# Patient Record
Sex: FEMALE | Race: WHITE | NOT HISPANIC OR LATINO | Employment: FULL TIME | URBAN - METROPOLITAN AREA
[De-identification: names, ages, dates, MRNs, and addresses within clinical notes are randomized per-mention and may not be internally consistent; named-entity substitution may affect disease eponyms.]

---

## 2017-01-10 DIAGNOSIS — M75.22 BICIPITAL TENDINITIS OF LEFT SHOULDER: ICD-10-CM

## 2017-01-23 ENCOUNTER — GENERIC CONVERSION - ENCOUNTER (OUTPATIENT)
Dept: OTHER | Facility: OTHER | Age: 22
End: 2017-01-23

## 2017-01-23 ENCOUNTER — APPOINTMENT (OUTPATIENT)
Dept: PHYSICAL THERAPY | Facility: CLINIC | Age: 22
End: 2017-01-23
Payer: COMMERCIAL

## 2017-01-23 DIAGNOSIS — M75.22 BICIPITAL TENDINITIS OF LEFT SHOULDER: ICD-10-CM

## 2017-01-23 PROCEDURE — 97110 THERAPEUTIC EXERCISES: CPT

## 2017-01-23 PROCEDURE — 97112 NEUROMUSCULAR REEDUCATION: CPT

## 2017-01-27 ENCOUNTER — APPOINTMENT (OUTPATIENT)
Dept: PHYSICAL THERAPY | Facility: CLINIC | Age: 22
End: 2017-01-27
Payer: COMMERCIAL

## 2017-01-27 PROCEDURE — 97110 THERAPEUTIC EXERCISES: CPT

## 2017-01-30 ENCOUNTER — APPOINTMENT (OUTPATIENT)
Dept: PHYSICAL THERAPY | Facility: CLINIC | Age: 22
End: 2017-01-30
Payer: COMMERCIAL

## 2017-01-30 PROCEDURE — 97112 NEUROMUSCULAR REEDUCATION: CPT

## 2017-01-30 PROCEDURE — 97110 THERAPEUTIC EXERCISES: CPT

## 2017-02-02 ENCOUNTER — APPOINTMENT (OUTPATIENT)
Dept: PHYSICAL THERAPY | Facility: CLINIC | Age: 22
End: 2017-02-02
Payer: COMMERCIAL

## 2017-02-02 PROCEDURE — 97110 THERAPEUTIC EXERCISES: CPT

## 2017-02-02 PROCEDURE — 97112 NEUROMUSCULAR REEDUCATION: CPT

## 2017-02-06 ENCOUNTER — APPOINTMENT (OUTPATIENT)
Dept: PHYSICAL THERAPY | Facility: CLINIC | Age: 22
End: 2017-02-06
Payer: COMMERCIAL

## 2017-02-06 PROCEDURE — 97110 THERAPEUTIC EXERCISES: CPT

## 2017-02-09 ENCOUNTER — APPOINTMENT (OUTPATIENT)
Dept: PHYSICAL THERAPY | Facility: CLINIC | Age: 22
End: 2017-02-09
Payer: COMMERCIAL

## 2017-02-13 ENCOUNTER — APPOINTMENT (OUTPATIENT)
Dept: PHYSICAL THERAPY | Facility: CLINIC | Age: 22
End: 2017-02-13
Payer: COMMERCIAL

## 2017-02-13 PROCEDURE — 97112 NEUROMUSCULAR REEDUCATION: CPT

## 2017-02-13 PROCEDURE — 97110 THERAPEUTIC EXERCISES: CPT

## 2017-02-20 ENCOUNTER — APPOINTMENT (OUTPATIENT)
Dept: PHYSICAL THERAPY | Facility: CLINIC | Age: 22
End: 2017-02-20
Payer: COMMERCIAL

## 2017-02-20 PROCEDURE — 97112 NEUROMUSCULAR REEDUCATION: CPT

## 2017-02-20 PROCEDURE — 97110 THERAPEUTIC EXERCISES: CPT

## 2017-02-27 ENCOUNTER — APPOINTMENT (OUTPATIENT)
Dept: PHYSICAL THERAPY | Facility: CLINIC | Age: 22
End: 2017-02-27
Payer: COMMERCIAL

## 2018-01-11 NOTE — PROGRESS NOTES
Chief Complaint  pt here for ppd read  tc/cma      Active Problems    1  Acute maxillary sinusitis (461 0) (J01 00)   2  Annual physical exam (V70 0) (Z00 00)   3  Encounter for Papanicolaou cervical smear to confirm findings of recent normal smear   following initial abnormal smear (V72 32) (Z01 42)   4  Generalized anxiety disorder (300 02) (F41 1)   5  Injury of left shoulder, initial encounter (959 2) (S49 92XA)   6  Need for prophylactic vaccination and inoculation against influenza (V04 81) (Z23)   7  Pap smear, low-risk (V76 2) (Z01 419)   8  PPD screening test (V74 1) (Z11 1)   9  Right shoulder pain (719 41) (M25 511)   10  Screening for deficiency anemia (V78 1) (Z13 0)   11  Screening for diabetes mellitus (V77 1) (Z13 1)   12  Screening for lipoid disorders (V77 91) (Z13 220)   13  Screening for thyroid disorder (V77 0) (Z13 29)   14  Sprain of left shoulder, unspecified shoulder sprain type, initial encounter (840 9)    (S43 402A)    Current Meds   1  ALPRAZolam 0 25 MG Oral Tablet; TAKE ONE TABLET BY MOUTH EVERY 8 HOURS AS   NEEDED FOR ANXIETY; Therapy: 94ABH8105 to (Last Rx:30Mar2015) Ordered   2  Latuda 40 MG Oral Tablet; Therapy: 13ULA6513 to Recorded   3  Ortho Tri-Cyclen (28) 0 18/0 215/0 25 MG-35 MCG Oral Tablet; Therapy: 42QQD3436 to Recorded    Allergies    1   No Known Drug Allergies    Plan  PPD screening test    · PPD    Signatures   Electronically signed by : Mendel Devonshire, DO; Jul 25 2016  3:34AM EST                       (Author)

## 2018-01-12 ENCOUNTER — ALLSCRIPTS OFFICE VISIT (OUTPATIENT)
Dept: OTHER | Facility: OTHER | Age: 23
End: 2018-01-12

## 2018-01-12 LAB — S PYO AG THROAT QL: NEGATIVE

## 2018-01-16 NOTE — PROGRESS NOTES
Assessment    1  Screening for deficiency anemia (V78 1) (Z13 0)   2  Screening for thyroid disorder (V77 0) (Z13 29)   3  Screening for lipoid disorders (V77 91) (Z13 220)   4  Screening for diabetes mellitus (V77 1) (Z13 1)   5  Annual physical exam (V70 0) (Z00 00)   6  Pap smear, low-risk (V76 2) (Z01 419)   7  Never a smoker    Plan  Annual physical exam    · (1) COMPREHENSIVE METABOLIC PANEL; Status:Active; Requested for:12Yrj0201;   Pap smear, low-risk    · 3 - Vera Subramanian MD, [facility] Springer  (Obstetrics/Gynecology) Physician Referral  Consult  Status:  Active  Requested for: [id number]  are Referring to a non- Preferred Provider : Established Patient  Care Summary provided  : Yes  Screening for deficiency anemia    · (1) CBC/PLT/DIFF; Status:Active; Requested for:17Rnb0008;   Screening for diabetes mellitus    · (1) HEMOGLOBIN A1C; Status:Active; Requested for:22Nai5575;   Screening for lipoid disorders    · (1) LIPID PANEL, FASTING; Status:Active; Requested for:33Eiq4383;   Screening for thyroid disorder    · (1) TSH; Status:Active; Requested for:42Bvy9323;     Discussion/Summary  health maintenance visit healthy adult female Patient discussion: discussed with the patient, 30 minute visit, greater than half of the time was spent on counseling  1  Annual Physical: The patient is medically cleared to enroll in school pending the results of a PPD test   2  Return to office in 48 hours for a PPD read  The patient was counseled regarding instructions for management, prognosis, patient and family education, impressions  total time of encounter was 30 minutes and 15 minutes was spent counseling  Chief Complaint  Patient presents to the office for a Physical /cg      History of Present Illness  HM, Adult Female: The patient is being seen for a health maintenance evaluation  General Health: The patient's health since the last visit is described as good     Screening:   HPI: The patient is a 19year old female who presents to the office for a physical prior to starting school  The patient denies any medical complaints  Review of Systems    Constitutional: no fever, not feeling poorly, no chills and not feeling tired  Eyes: no eyesight problems, no dryness of the eyes and eyes not red  ENT: no nosebleeds, no sore throat and no nasal discharge  Cardiovascular: no chest pain and no palpitations  Respiratory: no shortness of breath, no cough and no shortness of breath during exertion  Gastrointestinal: no abdominal pain, no nausea, no vomiting, no constipation, no diarrhea and no blood in stools  Genitourinary: no incontinence  Musculoskeletal: no arthralgias and no myalgias  Integumentary: no rashes  Neurological: no headache, no numbness, no tingling, no dizziness, no limb weakness and no convulsions  Psychiatric: no anxiety  Endocrine: no muscle weakness  Hematologic/Lymphatic: no swollen glands, no tendency for easy bleeding, no tendency for easy bruising and no swollen glands in the neck  ROS reviewed  Active Problems    1  Acute maxillary sinusitis (461 0) (J01 00)   2  Annual physical exam (V70 0) (Z00 00)   3  Generalized anxiety disorder (300 02) (F41 1)   4  Injury of left shoulder, initial encounter (959 2) (S49 92XA)   5  Need for prophylactic vaccination and inoculation against influenza (V04 81) (Z23)   6  Pap smear, low-risk (V76 2) (Z01 419)   7  Right shoulder pain (719 41) (M25 511)   8  Screening for deficiency anemia (V78 1) (Z13 0)   9  Screening for diabetes mellitus (V77 1) (Z13 1)   10  Screening for lipoid disorders (V77 91) (Z13 220)   11   Screening for thyroid disorder (V77 0) (Z13 29)   12  Sprain of left shoulder, unspecified shoulder sprain type, initial encounter (840 9)    (Z26 106E)    Past Medical History    · History of Ankle Sprain (845 00)   · History of acute sinusitis (V12 69) (Z87 09)   · History of acute sinusitis (V12 69) (Z87 09)   · History of viral gastroenteritis (V12 09) (Z86 19)   · History of vomiting (V13 89) (F91 090)   · History of Influenza vaccination administered during current admission (V04 81) (Z23)   · History of Nonallopathic lesion of thoracic region (739 2) (M99 9)   · History of Shakiness (781 0) (R25 1)   · History of Somatic Dysfunction Of Pelvic Region (739 5)   · History of Sore throat (462) (J02 9)    Family History  Family History    · No pertinent family history    Social History    · Never a smoker   · No drug use   · Occasional alcohol use   · Single    Current Meds   1  ALPRAZolam 0 25 MG Oral Tablet; TAKE ONE TABLET BY MOUTH EVERY 8 HOURS AS   NEEDED FOR ANXIETY; Therapy: 47QUR6156 to (Last Rx:30Mar2015) Ordered   2  Latuda 40 MG Oral Tablet; Therapy: 46FJD6361 to Recorded   3  Ortho Tri-Cyclen (28) 0 18/0 215/0 25 MG-35 MCG Oral Tablet; Therapy: 40QYI0911 to Recorded    Allergies    1  No Known Drug Allergies    Vitals   Recorded: 93ZOM4756 70:29ZE   Systolic 98, LUE, Sitting   Diastolic 60, LUE, Sitting   Heart Rate 72, L Radial   Pulse Quality Normal, L Radial   Respiration Quality Normal   Respiration 16   Temperature 97 6 F, Temporal   Height 5 ft 1 in   Weight 107 lb    BMI Calculated 20 22   BSA Calculated 1 45     Physical Exam    Constitutional   General appearance: No acute distress, well appearing and well nourished  Head and Face   Head and face: Normal     Eyes   Conjunctiva and lids: No swelling, erythema or discharge  Pupils and irises: Equal, round, reactive to light  Ophthalmoscopic examination: Normal fundi and optic discs  Ears, Nose, Mouth, and Throat   External inspection of ears and nose: Normal     Otoscopic examination: Tympanic membranes translucent with normal light reflex  Canals patent without erythema  Hearing: Normal     Nasal mucosa, septum, and turbinates: Normal without edema or erythema  Lips, teeth, and gums: Normal, good dentition      Oropharynx: Normal with no erythema, edema, exudate or lesions  Neck   Neck: Supple, symmetric, trachea midline, no masses  Thyroid: Normal, no thyromegaly  Pulmonary   Respiratory effort: No increased work of breathing or signs of respiratory distress  Auscultation of lungs: Clear to auscultation  Cardiovascular   Auscultation of heart: Normal rate and rhythm, normal S1 and S2, no murmurs  Carotid pulses: 2+ bilaterally  Peripheral vascular exam: Normal     Examination of extremities for edema and/or varicosities: Normal     Abdomen   Abdomen: Non-tender, no masses  Liver and spleen: No hepatomegaly or splenomegaly  Lymphatic   Palpation of lymph nodes in neck: No lymphadenopathy  Musculoskeletal   Gait and station: Normal     Digits and nails: Normal without clubbing or cyanosis  Joints, bones, and muscles: Normal     Range of motion: Normal     Stability: Normal     Muscle strength/tone: Normal     Skin   Skin and subcutaneous tissue: Normal without rashes or lesions  Neurologic   Cranial nerves: Cranial nerves II-XII intact  Cortical function: Normal mental status  Reflexes: 2+ and symmetric  Sensation: No sensory loss  Coordination: Normal finger to nose and heel to shin  Psychiatric   Judgment and insight: Normal     Orientation to person, place, and time: Normal     Recent and remote memory: Intact  Mood and affect: Normal        Results/Data  Health Maintenance Flow Sheet [id number] 01:30PM      Test Name Result Flag Reference   Pap      7/2015 Dr Mj Ramirez, [place]       Procedure    Procedure: Visual Acuity Test    Indication: routine screening  Inforrmation supplied by  a Snellen chart  Results: 20/30 in both eyes with corrective device, 20/30 in the right eye with corrective device, 20/50 in the left eye with corrective device normal in both eyes   Wearing Contact Lenses   Color vision was reported by  and the results were normal    The patient was cooperative, but tolerated the procedure well        Signatures   Electronically signed by : Yuliya Walker DO; Jul 24 2016 11:33PM EST                       (Author)

## 2018-01-16 NOTE — RESULT NOTES
Verified Results  * XR SHOULDER 2+ VIEW LEFT [id number] 11:44AM Olga Lidia Marisa Order Number: [id number]     Test Name Result Flag Reference   XR SHOULDER 2+ VW LEFT (Report)     LEFT SHOULDER     INDICATION: Twisting injury doing back handspring  COMPARISON: Contralateral side     VIEWS: 3; 3 images     FINDINGS:     There is no acute fracture or dislocation  No degenerative changes  No lytic or blastic lesions are seen  Soft tissues are unremarkable  IMPRESSION:     No acute osseous abnormality  Workstation performed: [id number]     Signed by:   Leonardo Nieves MD   5/20/16     * XR SHOULDER 2+ VIEW RIGHT [id number] 11:44AM Olga Lidia Marisa Order Number: [id number]     Test Name Result Flag Reference   XR SHOULDER 2+ VW RIGHT (Report)     RIGHT SHOULDER     INDICATION: Shoulder pain  Injury  COMPARISON: None     VIEWS: 3; 3 images     FINDINGS:     There is no acute fracture or dislocation  No degenerative changes  No lytic or blastic lesions are seen  Soft tissues are unremarkable  IMPRESSION:     No acute osseous abnormality         Workstation performed: [id number]     Signed by:   Leonardo Nieves MD   5/20/16       Signatures   Electronically signed by : NAZIA De Guzman; May 23 2016  9:32AM EST                       (Author)

## 2018-01-22 VITALS
TEMPERATURE: 98.2 F | WEIGHT: 109 LBS | SYSTOLIC BLOOD PRESSURE: 110 MMHG | HEIGHT: 61 IN | RESPIRATION RATE: 12 BRPM | DIASTOLIC BLOOD PRESSURE: 66 MMHG | BODY MASS INDEX: 20.58 KG/M2 | HEART RATE: 68 BPM

## 2018-02-26 NOTE — MISCELLANEOUS
Message  Return to work or school:   Mika Hall is under my professional care  She was seen in my office on 1/12/18   She is able to return to work on  1/18/18      Excused 1/12/18 for illness  Denisse MANDUJANO        Signatures   Electronically signed by : NAZIA Campbell; Jan 12 2018  9:19AM EST                       (Author)    Electronically signed by : MONTEZ Kelly ; Jan 12 2018 11:11AM EST                       (Author)

## 2018-03-26 ENCOUNTER — OFFICE VISIT (OUTPATIENT)
Dept: FAMILY MEDICINE CLINIC | Facility: CLINIC | Age: 23
End: 2018-03-26
Payer: COMMERCIAL

## 2018-03-26 VITALS
BODY MASS INDEX: 20.22 KG/M2 | HEIGHT: 60 IN | DIASTOLIC BLOOD PRESSURE: 64 MMHG | TEMPERATURE: 97.9 F | SYSTOLIC BLOOD PRESSURE: 102 MMHG | WEIGHT: 103 LBS | HEART RATE: 68 BPM

## 2018-03-26 DIAGNOSIS — H10.31 ACUTE BACTERIAL CONJUNCTIVITIS OF RIGHT EYE: Primary | ICD-10-CM

## 2018-03-26 PROCEDURE — 99213 OFFICE O/P EST LOW 20 MIN: CPT | Performed by: NURSE PRACTITIONER

## 2018-03-26 PROCEDURE — 3008F BODY MASS INDEX DOCD: CPT | Performed by: NURSE PRACTITIONER

## 2018-03-26 RX ORDER — NORGESTIMATE AND ETHINYL ESTRADIOL 7DAYSX3 28
KIT ORAL
COMMUNITY
Start: 2015-09-23 | End: 2021-03-15 | Stop reason: ALTCHOICE

## 2018-03-26 RX ORDER — GENTAMICIN SULFATE 3 MG/ML
1 SOLUTION/ DROPS OPHTHALMIC 3 TIMES DAILY
Qty: 5 ML | Refills: 0 | Status: SHIPPED | OUTPATIENT
Start: 2018-03-26 | End: 2018-04-02

## 2018-03-26 RX ORDER — FLUCONAZOLE 150 MG/1
1 TABLET ORAL
COMMUNITY
Start: 2018-01-12 | End: 2021-03-15 | Stop reason: ALTCHOICE

## 2018-03-26 RX ORDER — ALPRAZOLAM 0.25 MG/1
TABLET ORAL
COMMUNITY
Start: 2014-05-29 | End: 2020-08-18 | Stop reason: SDUPTHER

## 2018-03-26 RX ORDER — EPINEPHRINE 0.3 MG/.3ML
INJECTION SUBCUTANEOUS
COMMUNITY
Start: 2018-01-12

## 2018-03-26 NOTE — LETTER
March 26, 2018     Patient: Shneg Black   YOB: 1995   Date of Visit: 3/26/2018       To Whom it May Concern:    Joan Lee is under my professional care  She was seen in my office on 3/26/2018  She may return to work on 3/26/18  Excused for illness 3/24/18  If you have any questions or concerns, please don't hesitate to call           Sincerely,          ELIZABETH Boss        CC: No Recipients

## 2018-03-26 NOTE — PATIENT INSTRUCTIONS
Conjunctivitis   AMBULATORY CARE:   Conjunctivitis,  or pink eye, is inflammation of your conjunctiva  The conjunctiva is a thin tissue that covers the front of your eye and the back of your eyelids  The conjunctiva helps protect your eye and keep it moist  Conjunctivitis may be caused by bacteria, allergies, or a virus  If your conjunctivitis is caused by bacteria, it may get better on its own in about 7 days  Viral conjunctivitis can last up to 3 weeks  Common symptoms may include any of the following: You will usually have symptoms in both eyes if your conjunctivitis is caused by allergies  You may also have other allergic symptoms, such as a rash or runny nose  Symptoms will usually start in 1 eye if your conjunctivitis is caused by a virus or bacteria  · Redness in the whites of your eye    · Itching in your eye or around your eye    · Feeling like there is something in your eye    · Watery or thick, sticky discharge    · Crusty eyelids when you wake up in the morning    · Burning, stinging, or swelling in your eye    · Pain when you see bright light  Seek care immediately if:   · You have worsening eye pain  · The swelling in your eye gets worse, even after treatment  · Your vision suddenly becomes worse or you cannot see at all  Contact your healthcare provider if:   · You develop a fever and ear pain  · You have tiny bumps or spots of blood on your eye  · You have questions or concerns about your condition or care  Treatment  will depend on the cause of your conjunctivitis  You may need antibiotics or allergy medicine as a pill, eye drop, or eye ointment  Manage your symptoms:   · Apply a cool compress  Wet a washcloth with cold water and place it on your eye  This will help decrease itching and irritation  · Do not wear contact lenses  They can irritate your eye  Throw away the pair you are using and ask when you can wear them again   Use a new pair of lenses when your healthcare provider says it is okay  · Avoid irritants  Stay away from smoke filled areas  Shield your eyes from wind and sun  · Flush your eye  You may need to flush your eye with saline to help decrease your symptoms  Ask for more information on how to flush your eye  Medicines:  Treatment depends on what is causing your conjunctivitis  You may be given any of the following:  · Allergy medicine  helps decrease itchy, red, swollen eyes caused by allergies  It may be given as a pill, eye drops, or nasal spray  · Antibiotics  may be needed if your conjunctivitis is caused by bacteria  This medicine may be given as a pill, eye drops, or eye ointment  · Take your medicine as directed  Contact your healthcare provider if you think your medicine is not helping or if you have side effects  Tell him or her if you are allergic to any medicine  Keep a list of the medicines, vitamins, and herbs you take  Include the amounts, and when and why you take them  Bring the list or the pill bottles to follow-up visits  Carry your medicine list with you in case of an emergency  Prevent the spread of conjunctivitis:   · Wash your hands with soap and water often  Wash your hands before and after you touch your eyes  Also wash your hands before you prepare or eat food and after you use the bathroom or change a diaper  · Avoid allergens  Try to avoid the things that cause your allergies, such as pets, dust, or grass  · Avoid contact with others  Do not share towels or washcloths  Try to stay away from others as much as possible  Ask when you can return to work or school  · Throw away eye makeup  The bacteria that caused your conjunctivitis can stay in eye makeup  Throw away mascara and other eye makeup  © 2017 [facility] Information is for End User's use only and may not be sold, redistributed or otherwise used for commercial purposes   All illustrations and images included in [employer] are the copyrighted property of A D A M , Inc  or [employer] Seaman  The above information is an  only  It is not intended as medical advice for individual conditions or treatments  Talk to your doctor, nurse or pharmacist before following any medical regimen to see if it is safe and effective for you

## 2018-03-26 NOTE — PROGRESS NOTES
Assessment:      Acute conjunctivitis      Plan:      Discussed the diagnosis and proper care of conjunctivitis  Stressed household hygiene  School/ note written  Ophthalmic drops per orders  Warm compress to eye(s)  Local eye care discussed  FU here in 7 days or PRN  Subjective:      Enedina Almanzar is a 23 y o  female who presents for evaluation of redness and itchiness  She has noticed the above symptoms in the right eye for 3 days  Onset was acute  Symptoms have included discharge and itching  Patient denies blurred vision, foreign body sensation, pain, photophobia, tearing and visual field deficit  Denies URI sxs  There is a history of exposure to pink eye at work Works as a hospital Rn  The following portions of the patient's history were reviewed and updated as appropriate: allergies, current medications, past family history, past medical history, past social history, past surgical history and problem list     Review of Systems  Pertinent items are noted in HPI         Objective:      /64 (BP Location: Left arm, Patient Position: Sitting, Cuff Size: Adult)   Pulse 68   Temp 97 9 °F (36 6 °C)   Ht 5' (1 524 m)   Wt 46 7 kg (103 lb)   BMI 20 12 kg/m²             General: alert and oriented, in no acute distress   Eyes:  right conjuntiv injected, scant amt of purulent discharge noted

## 2020-07-14 DIAGNOSIS — F90.0 ATTENTION DEFICIT HYPERACTIVITY DISORDER, INATTENTIVE TYPE: Primary | ICD-10-CM

## 2020-07-14 RX ORDER — ESCITALOPRAM OXALATE 10 MG/1
15 TABLET ORAL DAILY
COMMUNITY
Start: 2020-06-22 | End: 2020-10-11 | Stop reason: SINTOL

## 2020-07-14 RX ORDER — DEXTROAMPHETAMINE SACCHARATE, AMPHETAMINE ASPARTATE MONOHYDRATE, DEXTROAMPHETAMINE SULFATE AND AMPHETAMINE SULFATE 2.5; 2.5; 2.5; 2.5 MG/1; MG/1; MG/1; MG/1
10 CAPSULE, EXTENDED RELEASE ORAL EVERY MORNING
Qty: 30 CAPSULE | Refills: 0 | Status: SHIPPED | OUTPATIENT
Start: 2020-07-14 | End: 2020-08-25 | Stop reason: SDUPTHER

## 2020-07-14 RX ORDER — DEXTROAMPHETAMINE SACCHARATE, AMPHETAMINE ASPARTATE MONOHYDRATE, DEXTROAMPHETAMINE SULFATE AND AMPHETAMINE SULFATE 2.5; 2.5; 2.5; 2.5 MG/1; MG/1; MG/1; MG/1
10 CAPSULE, EXTENDED RELEASE ORAL EVERY MORNING
COMMUNITY
Start: 2020-06-02 | End: 2020-07-14 | Stop reason: SDUPTHER

## 2020-07-24 ENCOUNTER — TELEMEDICINE (OUTPATIENT)
Dept: BEHAVIORAL/MENTAL HEALTH CLINIC | Facility: CLINIC | Age: 25
End: 2020-07-24
Payer: COMMERCIAL

## 2020-07-24 DIAGNOSIS — F41.1 GENERALIZED ANXIETY DISORDER: Chronic | ICD-10-CM

## 2020-07-24 DIAGNOSIS — F43.10 POSTTRAUMATIC STRESS DISORDER: Primary | Chronic | ICD-10-CM

## 2020-07-24 PROCEDURE — 90832 PSYTX W PT 30 MINUTES: CPT | Performed by: SOCIAL WORKER

## 2020-07-24 NOTE — PSYCH
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

## 2020-07-27 ENCOUNTER — TELEMEDICINE (OUTPATIENT)
Dept: PSYCHIATRY | Facility: CLINIC | Age: 25
End: 2020-07-27
Payer: COMMERCIAL

## 2020-07-27 DIAGNOSIS — F90.2 ATTENTION DEFICIT HYPERACTIVITY DISORDER, COMBINED TYPE: Primary | ICD-10-CM

## 2020-07-27 PROCEDURE — 99215 OFFICE O/P EST HI 40 MIN: CPT | Performed by: NURSE PRACTITIONER
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
## 2020-07-27 NOTE — BH TREATMENT PLAN
TREATMENT PLAN         [address] [facility]    Name and Date of Birth:  Bernardino Merida 30 y o  1995    Date of Treatment Plan: July 27, 2020    Diagnosis/Diagnoses:    1  Attention deficit hyperactivity disorder, combined type        Strengths/Personal Resources for Self-Care: taking medications as prescribed, ability to communicate well, general fund of knowledge, good physical health, good understanding of illness, motivation for treatment  Area/Areas of need (in own words): anxiety symptoms, depressive symptoms  1  Long Term Goal: improve control of acceptable anxiety level  Target Date: 2 months - 9/27/2020  Person/Persons responsible for completion of goal: Guevara    2  Short Term Objective (s) - How will we reach this goal?:   A  Provider new recommended medication/dosage changes and/or continue medication(s): continue current medications as prescribed Latuda, Xanax, Adderall  B  Continue to see Elie Olson LCSW for psychotherapy  C  take medications as prescribed, attend scheduled appts  Target Date: 3 months - 10/27/2020  Person/Persons Responsible for Completion of Goal: Guevara    Progress Towards Goals: continuing treatment    Treatment Modality: medication management every 4 weeks    Review due 90 to 120 days from date of this plan: 4 months - 11/27/2020  Expected length of service: ongoing treatment  My Physician/PA/NP and I have developed this plan together and I agree to work on the goals and objectives  I understand the treatment goals that were developed for my treatment

## 2020-08-04 ENCOUNTER — TELEMEDICINE (OUTPATIENT)
Dept: BEHAVIORAL/MENTAL HEALTH CLINIC | Facility: CLINIC | Age: 25
End: 2020-08-04
Payer: COMMERCIAL

## 2020-08-04 DIAGNOSIS — F41.1 GENERALIZED ANXIETY DISORDER: Primary | Chronic | ICD-10-CM

## 2020-08-04 PROCEDURE — 90834 PSYTX W PT 45 MINUTES: CPT | Performed by: SOCIAL WORKER
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
## 2020-08-13 ENCOUNTER — TELEMEDICINE (OUTPATIENT)
Dept: BEHAVIORAL/MENTAL HEALTH CLINIC | Facility: CLINIC | Age: 25
End: 2020-08-13
Payer: COMMERCIAL

## 2020-08-13 DIAGNOSIS — F43.10 POSTTRAUMATIC STRESS DISORDER: Chronic | ICD-10-CM

## 2020-08-13 DIAGNOSIS — F41.1 GENERALIZED ANXIETY DISORDER: Primary | Chronic | ICD-10-CM

## 2020-08-13 PROCEDURE — 90834 PSYTX W PT 45 MINUTES: CPT | Performed by: SOCIAL WORKER

## 2020-08-18 ENCOUNTER — OFFICE VISIT (OUTPATIENT)
Dept: PSYCHIATRY | Facility: CLINIC | Age: 25
End: 2020-08-18
Payer: COMMERCIAL

## 2020-08-18 DIAGNOSIS — F90.0 ATTENTION DEFICIT HYPERACTIVITY DISORDER, INATTENTIVE TYPE: ICD-10-CM

## 2020-08-18 DIAGNOSIS — F31.81 BIPOLAR II DISORDER (HCC): Primary | ICD-10-CM

## 2020-08-18 DIAGNOSIS — F41.1 GENERALIZED ANXIETY DISORDER: ICD-10-CM

## 2020-08-18 DIAGNOSIS — F41.1 GENERALIZED ANXIETY DISORDER: Primary | ICD-10-CM

## 2020-08-18 PROCEDURE — 99214 OFFICE O/P EST MOD 30 MIN: CPT | Performed by: NURSE PRACTITIONER

## 2020-08-18 RX ORDER — DEXTROAMPHETAMINE SACCHARATE, AMPHETAMINE ASPARTATE MONOHYDRATE, DEXTROAMPHETAMINE SULFATE AND AMPHETAMINE SULFATE 2.5; 2.5; 2.5; 2.5 MG/1; MG/1; MG/1; MG/1
10 CAPSULE, EXTENDED RELEASE ORAL EVERY MORNING
Qty: 30 CAPSULE | Refills: 0 | Status: CANCELLED | OUTPATIENT
Start: 2020-08-18

## 2020-08-18 NOTE — PSYCH
Psychiatric Progress Note   08/18/20     Doretha Sees 30 y o  female MRN: [id number]   @ Encounter: [id number]    Visit Diagnosis:   Encounter Diagnosis     ICD-10-CM    1  Bipolar II disorder ([facility] [address] )  F31 81    2  Attention deficit hyperactivity disorder, inattentive type  F90 0    3  Generalized anxiety disorder  F41 1         Virtual Visit joe Waterman was seen face to face today for 25 minutes for medication management  All [place] State COVID-19 precautions were observed; social distancing of at leas t 6 feet, mask-wearing were observed at all times; furniture pt sat in was cleaned before pt entered my office and after pt left it, and hand washing was done as required  Psychiatric status since the last visit: minimal improvement  Current Outpatient Medications:     ALPRAZolam (XANAX) 0 25 mg tablet, Take by mouth, Disp: , Rfl:     amphetamine-dextroamphetamine (ADDERALL XR) 10 MG 24 hr capsule, Take 1 capsule (10 mg total) by mouth every morningMax Daily Amount: 10 mg, Disp: 30 capsule, Rfl: 0    EPINEPHrine (EPIPEN) 0 3 mg/0 3 mL SOAJ, Inject as directed, Disp: , Rfl:     escitalopram (LEXAPRO) 10 mg tablet, Take 15 mg by mouth daily, Disp: , Rfl:     fluconazole (DIFLUCAN) 150 mg tablet, Take 1 tablet by mouth, Disp: , Rfl:     lurasidone (LATUDA) 40 mg tablet, Take by mouth, Disp: , Rfl:     norgestimate-ethinyl estradiol (ORTHO TRI-CYCLEN, 28,) 0 18/0 215/0 25 MG-35 MCG per tablet, Take by mouth, Disp: , Rfl:    Sleep: irregular  Appetite: normal  Medication side effects:   Yes - Latuda - tremors, occasionally her finger froze; too expensive  ROS: N/A  Vitals Taken yes  There were no vitals filed for this visit  Progress Toward Goals: minimal progress    Assessment: not taking Latuda because it cost too much, she had tremors, and occasionally one of her fingers would freeze   Feels almost ready to go back to work today although if that nasty doc there said something to her she would lose it  Her roommate who has known her for a long time says this is the best she has been in three months  Pt is trying to get into therapy at [facility]  Suicide/Homicide Risk Assessment: See mental Status Exam Below    Mental Status Evaluation:  Appearance:  age appropriate, dressed appropriately   Behavior:  pleasant, cooperative   Speech:  hypertalkative, rapid, normal volume   Mood:  more anxious   Affect:  normal range and intensity   Thought Process:  circumstantial, increased rate of thoughts   Associations: circumstantial associations   Thought Content:  ruminating thoughts   Perceptual Disturbances: none   Risk Potential: Suicidal ideation - None  Homicidal ideation - None  Potential for aggression - No   Sensorium:  oriented to person, place and time/date   Memory:  recent and remote memory grossly intact   Consciousness:  alert and awake   Attention/Concentration: decreased attention span   Insight:  fair   Judgment: fair       Recommended Treatment:     Planned medication and treatment changes: All current active medications have been reviewed  Discontinue Latuda  Start Vraylar 1 5 mg  Continue current medications: Adderall ER 10 mg daily, alprazolam 0 25 mg daily as needed for anxiety      Risks / Benefits of Treatment:    Risks, benefits, and possible side effects of medications explained to patient and patient verbalizes understanding and agreement for treatment  Risks of medications in pregnancy explained if female patient  Patient verbalizes understanding and agrees to notify her doctor if she becomes pregnant  [facility] website data reviewed  Counseling / Coordination of Care:    Medications, treatment progress and treatment plan reviewed with patient      RONALDO Hinds 08/18/20

## 2020-08-20 ENCOUNTER — TELEMEDICINE (OUTPATIENT)
Dept: BEHAVIORAL/MENTAL HEALTH CLINIC | Facility: CLINIC | Age: 25
End: 2020-08-20
Payer: COMMERCIAL

## 2020-08-20 DIAGNOSIS — F31.12 BIPOLAR 1 DISORDER WITH MODERATE MANIA (HCC): Primary | ICD-10-CM

## 2020-08-20 PROCEDURE — 90834 PSYTX W PT 45 MINUTES: CPT | Performed by: SOCIAL WORKER

## 2020-08-20 NOTE — PSYCH
Time  11am-11:50am, 50 minutes   Session Type Teams Platform      Subjective     Client reports being very upset with recent dx from her prescriber that she has bipolar  D/O   It bothers her most because her Mom was dx with it and has been very difficult to deal with throughout her years  " I dont want to be like my Mom " She feels maybe shes know but has been hiding it for a while ? She states she will take medications for it but it upsets her as side effects for a young person is very hard to accept      Objective     No Suicide or Homicide Ideation, has cultural and spiritual support , affect is congruent and tearful  , content is on difficulty accepting a long term dx  , Mood is hypomanic today      Assessment     Client presents with clinical issue of sadness over a new dx  , Benefited from CBT informed modal     Plan     Client will report be open with b/f and getting his support on this as well as some trusted friends

## 2020-08-21 DIAGNOSIS — F31.11 BIPOLAR 1 DISORDER, MANIC, MILD (HCC): Primary | ICD-10-CM

## 2020-08-21 RX ORDER — ALPRAZOLAM 0.25 MG/1
0.25 TABLET ORAL DAILY
Qty: 30 TABLET | Refills: 0 | Status: SHIPPED | OUTPATIENT
Start: 2020-08-21 | End: 2020-10-08 | Stop reason: SDUPTHER

## 2020-08-21 RX ORDER — CARIPRAZINE 1.5 MG/1
1.5 CAPSULE, GELATIN COATED ORAL DAILY
Qty: 30 CAPSULE | Refills: 0 | Status: SHIPPED | OUTPATIENT
Start: 2020-08-21 | End: 2020-09-15 | Stop reason: DRUGHIGH

## 2020-08-24 ENCOUNTER — TELEPHONE (OUTPATIENT)
Dept: BEHAVIORAL/MENTAL HEALTH CLINIC | Facility: CLINIC | Age: 25
End: 2020-08-24

## 2020-08-25 DIAGNOSIS — F90.0 ATTENTION DEFICIT HYPERACTIVITY DISORDER, INATTENTIVE TYPE: ICD-10-CM

## 2020-08-25 RX ORDER — DEXTROAMPHETAMINE SACCHARATE, AMPHETAMINE ASPARTATE MONOHYDRATE, DEXTROAMPHETAMINE SULFATE AND AMPHETAMINE SULFATE 2.5; 2.5; 2.5; 2.5 MG/1; MG/1; MG/1; MG/1
10 CAPSULE, EXTENDED RELEASE ORAL EVERY MORNING
Qty: 30 CAPSULE | Refills: 0 | Status: SHIPPED | OUTPATIENT
Start: 2020-08-25 | End: 2020-08-26 | Stop reason: SDUPTHER

## 2020-08-26 DIAGNOSIS — F90.0 ATTENTION DEFICIT HYPERACTIVITY DISORDER, INATTENTIVE TYPE: ICD-10-CM

## 2020-08-26 RX ORDER — DEXTROAMPHETAMINE SACCHARATE, AMPHETAMINE ASPARTATE MONOHYDRATE, DEXTROAMPHETAMINE SULFATE AND AMPHETAMINE SULFATE 2.5; 2.5; 2.5; 2.5 MG/1; MG/1; MG/1; MG/1
10 CAPSULE, EXTENDED RELEASE ORAL EVERY MORNING
Qty: 30 CAPSULE | Refills: 0 | Status: SHIPPED | OUTPATIENT
Start: 2020-08-26 | End: 2020-09-28 | Stop reason: SDUPTHER

## 2020-09-03 ENCOUNTER — OFFICE VISIT (OUTPATIENT)
Dept: PSYCHIATRY | Facility: CLINIC | Age: 25
End: 2020-09-03
Payer: COMMERCIAL

## 2020-09-03 DIAGNOSIS — F90.0 ATTENTION DEFICIT HYPERACTIVITY DISORDER, INATTENTIVE TYPE: ICD-10-CM

## 2020-09-03 DIAGNOSIS — F31.11 BIPOLAR 1 DISORDER, MANIC, MILD (HCC): Primary | ICD-10-CM

## 2020-09-03 PROCEDURE — 99213 OFFICE O/P EST LOW 20 MIN: CPT | Performed by: NURSE PRACTITIONER

## 2020-09-03 NOTE — PSYCH
Psychiatric Progress Note   09/03/20   This note was not shared with the patient due to this is a psychotherapy note     Rola Flores 30 y o  female MRN: [id number]   @ Encounter: [id number]    Visit Diagnosis:   Encounter Diagnosis     ICD-10-CM    1  Bipolar 1 disorder, manic, mild (HCC)  F31 11    2  Attention deficit hyperactivity disorder, inattentive type  F90 0         Virtual Visit no    Patient was seen in the office for medication management and psychotherapy  COVID-19 precautions were observed at all times - masks worn, social distancing, hand washing before and appt, seat cleaned before pt entered the room and after pt left  Pt reports taking medication as prescribed  Psychiatric status since the last visit: slowly improving  Current Outpatient Medications:     ALPRAZolam (XANAX) 0 25 mg tablet, Take 1 tablet (0 25 mg total) by mouth daily, Disp: 30 tablet, Rfl: 0    amphetamine-dextroamphetamine (ADDERALL XR) 10 MG 24 hr capsule, Take 1 capsule (10 mg total) by mouth every morningMax Daily Amount: 10 mg, Disp: 30 capsule, Rfl: 0    EPINEPHrine (EPIPEN) 0 3 mg/0 3 mL SOAJ, Inject as directed, Disp: , Rfl:     escitalopram (LEXAPRO) 10 mg tablet, Take 15 mg by mouth daily, Disp: , Rfl:     fluconazole (DIFLUCAN) 150 mg tablet, Take 1 tablet by mouth, Disp: , Rfl:     lurasidone (LATUDA) 40 mg tablet, Take by mouth, Disp: , Rfl:     norgestimate-ethinyl estradiol (ORTHO TRI-CYCLEN, 28,) 0 18/0 215/0 25 MG-35 MCG per tablet, Take by mouth, Disp: , Rfl:     Vraylar 1 5 MG capsule, Take 1 capsule (1 5 mg total) by mouth daily, Disp: 30 capsule, Rfl: 0   Sleep: slept off and on  Appetite: fair  Medication side effects: No   ROS: N/A  Vitals Taken no  There were no vitals filed for this visit  Progress Toward Goals: some improvement    Assessment: feels somewhat jumpy  Has a lot of energy  Is making a couch in her apartment   Would like to be able to use FLMA if needed  Suicide/Homicide Risk Assessment: See mental Status Exam Below    Mental Status Evaluation:  Appearance:  age appropriate, dressed appropriately   Behavior:  normal   Speech:  normal rate, normal volume   Mood:  elevated   Affect:  mood-congruent   Thought Process:  increased rate of thoughts   Associations: intact associations   Thought Content:  normal   Perceptual Disturbances: none   Risk Potential: Suicidal ideation - None  Homicidal ideation - None  Potential for aggression - No   Sensorium:  oriented to person, place and time/date   Memory:  recent and remote memory grossly intact   Consciousness:  alert and awake   Attention/Concentration: attention span and concentration are age appropriate   Insight:  improved   Judgment: improved       Recommended Treatment:     Planned medication and treatment changes:  Continue Vraylar 3 mg one cap by mouth daily; Adderall XR 10 mg one cap daily in the morning; al;prazolam 0,25 mg one tab daily as needed for anxiety    Risks / Benefits of Treatment:    Risks, benefits, and possible side effects of medications explained to patient and patient verbalizes understanding and agreement for treatment  Counseling / Coordination of Care:    Patient's progress discussed with staff in treatment team meeting  Medications, treatment progress and treatment plan reviewed with patient      RONALDO Garnett 09/03/20

## 2020-09-06 VITALS — WEIGHT: 114.4 LBS | BODY MASS INDEX: 22.46 KG/M2 | HEIGHT: 60 IN

## 2020-09-06 NOTE — PATIENT INSTRUCTIONS
Discontinue Latuda  Start Vraylar 1 5 mg - pt was given a copay card  Continue current medications: Adderall ER 10 mg daily, alprazolam 0 25 mg daily as needed for anxiety  Next appt 2 weeks  Contact out-pt counseling at [facility] for appointment with psychotherapist there

## 2020-09-10 ENCOUNTER — TELEMEDICINE (OUTPATIENT)
Dept: BEHAVIORAL/MENTAL HEALTH CLINIC | Facility: CLINIC | Age: 25
End: 2020-09-10
Payer: COMMERCIAL

## 2020-09-10 DIAGNOSIS — F31.12 BIPOLAR 1 DISORDER WITH MODERATE MANIA (HCC): Primary | ICD-10-CM

## 2020-09-10 DIAGNOSIS — F43.10 POSTTRAUMATIC STRESS DISORDER: Chronic | ICD-10-CM

## 2020-09-10 PROCEDURE — 90834 PSYTX W PT 45 MINUTES: CPT | Performed by: SOCIAL WORKER

## 2020-09-10 NOTE — PSYCH
Time : 9am-9:45am, 45 minutes   Session Type : Micro Teams     Subjective     Client reports she feels improved management of bipolar symptoms at this point  She still feels she has a way to go to have better affect regulation but is hearing from family and friends shes improving  States a few provocations happened over past week and she in not exploding quickly like before and able to wait some before talking   She wants to try going back to work in two weeks   We filled out a return to work statement form     Objective     No Suicide or Homicide Ideation, has cultural and spiritual support , affect is congruent , content is on affect management  , Mood is less labile     Assessment     Client presents with clinical issue of improved mood management  , Benefited from CBT informed modal     Plan     Client will report using her new room mate situation to have grounding support from affect outbursts , use a workout regimen at gym and home on video to give bilateral stimulation for affect as well

## 2020-09-14 DIAGNOSIS — F31.11 BIPOLAR 1 DISORDER, MANIC, MILD (HCC): ICD-10-CM

## 2020-09-15 ENCOUNTER — TELEMEDICINE (OUTPATIENT)
Dept: BEHAVIORAL/MENTAL HEALTH CLINIC | Facility: CLINIC | Age: 25
End: 2020-09-15
Payer: COMMERCIAL

## 2020-09-15 DIAGNOSIS — F31.12 BIPOLAR 1 DISORDER WITH MODERATE MANIA (HCC): ICD-10-CM

## 2020-09-15 DIAGNOSIS — F43.10 POSTTRAUMATIC STRESS DISORDER: Primary | Chronic | ICD-10-CM

## 2020-09-15 DIAGNOSIS — F31.11 BIPOLAR 1 DISORDER, MANIC, MILD (HCC): Primary | ICD-10-CM

## 2020-09-15 PROCEDURE — 90834 PSYTX W PT 45 MINUTES: CPT | Performed by: SOCIAL WORKER

## 2020-09-15 RX ORDER — CARIPRAZINE 1.5 MG/1
1.5 CAPSULE, GELATIN COATED ORAL DAILY
Qty: 30 CAPSULE | Refills: 2 | OUTPATIENT
Start: 2020-09-15
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
## 2020-09-17 ENCOUNTER — TELEMEDICINE (OUTPATIENT)
Dept: PSYCHIATRY | Facility: CLINIC | Age: 25
End: 2020-09-17
Payer: COMMERCIAL

## 2020-09-17 DIAGNOSIS — F31.12: Primary | ICD-10-CM

## 2020-09-17 PROCEDURE — 99213 OFFICE O/P EST LOW 20 MIN: CPT | Performed by: NURSE PRACTITIONER

## 2020-09-28 DIAGNOSIS — F90.0 ATTENTION DEFICIT HYPERACTIVITY DISORDER, INATTENTIVE TYPE: ICD-10-CM

## 2020-09-29 ENCOUNTER — TELEPHONE (OUTPATIENT)
Dept: BEHAVIORAL/MENTAL HEALTH CLINIC | Facility: CLINIC | Age: 25
End: 2020-09-29
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
## 2020-09-29 NOTE — TELEPHONE ENCOUNTER
Writer reached out for Teams and no response  Writer then called her cell number, no response and voicemail box was full  Will await her to reschedule as she might have started back to work as she was expecting to ?

## 2020-09-30 ENCOUNTER — TELEMEDICINE (OUTPATIENT)
Dept: PSYCHIATRY | Facility: CLINIC | Age: 25
End: 2020-09-30
Payer: COMMERCIAL

## 2020-09-30 DIAGNOSIS — F31.12: Primary | ICD-10-CM

## 2020-09-30 DIAGNOSIS — F90.0 ATTENTION DEFICIT HYPERACTIVITY DISORDER, INATTENTIVE TYPE: ICD-10-CM

## 2020-09-30 PROCEDURE — 99214 OFFICE O/P EST MOD 30 MIN: CPT | Performed by: NURSE PRACTITIONER

## 2020-09-30 RX ORDER — DEXTROAMPHETAMINE SACCHARATE, AMPHETAMINE ASPARTATE MONOHYDRATE, DEXTROAMPHETAMINE SULFATE AND AMPHETAMINE SULFATE 2.5; 2.5; 2.5; 2.5 MG/1; MG/1; MG/1; MG/1
10 CAPSULE, EXTENDED RELEASE ORAL EVERY MORNING
Qty: 30 CAPSULE | Refills: 0 | Status: SHIPPED | OUTPATIENT
Start: 2020-09-30 | End: 2020-10-30 | Stop reason: SDUPTHER

## 2020-10-08 DIAGNOSIS — F41.1 GENERALIZED ANXIETY DISORDER: ICD-10-CM

## 2020-10-08 DIAGNOSIS — F31.11 BIPOLAR 1 DISORDER, MANIC, MILD (HCC): ICD-10-CM

## 2020-10-09 RX ORDER — ALPRAZOLAM 0.25 MG/1
0.25 TABLET ORAL DAILY
Qty: 30 TABLET | Refills: 0 | Status: SHIPPED | OUTPATIENT
Start: 2020-10-09 | End: 2020-11-30 | Stop reason: SDUPTHER

## 2020-10-30 DIAGNOSIS — F90.0 ATTENTION DEFICIT HYPERACTIVITY DISORDER, INATTENTIVE TYPE: ICD-10-CM

## 2020-10-30 RX ORDER — DEXTROAMPHETAMINE SACCHARATE, AMPHETAMINE ASPARTATE MONOHYDRATE, DEXTROAMPHETAMINE SULFATE AND AMPHETAMINE SULFATE 2.5; 2.5; 2.5; 2.5 MG/1; MG/1; MG/1; MG/1
10 CAPSULE, EXTENDED RELEASE ORAL EVERY MORNING
Qty: 30 CAPSULE | Refills: 0 | Status: SHIPPED | OUTPATIENT
Start: 2020-10-30 | End: 2020-12-04 | Stop reason: SDUPTHER

## 2020-11-10 ENCOUNTER — TELEPHONE (OUTPATIENT)
Dept: BEHAVIORAL/MENTAL HEALTH CLINIC | Facility: CLINIC | Age: 25
End: 2020-11-10

## 2020-11-18 ENCOUNTER — TELEMEDICINE (OUTPATIENT)
Dept: BEHAVIORAL/MENTAL HEALTH CLINIC | Facility: CLINIC | Age: 25
End: 2020-11-18
Payer: COMMERCIAL

## 2020-11-18 DIAGNOSIS — F41.1 GENERALIZED ANXIETY DISORDER: Chronic | ICD-10-CM

## 2020-11-18 DIAGNOSIS — F43.10 POSTTRAUMATIC STRESS DISORDER: Primary | Chronic | ICD-10-CM

## 2020-11-18 PROCEDURE — 90834 PSYTX W PT 45 MINUTES: CPT | Performed by: SOCIAL WORKER

## 2020-11-30 DIAGNOSIS — F41.1 GENERALIZED ANXIETY DISORDER: ICD-10-CM

## 2020-12-01 RX ORDER — ALPRAZOLAM 0.25 MG/1
0.25 TABLET ORAL DAILY
Qty: 30 TABLET | Refills: 0 | Status: SHIPPED | OUTPATIENT
Start: 2020-12-01 | End: 2020-12-28 | Stop reason: SDUPTHER

## 2020-12-04 ENCOUNTER — TELEMEDICINE (OUTPATIENT)
Dept: BEHAVIORAL/MENTAL HEALTH CLINIC | Facility: CLINIC | Age: 25
End: 2020-12-04
Payer: COMMERCIAL

## 2020-12-04 DIAGNOSIS — F90.0 ATTENTION DEFICIT HYPERACTIVITY DISORDER, INATTENTIVE TYPE: ICD-10-CM

## 2020-12-04 DIAGNOSIS — F41.1 GENERALIZED ANXIETY DISORDER: Primary | Chronic | ICD-10-CM

## 2020-12-04 PROCEDURE — 90834 PSYTX W PT 45 MINUTES: CPT | Performed by: SOCIAL WORKER

## 2020-12-04 RX ORDER — DEXTROAMPHETAMINE SACCHARATE, AMPHETAMINE ASPARTATE MONOHYDRATE, DEXTROAMPHETAMINE SULFATE AND AMPHETAMINE SULFATE 2.5; 2.5; 2.5; 2.5 MG/1; MG/1; MG/1; MG/1
10 CAPSULE, EXTENDED RELEASE ORAL EVERY MORNING
Qty: 30 CAPSULE | Refills: 0 | Status: SHIPPED | OUTPATIENT
Start: 2020-12-04 | End: 2020-12-28 | Stop reason: SDUPTHER

## 2020-12-10 NOTE — BH TREATMENT PLAN
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  12/10/2020   Date of Current Treatment Plan: 12/10/20    Treatment Plan Number 1      Strengths/Personal Resources for Self Care: Very intelligent, full time nurse and student in graduate program     Diagnosis:   1  Posttraumatic stress disorder     2  Generalized anxiety disorder         Area of Needs: PTSD work , support building   Long Term Goal 1: Manage her work and school demands effectively , reduce her anxiety attacks  Target Date:  6 months   Completion Date:  Pending          Short Term Objectives for Goal 1: Will participate in emdr informed treatment modal when able  Will report use of bilateral techniques to reduce anxiety levels  Will report increase in adaptive thinking vs  Problem focus thinking         GOAL 1: Modality: Individual 2x per month   Completion Date 6 months       [address]: Diagnosis and Treatment Plan explained to Alla d'Eliezeroirradha relates understanding diagnosis and is agreeable to Treatment Plan         Client Comments : Please share your thoughts, feelings, need and/or experiences regarding your treatment plan: Treatment Plan done but not signed at time of office visit due to:  Plan reviewed by phone or in person  and verbal consent given due to [place] social distancing

## 2020-12-21 ENCOUNTER — TELEMEDICINE (OUTPATIENT)
Dept: PSYCHIATRY | Facility: CLINIC | Age: 25
End: 2020-12-21
Payer: COMMERCIAL

## 2020-12-21 DIAGNOSIS — F31.12: ICD-10-CM

## 2020-12-21 DIAGNOSIS — F90.0 ATTENTION DEFICIT HYPERACTIVITY DISORDER, INATTENTIVE TYPE: Primary | ICD-10-CM

## 2020-12-21 PROCEDURE — 99214 OFFICE O/P EST MOD 30 MIN: CPT | Performed by: NURSE PRACTITIONER

## 2020-12-22 ENCOUNTER — TELEMEDICINE (OUTPATIENT)
Dept: BEHAVIORAL/MENTAL HEALTH CLINIC | Facility: CLINIC | Age: 25
End: 2020-12-22
Payer: COMMERCIAL

## 2020-12-22 DIAGNOSIS — F41.1 GENERALIZED ANXIETY DISORDER: Primary | Chronic | ICD-10-CM

## 2020-12-22 PROCEDURE — 90834 PSYTX W PT 45 MINUTES: CPT | Performed by: SOCIAL WORKER

## 2020-12-28 DIAGNOSIS — F90.0 ATTENTION DEFICIT HYPERACTIVITY DISORDER, INATTENTIVE TYPE: ICD-10-CM

## 2020-12-28 DIAGNOSIS — F41.1 GENERALIZED ANXIETY DISORDER: ICD-10-CM

## 2020-12-30 RX ORDER — ALPRAZOLAM 0.25 MG/1
0.25 TABLET ORAL DAILY
Qty: 30 TABLET | Refills: 0 | Status: SHIPPED | OUTPATIENT
Start: 2020-12-30 | End: 2021-03-02 | Stop reason: SDUPTHER

## 2020-12-30 RX ORDER — DEXTROAMPHETAMINE SACCHARATE, AMPHETAMINE ASPARTATE MONOHYDRATE, DEXTROAMPHETAMINE SULFATE AND AMPHETAMINE SULFATE 2.5; 2.5; 2.5; 2.5 MG/1; MG/1; MG/1; MG/1
10 CAPSULE, EXTENDED RELEASE ORAL EVERY MORNING
Qty: 30 CAPSULE | Refills: 0 | Status: SHIPPED | OUTPATIENT
Start: 2021-01-02 | End: 2021-01-28 | Stop reason: SDUPTHER

## 2021-01-05 ENCOUNTER — TELEMEDICINE (OUTPATIENT)
Dept: BEHAVIORAL/MENTAL HEALTH CLINIC | Facility: CLINIC | Age: 26
End: 2021-01-05
Payer: COMMERCIAL

## 2021-01-05 DIAGNOSIS — F43.10 POSTTRAUMATIC STRESS DISORDER: Primary | Chronic | ICD-10-CM

## 2021-01-05 DIAGNOSIS — F31.12 BIPOLAR 1 DISORDER WITH MODERATE MANIA (HCC): ICD-10-CM

## 2021-01-05 PROCEDURE — 90834 PSYTX W PT 45 MINUTES: CPT | Performed by: SOCIAL WORKER
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
## 2021-01-11 ENCOUNTER — TELEMEDICINE (OUTPATIENT)
Dept: PSYCHIATRY | Facility: CLINIC | Age: 26
End: 2021-01-11
Payer: COMMERCIAL

## 2021-01-11 DIAGNOSIS — F31.11 BIPOLAR 1 DISORDER, MANIC, MILD (HCC): ICD-10-CM

## 2021-01-11 DIAGNOSIS — F31.12: Primary | ICD-10-CM

## 2021-01-11 PROCEDURE — 99214 OFFICE O/P EST MOD 30 MIN: CPT | Performed by: NURSE PRACTITIONER

## 2021-01-11 NOTE — PSYCH
PROGRESS NOTE        [facility]      Name and Date of Birth:  Govind Zapata 30 y o  1995    Date of Visit: 01/11/21    Pt was seen for medication management for 30 minutes via Teams with pts consent  Door to office was closed, provider was alone in office  SUBJECTIVE: started talking alprazolam before her shift and it helps a little with her hand tremors and self-consciousness; usually doesn't take the second dose  Feels more calm and in control, moods generally stable  Starting Spring semester in [address] program next Tuesday  She denies suicidal ideation, intent or plan at present, has no suicidal ideation, intent or plan at present  She denies any auditory hallucinations and visual hallucinations, denies any other delusional thinking, denies any delusional thinking  She denies any side effects from medications    HPI ROS Appetite Changes and Sleep: normal appetite, normal sleep    Review Of Systems:      Constitutional Negative   ENT Negative   Cardiovascular Negative   Respiratory Negative   Gastrointestinal Negative   Genitourinary Negative   Musculoskeletal Negative   Integumentary Negative   Neurological Negative   Endocrine Negative   Other Symptoms Negative and None       Laboratory Results: No results found for this or any previous visit  Substance Abuse History:    Social History     Substance and Sexual Activity   Drug Use Not on file       Family Psychiatric History:     No family history on file      The following portions of the patient's history were reviewed and updated as appropriate: past family history, past medical history, past social history, past surgical history and problem list     Social History     Socioeconomic History    Marital status: Single     Spouse name: Not on file    Number of children: Not on file    Years of education: Not on file    Highest education level: Not on file   Occupational History  Not on file   Social Needs    Financial resource strain: Not on file    Food insecurity     Worry: Not on file     Inability: Not on file    Transportation needs     Medical: Not on file     Non-medical: Not on file   Tobacco Use    Smoking status: Not on file   Substance and Sexual Activity    Alcohol use: Not on file    Drug use: Not on file    Sexual activity: Not on file   Lifestyle    Physical activity     Days per week: Not on file     Minutes per session: Not on file    Stress: Not on file   Relationships    Social connections     Talks on phone: Not on file     Gets together: Not on file     Attends Restorationism service: Not on file     Active member of club or organization: Not on file     Attends meetings of clubs or organizations: Not on file     Relationship status: Not on file    Intimate partner violence     Fear of current or ex partner: Not on file     Emotionally abused: Not on file     Physically abused: Not on file     Forced sexual activity: Not on file   Other Topics Concern    Not on file   Social History Narrative    Not on file     Social History     Social History Narrative    Not on file        Social History    None             OBJECTIVE:     Mental Status Evaluation:    Appearance age appropriate, casually dressed   Behavior pleasant, cooperative   Speech normal volume, normal pitch   Mood Slightly worried   Affect Mood-congruent   Thought Processes logical   Associations intact associations   Thought Content normal   Perceptual Disturbances: none   Abnormal Thoughts  Risk Potential Suicidal ideation - None  Homicidal ideation - None  Potential for aggression - No   Orientation oriented to person, place, time/date and situation   Memory recent and remote memory grossly intact   Cosciousness alert and awake   Attention Span attention span and concentration are age appropriate   Intellect Not formally assessed   Insight age appropriate    Judgement good    Muscle Strength and  Gait Not observed   Language no difficulty naming common objects   Fund of Knowledge displays adequate knowledge of current events   Pain none   Pain Scale 0       Assessment/Plan:       Diagnoses and all orders for this visit:    Moderate bipolar I disorder, current or most recent episode manic, with mixed features ([facility] [address] )          Treatment Recommendations/Precautions:    Decrease alprazolam 0 25 mg from BID PRN to qd PRN anxiety/panic next time refill is requested  Continue other current medications: Vraylar 3 mg one cap po qd; Adderall XR 10 mg one cap po qAM;    Risks/Benefits      Risks, Benefits And Possible Side Effects Of Medications:    Risks, benefits, and possible side effects of medications explained to patient and patient verbalizes understanding and agreement for treatment  Controlled Medication Discussion:      DARELL SINGH [employer] website data was reviewed with pt  Discussed with patient the risks of sedation, respiratory depression, impairment of ability to drive and potential for abuse and addiction related to treatment with ALPRAZOLAM  The patient understands risk of treatment with ALPRAZOLAM, agrees to not drive if feeling impaired, not to request early refills, to take medication as prescribed, and to not share it with others  [facility]ARELL SINGH [employer] website data was reviewed with pt  Discussed with patient the risks of potential for abuse and addiction related to treatment with ADDERALL  The patient understands risk of treatment with ADDERALL, agrees to not request early refills, to take medication as prescribed, and to not share it with others        Psychotherapy Provided:     Individual psychotherapy provided: No

## 2021-01-19 ENCOUNTER — TELEPHONE (OUTPATIENT)
Dept: BEHAVIORAL/MENTAL HEALTH CLINIC | Facility: CLINIC | Age: 26
End: 2021-01-19

## 2021-01-28 DIAGNOSIS — F90.0 ATTENTION DEFICIT HYPERACTIVITY DISORDER, INATTENTIVE TYPE: ICD-10-CM

## 2021-01-29 RX ORDER — DEXTROAMPHETAMINE SACCHARATE, AMPHETAMINE ASPARTATE MONOHYDRATE, DEXTROAMPHETAMINE SULFATE AND AMPHETAMINE SULFATE 2.5; 2.5; 2.5; 2.5 MG/1; MG/1; MG/1; MG/1
10 CAPSULE, EXTENDED RELEASE ORAL EVERY MORNING
Qty: 30 CAPSULE | Refills: 0 | Status: SHIPPED | OUTPATIENT
Start: 2021-01-29 | End: 2021-03-02 | Stop reason: SDUPTHER

## 2021-03-02 DIAGNOSIS — F90.0 ATTENTION DEFICIT HYPERACTIVITY DISORDER, INATTENTIVE TYPE: ICD-10-CM

## 2021-03-02 DIAGNOSIS — F41.1 GENERALIZED ANXIETY DISORDER: ICD-10-CM

## 2021-03-02 RX ORDER — DEXTROAMPHETAMINE SACCHARATE, AMPHETAMINE ASPARTATE MONOHYDRATE, DEXTROAMPHETAMINE SULFATE AND AMPHETAMINE SULFATE 2.5; 2.5; 2.5; 2.5 MG/1; MG/1; MG/1; MG/1
10 CAPSULE, EXTENDED RELEASE ORAL EVERY MORNING
Qty: 30 CAPSULE | Refills: 0 | Status: SHIPPED | OUTPATIENT
Start: 2021-03-02 | End: 2021-04-05 | Stop reason: SDUPTHER

## 2021-03-02 RX ORDER — ALPRAZOLAM 0.25 MG/1
0.25 TABLET ORAL DAILY
Qty: 30 TABLET | Refills: 0 | Status: SHIPPED | OUTPATIENT
Start: 2021-03-02 | End: 2021-09-09 | Stop reason: SDUPTHER

## 2021-03-11 ENCOUNTER — TELEPHONE (OUTPATIENT)
Dept: PSYCHIATRY | Facility: CLINIC | Age: 26
End: 2021-03-11

## 2021-03-15 ENCOUNTER — TELEMEDICINE (OUTPATIENT)
Dept: PSYCHIATRY | Facility: CLINIC | Age: 26
End: 2021-03-15
Payer: COMMERCIAL

## 2021-03-15 DIAGNOSIS — F31.12: Primary | ICD-10-CM

## 2021-03-15 PROCEDURE — 99214 OFFICE O/P EST MOD 30 MIN: CPT | Performed by: NURSE PRACTITIONER

## 2021-03-15 RX ORDER — LEVONORGESTREL AND ETHINYL ESTRADIOL 0.1-0.02MG
KIT ORAL
COMMUNITY
Start: 2020-12-29

## 2021-03-15 NOTE — PSYCH
PROGRESS NOTE        Vikas Ramires      Name and Date of Birth:  Demetria Leal 30 y o  1995    Date of Visit: 03/15/21    Pt was seen today for medication management for 30 minutes via Teams with pts consent  Door to office was closed, provider was alone in office  Time spent on Epic chart review of relevant information, note composition, and after-visit plan:  10 minutes  Total time for this visit: 40 minutes    SUBJECTIVE: had significant weight gain, 10 lbs, weighs 120 now was about 108 when she started gaining weight, could be from school too  Her breasts are enlarged, not sore, started leaking when she and her partner were intimate, she first noticed a week ago and is happening more now, she is not wearing breast pads is just washing her bras  Her moods are not labile, she has excess energy but not productive energy, anxiety  Not feeling 100% back to herself since the manic episode  More anxious  School, work, pandemic, all variables  Working 2 days per week, going to [employer], two courses at a time this semester, will take advanced health assessment and psychopharmacology  1/11/2021:  started talking alprazolam before her shift and it helps a little with her hand tremors and self-consciousness; usually doesn't take the second dose  Feels more calm and in control, moods generally stable  Starting Spring semester in [address] program next Tuesday  Decrease alprazolam 0 25 mg from BID PRN to qd PRN anxiety/panic next time refill is requested  Continue: Vraylar 3 mg one cap po qd; Adderall XR 10 mg one cap po qAM      She denies suicidal ideation, intent or plan at present, has no suicidal ideation, intent or plan at present  She denies any auditory hallucinations and visual hallucinations, denies any other delusional thinking, denies any delusional thinking  She denies any side effects from medications      HPI ROS Appetite Changes and Sleep: normal appetite, normal sleep    Review Of Systems:      Constitutional Negative   ENT Negative   Cardiovascular Negative   Respiratory Negative   Gastrointestinal Negative   Genitourinary Negative   Musculoskeletal Negative   Integumentary Negative   Neurological Negative   Endocrine Negative   Other Symptoms Breasts leaking small amount of fluid       Laboratory Results: No results found for this or any previous visit  Substance Abuse History:    Social History     Substance and Sexual Activity   Drug Use Not on file       Family Psychiatric History:     No family history on file      The following portions of the patient's history were reviewed and updated as appropriate: past family history, past medical history, past social history, past surgical history and problem list     Social History     Socioeconomic History    Marital status: Single     Spouse name: Not on file    Number of children: Not on file    Years of education: Not on file    Highest education level: Not on file   Occupational History    Not on file   Social Needs    Financial resource strain: Not on file    Food insecurity     Worry: Not on file     Inability: Not on file    Transportation needs     Medical: Not on file     Non-medical: Not on file   Tobacco Use    Smoking status: Not on file   Substance and Sexual Activity    Alcohol use: Not on file    Drug use: Not on file    Sexual activity: Not on file   Lifestyle    Physical activity     Days per week: Not on file     Minutes per session: Not on file    Stress: Not on file   Relationships    Social connections     Talks on phone: Not on file     Gets together: Not on file     Attends Bahai service: Not on file     Active member of club or organization: Not on file     Attends meetings of clubs or organizations: Not on file     Relationship status: Not on file    Intimate partner violence     Fear of current or ex partner: Not on file     Emotionally abused: Not on file     Physically abused: Not on file     Forced sexual activity: Not on file   Other Topics Concern    Not on file   Social History Narrative    Not on file     Social History     Social History Narrative    Not on file        Social History    None             OBJECTIVE:     Mental Status Evaluation:    Appearance age appropriate, casually dressed   Behavior pleasant, cooperative   Speech normal volume, normal pitch   Mood Worried, unhappy   Affect Mood-congruent   Thought Processes logical   Associations intact associations   Thought Content normal   Perceptual Disturbances: none   Abnormal Thoughts  Risk Potential Suicidal ideation - None  Homicidal ideation - None  Potential for aggression - No   Orientation oriented to person, place, time/date and situation   Memory recent and remote memory grossly intact   Cosciousness alert and awake   Attention Span attention span and concentration are age appropriate   Intellect Not formally assessed   Insight age appropriate    Judgement good    Muscle Strength and  Gait muscle strength and tone were normal   Language no difficulty naming common objects   Fund of Knowledge displays adequate knowledge of current events   Pain none   Pain Scale 0       Assessment/Plan:       Diagnoses and all orders for this visit:    Moderate bipolar I disorder, current or most recent episode manic, with mixed features ([facility] [address] )    Other orders  -     Multiple Vitamin (MULTI-VITAMIN DAILY PO); Take by mouth  -     Ruben Cowan EQ 0 1-20 MG-MCG per tablet          Treatment Recommendations/Precautions:    Stop: Vraylar 3 mg cap 1 cap po qd  Continue current medications: alprazolam 0 25 mg tab 1 tab qd PRN anxiety/panic; Adderall XR 10 mg one cap po qAM  Pt will contact GYN to discuss; is prolactin level, other labs suggested, is GYN going to order them?  Consider replacing Vraylar with aripiprazole 5 mg tab 1 tab po qd after labs, if any, are completed    Risks/Benefits      Risks, Benefits And Possible Side Effects Of Medications:    Risks, benefits, and possible side effects of medications explained to patient and patient verbalizes understanding and agreement for treatment  Controlled Medication Discussion:     DARELL SINGH [employer] website data was reviewed with pt  Discussed with patient the risks of sedation, respiratory depression, impairment of ability to drive and potential for abuse and addiction related to treatment with ALPRAZOLAM  The patient understands risk of treatment with ALPRAZOLAM, agrees to not drive if feeling impaired, to not take it when drinking alcohol, to not request early refills, to take medication as prescribed, and to not share it with others      DARELL SINGH [employer] website data was reviewed with pt  Discussed with patient the risks of potential for abuse and addiction related to treatment with ADDERALL  The patient understands risk of treatment with ADDERALL, agrees to not request early refills, to take medication as prescribed, and to not share it with others      Psychotherapy Provided:     Individual psychotherapy provided: No

## 2021-03-19 DIAGNOSIS — F31.73: ICD-10-CM

## 2021-03-19 DIAGNOSIS — F31.12: Primary | ICD-10-CM

## 2021-03-19 RX ORDER — ARIPIPRAZOLE 5 MG/1
5 TABLET ORAL DAILY
Qty: 30 TABLET | Refills: 2 | Status: SHIPPED | OUTPATIENT
Start: 2021-03-19 | End: 2021-06-30 | Stop reason: SDUPTHER

## 2021-03-25 ENCOUNTER — TELEPHONE (OUTPATIENT)
Dept: PSYCHIATRY | Facility: CLINIC | Age: 26
End: 2021-03-25

## 2021-04-05 ENCOUNTER — TELEMEDICINE (OUTPATIENT)
Dept: PSYCHIATRY | Facility: CLINIC | Age: 26
End: 2021-04-05
Payer: COMMERCIAL

## 2021-04-05 DIAGNOSIS — F90.0 ATTENTION DEFICIT HYPERACTIVITY DISORDER, INATTENTIVE TYPE: ICD-10-CM

## 2021-04-05 DIAGNOSIS — F41.1 GENERALIZED ANXIETY DISORDER: ICD-10-CM

## 2021-04-05 DIAGNOSIS — T50.905D ADVERSE DRUG EFFECT, SUBSEQUENT ENCOUNTER: ICD-10-CM

## 2021-04-05 DIAGNOSIS — F31.73: Primary | ICD-10-CM

## 2021-04-05 PROBLEM — I73.00 RAYNAUD PHENOMENON: Status: ACTIVE | Noted: 2019-04-19

## 2021-04-05 PROBLEM — M41.20 IDIOPATHIC SCOLIOSIS: Status: ACTIVE | Noted: 2020-04-28

## 2021-04-05 PROBLEM — M53.3 PAIN OF BOTH SACROILIAC JOINTS: Status: ACTIVE | Noted: 2020-04-28

## 2021-04-05 PROBLEM — M54.16 LUMBAR RADICULITIS: Status: ACTIVE | Noted: 2021-03-22

## 2021-04-05 PROCEDURE — 90833 PSYTX W PT W E/M 30 MIN: CPT | Performed by: NURSE PRACTITIONER

## 2021-04-05 PROCEDURE — 99214 OFFICE O/P EST MOD 30 MIN: CPT | Performed by: NURSE PRACTITIONER

## 2021-04-05 RX ORDER — DEXTROAMPHETAMINE SACCHARATE, AMPHETAMINE ASPARTATE MONOHYDRATE, DEXTROAMPHETAMINE SULFATE AND AMPHETAMINE SULFATE 2.5; 2.5; 2.5; 2.5 MG/1; MG/1; MG/1; MG/1
10 CAPSULE, EXTENDED RELEASE ORAL EVERY MORNING
Qty: 30 CAPSULE | Refills: 0 | Status: SHIPPED | OUTPATIENT
Start: 2021-04-05 | End: 2021-04-28 | Stop reason: SDUPTHER

## 2021-04-05 RX ORDER — MELOXICAM 15 MG/1
TABLET ORAL
COMMUNITY
Start: 2021-03-22

## 2021-04-05 RX ORDER — CYCLOBENZAPRINE HCL 5 MG
TABLET ORAL
COMMUNITY
Start: 2021-03-22

## 2021-04-05 NOTE — PSYCH
PROGRESS NOTE        Vikas Ramires      Name and Date of Birth:  Marlo Burks 30 y o  1995    Date of Visit: 04/05/21    Pt was spoken to today by phone for medication management and psychotherapy; pt was unable to connect via Teams  Door to office was closed; provider was alone in office, pt aware and consented to phone session  Time spent on psychotherapy: 16 Minutes    Treatment modality: medication management; medication education; supportive re persisting toward goals      SUBJECTIVE: Pt says that she still has a little bit of breast leaking, specifically if she squeezes her breasts a drop comes out, but breasts are not enlarged anymore  Stopped taking Gwendlyn Hernández as discussed, and has been taking aripiprazole, thinks she is doing ok, has been a little depressed for the last two to three weeks and thinks it may be related to her back injury - the injury happened a month ago, she has no idea where it happened, could be at gymnastics could be at work, she had been excited about exercising and losing weight then this happened,m she is having PT and is hopeful that it will work, took [address] and has Rx for [employer]  Adderall continues to be effective for inattentiveness/concentration  She does not take alprazolam often and does not need a refill of it today  Discussed with pt via phone discontinuing Vraylar and starting aripiprazole; pt discussed with her GYN who agreed; aripiprazole 5 mg tab 1 tab po qd prescribed, pt stopped Vraylar and agreed with change of medication  Prolactin level reported 3/18/2021: reference range for Non-Pregnant: 2 8 - 29 2 ng/mL; pt level 22 ng/mL - within normal range    3/15/2021: had significant weight gain, 10 lbs, weighs 120 now was about 108 when she started gaining weight, could be from school too   Her breasts are enlarged, not sore, started leaking when she and her partner were intimate, she first noticed a week ago and is happening more now, she is not wearing breast pads is just washing her bras  Her moods are not labile, she has excess energy but not productive energy, anxiety  Not feeling 100% back to herself since the manic episode  More anxious  School, work, pandemic, all variables  Working 2 days per week, going to [employer], two courses at a time this semester, will take advanced health assessment and psychopharmacology  Stop: Vraylar 3 mg cap 1 cap po qd  Continue current medications: alprazolam 0 25 mg tab 1 tab qd PRN anxiety/panic; Adderall XR 10 mg one cap po qAM  Pt will contact GYN to discuss; is prolactin level, other labs suggested, is GYN going to order them? Consider replacing Vraylar with aripiprazole 5 mg tab 1 tab po qd after labs, if any, are completed     1/11/2021:  started talking alprazolam before her shift and it helps a little with her hand tremors and self-consciousness; usually doesn't take the second dose  Feels more calm and in control, moods generally stable  Starting Spring semester in [address] program next Tuesday  Decrease alprazolam 0 25 mg from [facility] to qd PRN anxiety/panic next time refill is requested  Continue: Vraylar 3 mg one cap po qd; Adderall XR 10 mg one cap po qAM      She denies suicidal ideation, intent or plan at present, has no suicidal ideation, intent or plan at present  She denies any auditory hallucinations and visual hallucinations, denies any other delusional thinking, denies any delusional thinking  She denies any side effects from medications      HPI ROS Appetite Changes and Sleep: normal appetite, normal sleep - she wakes up for an hour every night because of her back hurting takes something for pain and goes back to sleep     Review Of Systems:      Constitutional Negative   ENT Negative   Cardiovascular Negative   Respiratory Negative   Gastrointestinal Negative   Genitourinary Negative   Musculoskeletal Negative   Integumentary Negative Neurological Negative   Endocrine Negative   Other Symptoms Negative and None       Laboratory Results: No results found for this or any previous visit  Substance Abuse History:    Social History     Substance and Sexual Activity   Drug Use Not on file       Family Psychiatric History:     No family history on file      The following portions of the patient's history were reviewed and updated as appropriate: past family history, past medical history, past social history, past surgical history and problem list     Social History     Socioeconomic History    Marital status: Single     Spouse name: Not on file    Number of children: Not on file    Years of education: Not on file    Highest education level: Not on file   Occupational History    Not on file   Social Needs    Financial resource strain: Not on file    Food insecurity     Worry: Not on file     Inability: Not on file    Transportation needs     Medical: Not on file     Non-medical: Not on file   Tobacco Use    Smoking status: Not on file   Substance and Sexual Activity    Alcohol use: Not on file    Drug use: Not on file    Sexual activity: Not on file   Lifestyle    Physical activity     Days per week: Not on file     Minutes per session: Not on file    Stress: Not on file   Relationships    Social connections     Talks on phone: Not on file     Gets together: Not on file     Attends [employer] service: Not on file     Active member of club or organization: Not on file     Attends meetings of clubs or organizations: Not on file     Relationship status: Not on file    Intimate partner violence     Fear of current or ex partner: Not on file     Emotionally abused: Not on file     Physically abused: Not on file     Forced sexual activity: Not on file   Other Topics Concern    Not on file   Social History Narrative    Not on file     Social History     Social History Narrative    Not on file        Social History    None OBJECTIVE:     Mental Status Evaluation:    Appearance  Phone appointment, not assessed   Behavior pleasant, cooperative   Speech normal volume, normal pitch   Mood blah   Affect  Phone appointment, not assessed   Thought Processes Coherent, organized, goal-directed   Associations intact associations   Thought Content normal   Perceptual Disturbances: none   Abnormal Thoughts  Risk Potential Suicidal ideation - None  Homicidal ideation - None  Potential for aggression - No   Orientation oriented to person, place, time/date and situation   Memory recent and remote memory grossly intact   Cosciousness alert and awake   Attention Span attention span and concentration are age appropriate   Intellect Not formally assessed   Insight age appropriate    Judgement good    Muscle Strength and  Gait  Phone appointment, not assessed   Language no difficulty naming common objects   Fund of Knowledge displays adequate knowledge of current events   Pain none   Pain Scale 0       Assessment/Plan:       Diagnoses and all orders for this visit:    Moderate bipolar I disorder, current or most recent episode manic, in partial remission (HCC)    Generalized anxiety disorder    Attention deficit hyperactivity disorder, inattentive type          Treatment Recommendations/Precautions:    Continue current medications: alprazolam 0 25 mg tab 1 tab qd PRN anxiety/panic; Adderall XR 10 mg one cap po qAM; aripiprazole 5 mg tab 1 tab po qAM    Risks/Benefits      Risks, Benefits And Possible Side Effects Of Medications:    Risks, benefits, and possible side effects of medications explained to patient and patient verbalizes understanding and agreement for treatment  Controlled Medication Discussion:     DARELL SINGH  website data was reviewed with pt   Discussed with patient the risks of sedation, respiratory depression, impairment of ability to drive and potential for abuse and addiction related to treatment with ALPRAZOLAM  The patient understands risk of treatment with ALPRAZOLAM, agrees to not drive if feeling impaired, to not take it when drinking alcohol, to not request early refills, to take medication as prescribed, and to not share it with others      DARELL SINGH  website data was reviewed with pt  Discussed with patient the risks of potential for abuse and addiction related to treatment with ADDERALL  The patient understands risk of treatment with ADDERALL, agrees to not request early refills, to take medication as prescribed, and to not share it with others

## 2021-04-09 ENCOUNTER — TELEMEDICINE (OUTPATIENT)
Dept: BEHAVIORAL/MENTAL HEALTH CLINIC | Facility: CLINIC | Age: 26
End: 2021-04-09
Payer: COMMERCIAL

## 2021-04-09 DIAGNOSIS — F43.10 POSTTRAUMATIC STRESS DISORDER: Chronic | ICD-10-CM

## 2021-04-09 DIAGNOSIS — F31.12 BIPOLAR 1 DISORDER WITH MODERATE MANIA (HCC): ICD-10-CM

## 2021-04-09 DIAGNOSIS — F41.1 GENERALIZED ANXIETY DISORDER: Primary | Chronic | ICD-10-CM

## 2021-04-09 PROCEDURE — 90834 PSYTX W PT 45 MINUTES: CPT | Performed by: SOCIAL WORKER

## 2021-04-09 NOTE — PSYCH
Virtual Regular Visit      Assessment/Plan:    Problem List Items Addressed This Visit        Other    Posttraumatic stress disorder (Chronic)    Generalized anxiety disorder - Primary (Chronic)    Bipolar 1 disorder with moderate willard ([facility] [address] )               Reason for visit is No chief complaint on file  Encounter provider Dave Goldberg    Provider located at [address]  [address]  [place] [address]  [phone]      Recent Visits  No visits were found meeting these conditions  Showing recent visits within past 7 days and meeting all other requirements     Today's Visits  Date Type Provider Dept   04/09/21 Telemedicine Dave Goldberg [facility] Therapist [facility]   Showing today's visits and meeting all other requirements     Future Appointments  No visits were found meeting these conditions  Showing future appointments within next 150 days and meeting all other requirements        The patient was identified by name and date of birth  Jazmin Gar was informed that this is a telemedicine visit and that the visit is being conducted through {AMB CORONAVIRUS VISIT UOCDND:16363}  {Telemedicine confidentiality :80283} {Telemedicine participants:90452}  She acknowledged consent and understanding of privacy and security of the video platform  The patient has agreed to participate and understands they can discontinue the visit at any time  Patient is aware this is a billable service  Subjective  Jazmin Gar is a 30 y o  female ***   HPI     No past medical history on file  No past surgical history on file      Current Outpatient Medications   Medication Sig Dispense Refill    ALPRAZolam (XANAX) 0 25 mg tablet Take 1 tablet (0 25 mg total) by mouth daily 30 tablet 0    amphetamine-dextroamphetamine (ADDERALL XR) 10 MG 24 hr capsule Take 1 capsule (10 mg total) by mouth every morningMax Daily Amount: 10 mg 30 capsule 0    ARIPiprazole (ABILIFY) 5 mg tablet Take 1 tablet (5 mg total) by mouth daily 30 tablet 2    Aubra EQ 0 1-20 MG-MCG per tablet       cyclobenzaprine (FLEXERIL) 5 mg tablet TAKE 1 TABLET BY MOUTH DAILY FOR MUSCLE SPASM      EPINEPHrine (EPIPEN) 0 3 mg/0 3 mL SOAJ Inject as directed      meloxicam (MOBIC) 15 mg tablet TAKE 1 TABLET BY MOUTH DAILY FOR 10 DAYS      Multiple Vitamin (MULTI-VITAMIN DAILY PO) Take by mouth       No current facility-administered medications for this visit  Allergies   Allergen Reactions    Cashew Nut Oil - Food Allergy      Allergic to cashews       Review of Systems    Video Exam    There were no vitals filed for this visit  Physical Exam     {covid time spent:04675}      VIRTUAL VISIT DISCLAIMER    Edel Riley acknowledges that she has consented to an online visit or consultation  She understands that the online visit is based solely on information provided by her, and that, in the absence of a face-to-face physical evaluation by the physician, the diagnosis she receives is both limited and provisional in terms of accuracy and completeness  This is not intended to replace a full medical face-to-face evaluation by the physician  Edel Riley understands and accepts these terms

## 2021-04-09 NOTE — PSYCH
This note was not shared with the patient due to this is a psychotherapy note     Virtual Regular Visit    Encounter provider Travis Kauffman    Provider located at [address]  [address]  [place] [address]  [phone]      Recent Visits  No visits were found meeting these conditions  Showing recent visits within past 7 days and meeting all other requirements     Today's Visits  Date Type Provider Dept   04/09/21 Telemedicine Travis Kauffman [facility] Therapist [facility]   Showing today's visits and meeting all other requirements     Future Appointments  No visits were found meeting these conditions  Showing future appointments within next 150 days and meeting all other requirements        The patient was identified by name and date of birth  Sheng Black was informed that this is a telemedicine visit and that the visit is being conducted through [employer] and patient was informed that this is a secure, HIPAA-compliant platform  She agrees to proceed     My office door was closed  No one else was in the room  She acknowledged consent and understanding of privacy and security of the video platform  The patient has agreed to participate and understands they can discontinue the visit at any time  Patient is aware this is a billable service  Psychotherapy Provided: Individual Psychotherapy 45 minutes     Length of time in session: 45 minutes, follow up in 1 week    Goals addressed in session: Goal 1     DATA: Vernestine Antis presented for a virtual individual session  Writer spent session rapport building with Lakia Vila as this was first session with Writer since Vernestine Antis was transferred from previous therapist  Lakia Vila shared that she was diagnosed with Bipolar Disorder over the summer which she is "adjusting" to  Vernestine Antis discussed stressors, triggers to anxiety and goals  Writer created treatment plan and received verbal consent from [place]  ASSESSMENT: Daniel Ruiz presented in a neutral mood with congruent affect  [place] was open, cooperative and oriented X3 during session  Daniel Ruiz became tearful at times as she discussed more challenging topics  Daniel Ruiz is struggling with anxiety and fear of becoming "manic " [place] shares healthy insight and is motivated toward treatment  PLAN: Continue with individual psychotherapy, weekly, and medication monitoring as recommended by [facility]  Current suicide risk : Low     Behavioral Health Treatment Plan [facility]: Diagnosis and Treatment Plan explained to [place] relates understanding diagnosis and is agreeable to Treatment Plan  Yes     I spent 45 minutes directly with the patient during this visit      Suhas5 Noe Samaniego acknowledges that she has consented to an online visit or consultation  She understands that the online visit is based solely on information provided by her, and that, in the absence of a face-to-face physical evaluation by the physician, the diagnosis she receives is both limited and provisional in terms of accuracy and completeness  This is not intended to replace a full medical face-to-face evaluation by the physician  Rola Flores understands and accepts these terms

## 2021-04-09 NOTE — BH TREATMENT PLAN
Kyra Garcia  1995       Date of Initial Treatment Plan: First TX plan with new Therapist 4/9/2021   Date of Current Treatment Plan: 04/09/21    Treatment Plan Number 1     Strengths/Personal Resources for Self Care: Persistent, "I have a big heart," motivated toward treatment  Diagnosis:   1  Generalized anxiety disorder     2  Posttraumatic stress disorder     3  Bipolar 1 disorder with moderate willard (HCC)         Area of Needs: adjusting to diagnosis of Bipolar, self-awareness, mood regulation      Long Term Goal 1: Radhadana Waldron will increase self-awareness regarding symptoms of Bipolar Disorder  Target Date: 7/9/2021  Completion Date: N/A         Short Term Objectives for Goal 1:    DANA  Radhadana Waldron will explore and process her experience and symptoms of Bipolar Disorder during session  B  Radhadana Waldron will receive psychoeducation regarding Bipolar Disorder to assist with understanding of symptoms  Bashir Price will process thoughts adn feelings regarding her diagnosis of Bipolar Disorder during session  Long Term Goal 2: Radhadana Waldron will utilize healthy coping skills to assist with regulating mood  Target Date: 7/9/2021  Completion Date: N/A    Short Term Objectives for Goal 2:    A  Radha Waldron will identify current use of healthy coping skills  B  Radha Waldron will receive psycho-education regarding skills to help regulate mood  Bashir Price will implement learned skills and process barriers and/or successes to regulating mood      GOAL 1: Modality: Individual 4x per month   Completion Date 7/9/2021, Medication Management and The person(s) responsible for carrying out the plan is  NAZIA Blount, Therapist     GOAL 2: Modality: Individual 4x per month   Completion Date 7/9/2021, Medication Management and The person(s) responsible for carrying out the plan is  ANZIA Blount, Therapist           [address]: Diagnosis and Treatment Plan explained to Radha Waldron, Azra Ibarra relates understanding diagnosis and is agreeable to Treatment Plan         Client Comments : Please share your thoughts, feelings, need and/or experiences regarding your treatment plan: "Yes, I agree "

## 2021-04-16 ENCOUNTER — TELEMEDICINE (OUTPATIENT)
Dept: BEHAVIORAL/MENTAL HEALTH CLINIC | Facility: CLINIC | Age: 26
End: 2021-04-16
Payer: COMMERCIAL

## 2021-04-16 DIAGNOSIS — F41.1 GENERALIZED ANXIETY DISORDER: Chronic | ICD-10-CM

## 2021-04-16 DIAGNOSIS — F31.12 BIPOLAR 1 DISORDER WITH MODERATE MANIA (HCC): Primary | ICD-10-CM

## 2021-04-16 DIAGNOSIS — F43.10 POSTTRAUMATIC STRESS DISORDER: Chronic | ICD-10-CM

## 2021-04-16 PROCEDURE — 90834 PSYTX W PT 45 MINUTES: CPT | Performed by: SOCIAL WORKER

## 2021-04-16 NOTE — PSYCH
This note was not shared with the patient due to this is a psychotherapy note     Virtual Regular Visit      Assessment/Plan:    Problem List Items Addressed This Visit        Other    Posttraumatic stress disorder (Chronic)    Generalized anxiety disorder (Chronic)    Bipolar 1 disorder with moderate willard ([facility] [address] ) - Primary             Encounter provider Ros Reynolds    Provider located at [address] [place] [address]  [address]  [place] [address]  [phone]      Recent Visits  Date Type Provider Dept   04/09/21 [address] [place] [address] [facility] [place]   Showing recent visits within past 7 days and meeting all other requirements     Today's Visits  Date Type Provider Dept   04/16/21 [address] [place] [address] [facility] [place]   Showing today's visits and meeting all other requirements     Future Appointments  No visits were found meeting these conditions  Showing future appointments within next 150 days and meeting all other requirements        The patient was identified by name and date of birth  Warren Herrera was informed that this is a telemedicine visit and that the visit is being conducted through [employer] and patient was informed that this is a secure, HIPAA-compliant platform  She agrees to proceed     My office door was closed  No one else was in the room  She acknowledged consent and understanding of privacy and security of the video platform  The patient has agreed to participate and understands they can discontinue the visit at any time  Patient is aware this is a billable service  Psychotherapy Provided: Individual Psychotherapy 45 minutes     Length of time in session: 45 minutes, follow up in 1 week    Goals addressed in session: Goal 1     DATA: Clemente Monreal presented for virtual session   Clemente Monreal reported that today is her birthday  Marino Baca discussed celebrating with friends earlier in the week  Marino Baca reported that she has been struggling with body image recently  Marino Baca discussed recent weigh gain, likely due to the medication she is taking  Marino Baca discussed feeling unhappy with herself and her lack of will power with eating habits  Marino Baca identified low motivation and agreed that her mood has been somewhat depressed  Marino Baca discussed anxiety related to worry about going into a manic episode  Writer identified the value of support and self-awareness of Ambrianna to assist with preventing or de-escalating willard should it occur again  Writer provide psycheducation to Marino Baca regarding emotional grounding and encouraged Marino Baca to try some of the skills between today and next session; Marino Baca agreed  ASSESSMENT: Marino Baca presented in a neutral mood with congruent affect  Marino Baca was oriented X3, cooperative and open throughout session  Marino Baca continues to experience feelings of anxiety and fear about the potential of having another manic episode  Marino Baca is exhibiting symptoms of depression such as low motivation, low self-esteem and depressed mood  SI/HI not reported or observed during session  PLAN: Marino Baca will try out emotional grounding skills  Continue with weekly individual psychotherapy  Current suicide risk : Low       [facility] Treatment Plan [facility]: Diagnosis and Treatment Plan explained to Alla d'[place]voire relates understanding diagnosis and is agreeable to Treatment Plan  Yes     I spent 45 minutes directly with the patient during this visit      [address] acknowledges that she has consented to an online visit or consultation   She understands that the online visit is based solely on information provided by her, and that, in the absence of a face-to-face physical evaluation by the physician, the diagnosis she receives is both limited and provisional in terms of accuracy and completeness  This is not intended to replace a full medical face-to-face evaluation by the physician  Sheng Black understands and accepts these terms

## 2021-04-23 ENCOUNTER — TELEMEDICINE (OUTPATIENT)
Dept: BEHAVIORAL/MENTAL HEALTH CLINIC | Facility: CLINIC | Age: 26
End: 2021-04-23
Payer: COMMERCIAL

## 2021-04-23 DIAGNOSIS — F43.10 POSTTRAUMATIC STRESS DISORDER: Chronic | ICD-10-CM

## 2021-04-23 DIAGNOSIS — F31.12 BIPOLAR 1 DISORDER WITH MODERATE MANIA (HCC): Primary | ICD-10-CM

## 2021-04-23 DIAGNOSIS — F41.1 GENERALIZED ANXIETY DISORDER: Chronic | ICD-10-CM

## 2021-04-23 PROCEDURE — 90834 PSYTX W PT 45 MINUTES: CPT | Performed by: SOCIAL WORKER

## 2021-04-23 NOTE — PSYCH
This note was not shared with the patient due to this is a psychotherapy note     Psychotherapy Provided: Individual Psychotherapy 45 minutes     Length of time in session: 45 minutes, follow up in 1 week    Encounter Diagnosis     ICD-10-CM    1  Bipolar 1 disorder with moderate willard ([facility] [address] )  F31 12    2  Generalized anxiety disorder  F41 1    3  Posttraumatic stress disorder  F43 10        Goals addressed in session: Goal 1 and Goal 2     Data: ***    Assessment: ***    Plan: ***    Current suicide risk : Low     Behavioral Health Treatment Plan  [facility]: Diagnosis and Treatment Plan explained to Alla d'Eliezeroirradha relates understanding diagnosis and is agreeable to Treatment Plan   Yes

## 2021-04-23 NOTE — PSYCH
This note was not shared with the patient due to this is a psychotherapy note    Virtual Regular Visit      Assessment/Plan:    Problem List Items Addressed This Visit        Other    Posttraumatic stress disorder (Chronic)    Generalized anxiety disorder (Chronic)    Bipolar 1 disorder with moderate willard ([facility] [address] ) - Primary          Goals addressed in session: Goal 1 and Goal 2        Encounter provider Sonia Arredondo    Provider located at [address] [place] [address]  [address]  [place] [address]  [phone]      Recent Visits  Date Type Provider Dept   04/16/21 [address] [place] [address] [facility] [place]   Showing recent visits within past 7 days and meeting all other requirements     Today's Visits  Date Type Provider Dept   04/23/21 [address] [place] [address] [facility] [place]   Showing today's visits and meeting all other requirements     Future Appointments  No visits were found meeting these conditions  Showing future appointments within next 150 days and meeting all other requirements     The patient was identified by name and date of birth  Fawnmarissa Reese was informed that this is a telemedicine visit and that the visit is being conducted through [employer] and patient was informed that this is a secure, HIPAA-compliant platform  She agrees to proceed     My office door was closed  No one else was in the room  She acknowledged consent and understanding of privacy and security of the video platform  The patient has agreed to participate and understands they can discontinue the visit at any time  Patient is aware this is a billable service  Psychotherapy Provided: Individual Psychotherapy 45 minutes     Length of time in session: 45 minutes, follow up in 1 week    Encounter Diagnosis     ICD-10-CM    1   Bipolar 1 disorder with moderate willard ([facility] )  F31 12    2  Generalized anxiety disorder  F41 1    3  Posttraumatic stress disorder  F43 10        Data: Mariluz Mckeon reported that she had difficulty getting up this morning due to pain in her back and feeling fatigued  Mariluz Mckeon reported that she feels this way most days, but has been pushing herself to get up  Mariluz Mckeon reported that she feels as though she cannot sleep in because she will be "wasting" her day  Writer explored this with Mariluz Mckeon, and discussed unrealistic vs  Realistic expectations  Mariluz Mckeon discussed stress from school and work  Mariluz Mckeon also discussed feeling upset with her body at this time as she would like to lose a few pound and get in better shape  Writer and Mariluz Mckeon discussed barriers to her goals  Writer and Mariluz Mckeon also discussed the value of fitting in self-care, even in the smallest ways, to help relieve stress  Assessment: Mariluz Mckeon presented in a neutral mood with congruent affect  Mariluz Mckeon was oriented X3, cooperative and open during session  Mariluz Mckeon is struggling with depression and anxiety due to many significant responsibilities in life, dysphoric self-image and unrealistic expectations  Mariluz Mckeon is able to verbalize insight regarding her symptoms but struggles to implement changes, feeling "stuck " SI/HI not reported or observed during session  Plan: Mariluz Mckeon agreed to engage in at least one hour of self-care this week  Continue with weekly individual psychotherapy  Current suicide risk : Low     [facility] Treatment [facility]: Diagnosis and Treatment Plan explained to Alla d'[place] relates understanding diagnosis and is agreeable to Treatment Plan  Yes       I spent 45 minutes directly with the patient during this visit      [address] acknowledges that she has consented to an online visit or consultation   She understands that the online visit is based solely on information provided by her, and that, in the absence of a face-to-face physical evaluation by the physician, the diagnosis she receives is both limited and provisional in terms of accuracy and completeness  This is not intended to replace a full medical face-to-face evaluation by the physician  Mateo Fuller understands and accepts these terms

## 2021-04-28 DIAGNOSIS — F90.0 ATTENTION DEFICIT HYPERACTIVITY DISORDER, INATTENTIVE TYPE: ICD-10-CM

## 2021-04-30 ENCOUNTER — TELEMEDICINE (OUTPATIENT)
Dept: BEHAVIORAL/MENTAL HEALTH CLINIC | Facility: CLINIC | Age: 26
End: 2021-04-30
Payer: COMMERCIAL

## 2021-04-30 DIAGNOSIS — F31.12 BIPOLAR 1 DISORDER WITH MODERATE MANIA (HCC): Primary | ICD-10-CM

## 2021-04-30 DIAGNOSIS — F41.1 GENERALIZED ANXIETY DISORDER: Chronic | ICD-10-CM

## 2021-04-30 DIAGNOSIS — F43.10 POSTTRAUMATIC STRESS DISORDER: Chronic | ICD-10-CM

## 2021-04-30 PROCEDURE — 90832 PSYTX W PT 30 MINUTES: CPT | Performed by: SOCIAL WORKER

## 2021-04-30 NOTE — PSYCH
This note was not shared with the patient due to this is a psychotherapy note    Virtual Regular Visit      Assessment/Plan:    Problem List Items Addressed This Visit        Other    Posttraumatic stress disorder (Chronic)    Generalized anxiety disorder (Chronic)    Bipolar 1 disorder with moderate willard ([facility] [address] ) - Primary          Encounter provider Emil Pérez    Provider located at [address] [place] [address]  [address]  [place] [address]  [phone]      Recent Visits  Date Type Provider Dept   04/23/21 [address] [place] [address] [facility] [place]   Showing recent visits within past 7 days and meeting all other requirements     Today's Visits  Date Type Provider Dept   04/30/21 [address] [place] [address] [facility] [place]   Showing today's visits and meeting all other requirements     Future Appointments  No visits were found meeting these conditions  Showing future appointments within next 150 days and meeting all other requirements        The patient was identified by name and date of birth  Ines Radhaestuardo was informed that this is a telemedicine visit and that the visit is being conducted through [email or web address] and patient was informed that this is a secure, HIPAA-compliant platform  She agrees to proceed     My office door was closed  No one else was in the room  She acknowledged consent and understanding of privacy and security of the video platform  The patient has agreed to participate and understands they can discontinue the visit at any time  Patient is aware this is a billable service  Psychotherapy Provided: Individual Psychotherapy 30 minutes     Length of time in session: 30 minutes, follow up in 1 week    Encounter Diagnosis     ICD-10-CM    1  Bipolar 1 disorder with moderate willard ([facility] [address] )  F31 12    2   Generalized anxiety disorder F41 1    3  Posttraumatic stress disorder  F43 10        Goals addressed in session: Goal 1 and Goal 2     Data: Eran Osuna reported that she implemented some of the suggestions made by Writer last session such as picking up after herself and packing lunches ahead of time  Eran Osuna reported that she was able to identify benefits of each such as feeling productive, feeling less overwhelmed and making better choices  Writer processed this with Eran Osuna went on to discuss stress related to school and finals  Writer actively listened and Eran Osuna shared and assisted with processing thoughts and feelings  Writer encouraged continued effort from last week to assist with decreasing stress and encouraged Eran Osuna to utilize healthy coping skills as she completes her finals next week  Assessment: Eran Osuna presented in a neutral mood with congruent affect  Eran Osuna was oriented X3, cooperative and openly engaged throughout session  Insight and judgment were observed to be good  Eran Osuna expresses healthy self-awareness and continues to display and verbalize motivation toward treatment  No report of increase in anxiety or depressive symptoms  SI/HI not reported or observed during session  Plan: Continue with weekly individual psychotherapy  Current suicide risk : Low       Behavioral Health Treatment Plan [facility]: Diagnosis and Treatment Plan explained to Alla d'[place] relates understanding diagnosis and is agreeable to Treatment Plan  Yes     I spent 30 minutes directly with the patient during this visit      [address] acknowledges that she has consented to an online visit or consultation  She understands that the online visit is based solely on information provided by her, and that, in the absence of a face-to-face physical evaluation by the physician, the diagnosis she receives is both limited and provisional in terms of accuracy and completeness   This is not intended to replace a full medical face-to-face evaluation by the physician  Sheng Black understands and accepts these terms

## 2021-05-01 RX ORDER — DEXTROAMPHETAMINE SACCHARATE, AMPHETAMINE ASPARTATE MONOHYDRATE, DEXTROAMPHETAMINE SULFATE AND AMPHETAMINE SULFATE 2.5; 2.5; 2.5; 2.5 MG/1; MG/1; MG/1; MG/1
10 CAPSULE, EXTENDED RELEASE ORAL EVERY MORNING
Qty: 30 CAPSULE | Refills: 0 | Status: SHIPPED | OUTPATIENT
Start: 2021-05-03 | End: 2021-06-03 | Stop reason: SDUPTHER

## 2021-05-14 ENCOUNTER — TELEMEDICINE (OUTPATIENT)
Dept: BEHAVIORAL/MENTAL HEALTH CLINIC | Facility: CLINIC | Age: 26
End: 2021-05-14
Payer: COMMERCIAL

## 2021-05-14 DIAGNOSIS — F31.12 BIPOLAR 1 DISORDER WITH MODERATE MANIA (HCC): Primary | ICD-10-CM

## 2021-05-14 DIAGNOSIS — F41.1 GENERALIZED ANXIETY DISORDER: Chronic | ICD-10-CM

## 2021-05-14 DIAGNOSIS — F43.10 POSTTRAUMATIC STRESS DISORDER: Chronic | ICD-10-CM

## 2021-05-14 PROCEDURE — 90834 PSYTX W PT 45 MINUTES: CPT | Performed by: SOCIAL WORKER

## 2021-05-14 NOTE — PSYCH
This note was not shared with the patient due to this is a psychotherapy note    Virtual Regular Visit      Assessment/Plan:    Problem List Items Addressed This Visit        Other    Posttraumatic stress disorder (Chronic)    Generalized anxiety disorder (Chronic)    Bipolar 1 disorder with moderate willard ([facility] [address] ) - Primary        Encounter provider Edel Mcmahon    Provider located at [address]  [address]  [place] [address]  [phone]      Recent Visits  No visits were found meeting these conditions  Showing recent visits within past 7 days and meeting all other requirements     Today's Visits  Date Type Provider Dept   05/14/21 Telemedicine Edel Mcmahon [facility] [place]   Showing today's visits and meeting all other requirements     Future Appointments  No visits were found meeting these conditions  Showing future appointments within next 150 days and meeting all other requirements        The patient was identified by name and date of birth  Adi Gabriela was informed that this is a telemedicine visit and that the visit is being conducted through [address] [place] [address] [employer] and patient was informed that this is a secure, HIPAA-compliant platform  She agrees to proceed     My office door was closed  No one else was in the room  She acknowledged consent and understanding of privacy and security of the video platform  The patient has agreed to participate and understands they can discontinue the visit at any time  Patient is aware this is a billable service  Psychotherapy Provided: Individual Psychotherapy 45 minutes     Length of time in session: 45 minutes, follow up in 1 week    Encounter Diagnosis     ICD-10-CM    1  Bipolar 1 disorder with moderate willard ([facility] [address] )  F31 12    2  Generalized anxiety disorder  F41 1    3   Posttraumatic stress disorder  F43 10        Goals addressed in session: Goal 1     Data: Raimundo Vazquez reported that she saw her orthopedic doctor and he suggested surgery  Raimundo Vazquez reported that she declined and chose to go the path of physical therapy and injections  Raimundo Vazquez reported that she completed her semester at school which she feels positive about, but that it has been a challenging week because her boyfriend expressed some feelings of unhappiness, she experienced loss at her job and the news from her doctor  Writer acknowledged the challenging nature of these stressors happening within days of each other  Writer spent time exploring and processing Guevara's thoughts and feelings  Raimundo Vazquez reported that she has been trying to maintain a positive outlook as she has a month off before her next school semester begins and she would like to be able to enjoy herself  Writer agreed and discussed the value of self-care during this time  Assessment: Raimundo Vazquez presented in a neutral mood  Raimundo Vazquez was oriented X3, cooperative and open throughout session  Raimundo Vazquez offers good insight and judgement  Writer observes some anxiety causing distorted thoughts; however, Raimundo Vazquez expresses motivation to maintain a positive attitude and is self-aware regarding negative thinking patterns  SI/HI not reported or observed  Plan: Self-care  Continue with weekly individual psychotherapy  Current suicide risk : Low     Behavioral Health Treatment Plan [facility]: Diagnosis and Treatment Plan explained to Alla d'[place] relates understanding diagnosis and is agreeable to Treatment Plan  Yes     I spent 45 minutes directly with the patient during this visit      [address] acknowledges that she has consented to an online visit or consultation   She understands that the online visit is based solely on information provided by her, and that, in the absence of a face-to-face physical evaluation by the physician, the diagnosis she receives is both limited and provisional in terms of accuracy and completeness  This is not intended to replace a full medical face-to-face evaluation by the physician  Jonathon Moody understands and accepts these terms

## 2021-05-21 ENCOUNTER — TELEMEDICINE (OUTPATIENT)
Dept: BEHAVIORAL/MENTAL HEALTH CLINIC | Facility: CLINIC | Age: 26
End: 2021-05-21
Payer: COMMERCIAL

## 2021-05-21 DIAGNOSIS — F31.12 BIPOLAR 1 DISORDER WITH MODERATE MANIA (HCC): ICD-10-CM

## 2021-05-21 DIAGNOSIS — F41.1 GENERALIZED ANXIETY DISORDER: Primary | Chronic | ICD-10-CM

## 2021-05-21 PROCEDURE — 90834 PSYTX W PT 45 MINUTES: CPT | Performed by: SOCIAL WORKER

## 2021-05-21 NOTE — PSYCH
This note was not shared with the patient due to this is a psychotherapy note    Virtual Regular Visit      Assessment/Plan:    Problem List Items Addressed This Visit        Other    Generalized anxiety disorder - Primary (Chronic)    Bipolar 1 disorder with moderate willard ([facility] [address] )          Encounter provider Susannah Chandler    Provider located at [address] [place] [address]  [address]  [place] [address]  [phone]      Recent Visits  Date Type Provider Dept   05/14/21 [address] [place] [address] [facility] [place]   Showing recent visits within past 7 days and meeting all other requirements     Future Appointments  No visits were found meeting these conditions  Showing future appointments within next 150 days and meeting all other requirements        The patient was identified by name and date of birth  China Hubbard was informed that this is a telemedicine visit and that the visit is being conducted through [address] [employer] and patient was informed that this is a secure, HIPAA-compliant platform  She agrees to proceed     My office door was closed  No one else was in the room  She acknowledged consent and understanding of privacy and security of the video platform  The patient has agreed to participate and understands they can discontinue the visit at any time  Patient is aware this is a billable service  Psychotherapy Provided: Individual Psychotherapy 45 minutes     Length of time in session: 45 minutes, follow up in 1 week    Encounter Diagnosis     ICD-10-CM    1  Generalized anxiety disorder  F41 1    2  Bipolar 1 disorder with moderate willard ([facility] [address] )  F31 12        Goals addressed in session: Goal 1     Data: Faith Morillo reported that she has been trying her best to enjoy the week as she will be starting her next semester at school soon   Faith Morillo reported that her boyfriend is away on vacation which has been causing some anxiety as he told her he was unsure about their relationship recently  Olivia Ledesma reported that she spent time with him prior to him leaving for vacation, and that it went well, but that they did not discuss anything specific  Olivia Ledesma spent time discussing her emotions  Olivia Ledesma reported that she has been trying to keep busy by spending time with friends, going for walks and other self-care efforts  Olivia Ledesma discussed wanting to communicate her thoughts and feelings with her boyfriend, but waiting until he returns from vacation to be able to have a healthy communication  Writer acknowledged the positive nature of Guevara's use of coping skills and self-awareness regarding her emotions  Writer actively listened throughout session and assisted by exploring and processing thoughts and feelings  Writer encouraged self-care  Assessment: Olivia Ledesma presented in a neutral mood with congruent affect  Olivia Ledesma was oriented X3, open and engaged throughout session  Olivia Ledesma identifies feelings of anxiety related to the "unknown " Olivia Ledesma is actively utilizing healthy coping skills to assist with decreasing anxiety  Some mild symptoms of depression are present such as low self-esteem and depressed mood at times  Insight and judgement are observed to be good  SI/HI not reported or observed  Plan: Continue with individual psychotherapy  Current suicide risk : Low     Behavioral Health Treatment Plan [facility]: Diagnosis and Treatment Plan explained to Alla d'[place] relates understanding diagnosis and is agreeable to Treatment Plan  Yes     I spent 45 minutes directly with the patient during this visit      [address] acknowledges that she has consented to an online visit or consultation   She understands that the online visit is based solely on information provided by her, and that, in the absence of a face-to-face physical evaluation by the physician, the diagnosis she receives is both limited and provisional in terms of accuracy and completeness  This is not intended to replace a full medical face-to-face evaluation by the physician  Nba Bennett understands and accepts these terms

## 2021-06-03 DIAGNOSIS — F90.0 ATTENTION DEFICIT HYPERACTIVITY DISORDER, INATTENTIVE TYPE: ICD-10-CM

## 2021-06-07 RX ORDER — DEXTROAMPHETAMINE SACCHARATE, AMPHETAMINE ASPARTATE MONOHYDRATE, DEXTROAMPHETAMINE SULFATE AND AMPHETAMINE SULFATE 2.5; 2.5; 2.5; 2.5 MG/1; MG/1; MG/1; MG/1
10 CAPSULE, EXTENDED RELEASE ORAL EVERY MORNING
Qty: 30 CAPSULE | Refills: 0 | Status: SHIPPED | OUTPATIENT
Start: 2021-06-07 | End: 2021-07-08 | Stop reason: SDUPTHER

## 2021-06-11 PROBLEM — Z53.29 FAILURE TO ATTEND APPOINTMENT: Status: ACTIVE | Noted: 2021-06-11

## 2021-06-11 PROBLEM — Z91.199 FAILURE TO ATTEND APPOINTMENT: Status: ACTIVE | Noted: 2021-06-11

## 2021-06-11 PROBLEM — F43.10 POSTTRAUMATIC STRESS DISORDER: Chronic | Status: RESOLVED | Noted: 2020-07-24 | Resolved: 2021-06-11

## 2021-06-18 ENCOUNTER — TELEMEDICINE (OUTPATIENT)
Dept: BEHAVIORAL/MENTAL HEALTH CLINIC | Facility: CLINIC | Age: 26
End: 2021-06-18
Payer: COMMERCIAL

## 2021-06-18 DIAGNOSIS — F31.12 BIPOLAR 1 DISORDER WITH MODERATE MANIA (HCC): Primary | ICD-10-CM

## 2021-06-18 PROCEDURE — 90834 PSYTX W PT 45 MINUTES: CPT | Performed by: SOCIAL WORKER

## 2021-06-18 NOTE — PSYCH
This note was not shared with the patient due to this is a psychotherapy note    Virtual Regular Visit      Assessment/Plan:    Problem List Items Addressed This Visit        Other    Bipolar 1 disorder with moderate willard ([facility] ) - Primary             Reason for visit is   Chief Complaint   Patient presents with    Virtual Regular Visit        Encounter provider Emil Pérez    Provider located at [address]  [address]  [place] [address]  [phone]      Recent Visits  No visits were found meeting these conditions  Showing recent visits within past 7 days and meeting all other requirements  Future Appointments  No visits were found meeting these conditions  Showing future appointments within next 150 days and meeting all other requirements       The patient was identified by name and date of birth  Ines Radhaestuardo was informed that this is a telemedicine visit and that the visit is being conducted through [address] Now and patient was informed that this is a secure, HIPAA-compliant platform  She agrees to proceed     My office door was closed  No one else was in the room  She acknowledged consent and understanding of privacy and security of the video platform  The patient has agreed to participate and understands they can discontinue the visit at any time  Patient is aware this is a billable service  Psychotherapy Provided: Individual Psychotherapy 45 minutes     Length of time in session: 45 minutes, follow up in 1 week    Encounter Diagnosis     ICD-10-CM    1  Bipolar 1 disorder with moderate willard ([facility] )  F31 12        Goals addressed in session: Goal 1 and Goal 2     Data: Yue Jessenia reported that her boyfriend broke up with her when he returned from his vacation  Yue Ruelas reported that it has been challenging for her as it is not what she wanted   Yue Jessenia reported that she has been dating over the past few weeks which has not been fantastic  Moraima Vergara discussed this in detail  Moraima Vergara reported feeling less motivation for school as her priorities have shifted since the break up with her boyfriend  Moraima Vergara reported finding it challenging to balance her desire for a social life with school and work  Moraima Vergara reported that she would also like to implement more self-care  Moraima Vergara reported that she lost 10 pounds as a result of her break up and would like to keep the weight off  Moraima Vergara discussed various self-care efforts including exercise and doctors appointments  Moraima Vergara reported that she will be getting a steroid injection for pain in her back and that she learned this could cause a manic episode  Moraima Vergara discussed signs to look out for and agreed that she would contact the office if this were to happen  Moraima Vergara also discussed upcoming breast augmentation surgery that she has finally scheduled after spending years considering  Moraima Vergara discussed how this will improve her quality of life and self-regard, as well as the amount of research she put into the decision  Assessment: Moraima Vergara presented in a neutral mood with congruent affect  Moraima Vergara was well engaged and oriented X3  Moraima Vergara has been experiencing increased stress and heightened emotions due to her recent break up with her boyfriend  Moraima Vergara offered healthy insight and self-awareness regarding her emotional state  Moraima Vergara is motivated to balance life as she adjusts to her new independence  SI/HI not reported or observed during session  Plan: Continue with individual psychotherapy  Current suicide risk : Low     Behavioral Health Treatment Plan  [facility]: Diagnosis and Treatment Plan explained to Alla d'[place]vo[place] relates understanding diagnosis and is agreeable to Treatment Plan   Yes     I spent 45 minutes directly with the patient during this visit      VIRTUAL VISIT [facility] acknowledges that she has consented to an online visit or consultation  She understands that the online visit is based solely on information provided by her, and that, in the absence of a face-to-face physical evaluation by the physician, the diagnosis she receives is both limited and provisional in terms of accuracy and completeness  This is not intended to replace a full medical face-to-face evaluation by the physician  Jaylen Khalil understands and accepts these terms

## 2021-06-25 ENCOUNTER — TELEMEDICINE (OUTPATIENT)
Dept: BEHAVIORAL/MENTAL HEALTH CLINIC | Facility: CLINIC | Age: 26
End: 2021-06-25
Payer: COMMERCIAL

## 2021-06-25 DIAGNOSIS — F31.12 BIPOLAR 1 DISORDER WITH MODERATE MANIA (HCC): Primary | ICD-10-CM

## 2021-06-25 DIAGNOSIS — F41.1 GENERALIZED ANXIETY DISORDER: Chronic | ICD-10-CM

## 2021-06-25 PROCEDURE — 90834 PSYTX W PT 45 MINUTES: CPT | Performed by: SOCIAL WORKER

## 2021-06-25 NOTE — PSYCH
This note was not shared with the patient due to this is a psychotherapy note    Virtual Regular Visit      Assessment/Plan:    Problem List Items Addressed This Visit        Other    Generalized anxiety disorder (Chronic)    Bipolar 1 disorder with moderate willard ([facility] ) - Primary          Reason for visit is   Chief Complaint   Patient presents with    Virtual Regular Visit        Encounter provider Melissa Castillo    Provider located at [address] [place] [address]  [address]  [facility]  [phone]      Recent Visits  Date Type Provider Dept   06/18/21 [address] [place] [address] [facility]   Showing recent visits within past 7 days and meeting all other requirements  Future Appointments  No visits were found meeting these conditions  Showing future appointments within next 150 days and meeting all other requirements       The patient was identified by name and date of birth  Russell Cole was informed that this is a telemedicine visit and that the visit is being conducted through [address] Now and patient was informed that this is a secure, HIPAA-compliant platform  She agrees to proceed     My office door was closed  No one else was in the room  She acknowledged consent and understanding of privacy and security of the video platform  The patient has agreed to participate and understands they can discontinue the visit at any time  Patient is aware this is a billable service  Psychotherapy Provided: Individual Psychotherapy 45 minutes     Length of time in session: 45 minutes, follow up in 1 week    Encounter Diagnosis     ICD-10-CM    1  Bipolar 1 disorder with moderate willard ([facility] )  F31 12    2  Generalized anxiety disorder  F41 1        Goals addressed in session: Goal 1     Data: Lizraji Willow reported that she continues to feel significant stress   Tex Daugherty discussed challenges with school, reporting that she currently has a 60 in her most challenging class  Simonejuan Patel reported feeling overwhelmed  Simonejuan Patel reported that she spoke with classmates to learn about other studying methods that could be more helpful  Simone Chirinosing discussed stress about her upcoming procedure, worrying about risks as well as challenges during recovery time  Simone Patel reported that she continues to go on dates, but has not met anyone that she feels fully connected to  Simone Patel reported feeling unbalanced  Writer spent time exploring this with Simone Converse and discussed ways to create more balance   and Simone Patel discussed the ineffective nature of "chaos" and the benefits of prioritizing and creating a schedule  Simone Patel reflected on her past, identifying that there was a time that she was able to do more and push herself harder  Writespike and Simone Patel discussed the likelihood of these instances being willard  Simone Patel agreed to create a schedule for herself over the next week  Assessment: Simone Patel presented in a neutral mood with congruent affect  Simone Chirinosing was well engaged and oriented X3  Simone Patel is currently overwhelmed by responsibilities and emotional challenges  Simonejuan Patel offers good insight regarding the unbalance in her life  Simonejuan Patel reported feeling that she is either high or low and would like to moderate herself  SI/HI not reported or observed during session  Plan: Continue with individual psycho-therapy  Guevara to create a schedule  Current suicide risk : Low     [facility] Plan [facility]: Diagnosis and Treatment Plan explained to Alla d'[place] relates understanding diagnosis and is agreeable to Treatment Plan  Yes     I spent 45 minutes directly with the patient during this visit      [address] acknowledges that she has consented to an online visit or consultation   She understands that the online visit is based solely on information provided by her, and that, in the absence of a face-to-face physical evaluation by the physician, the diagnosis she receives is both limited and provisional in terms of accuracy and completeness  This is not intended to replace a full medical face-to-face evaluation by the physician  Kay Reese understands and accepts these terms

## 2021-06-30 ENCOUNTER — TELEMEDICINE (OUTPATIENT)
Dept: PSYCHIATRY | Facility: CLINIC | Age: 26
End: 2021-06-30
Payer: COMMERCIAL

## 2021-06-30 DIAGNOSIS — F90.0 ATTENTION DEFICIT HYPERACTIVITY DISORDER, INATTENTIVE TYPE: ICD-10-CM

## 2021-06-30 DIAGNOSIS — F31.9 BIPOLAR I DISORDER, IN FULL REMISSION (HCC): Primary | ICD-10-CM

## 2021-06-30 DIAGNOSIS — F41.1 GENERALIZED ANXIETY DISORDER: ICD-10-CM

## 2021-06-30 PROCEDURE — 99214 OFFICE O/P EST MOD 30 MIN: CPT | Performed by: NURSE PRACTITIONER

## 2021-06-30 RX ORDER — ARIPIPRAZOLE 5 MG/1
5 TABLET ORAL DAILY
Qty: 30 TABLET | Refills: 2 | Status: SHIPPED | OUTPATIENT
Start: 2021-06-30 | End: 2021-10-12 | Stop reason: SDUPTHER

## 2021-06-30 NOTE — PSYCH
PROGRESS NOTE        [facility]      Name and Date of Birth:  Edel Riley 27 y o  1995    Date of Visit: 06/30/21    Pt was seen today via [facility] for medication management and psychotherapy with pts consent  Door to office was closed, provider was alone in office  Time spent on psychotherapy: 7 minutes    Treatment modality: medication management; medication education      SUBJECTIVE: Pt says that aripiprazole is definitely working, gets a lot off feedback from other people that it is working, feels like herself, not having that debilitating anxiety when shit hits the fan at work, current amount of anxiety is no more than her usual  Some anxiety stems from fear of having another manic/depressive episode  Still has some breast leakage if she squeezes them, not otherwise  Adderall continues to be effective in increasing focus  Takes alprazolam seldom, does not need refill today  4/5/2021: Pt says that she still has a little bit of breast leaking, specifically if she squeezes her breasts a drop comes out, but breasts are not enlarged anymore  Stopped taking Euel Kudo as discussed, and has been taking aripiprazole, thinks she is doing ok, has been a little depressed for the last two to three weeks and thinks it may be related to her back injury - the injury happened a month ago, she has no idea where it happened, could be at gymnastics could be at work, she had been excited about exercising and losing weight then this happened,m she is having PT and is hopeful that it will work, took [address] and has Rx for [employer] [facility]  Adderall continues to be effective for inattentiveness/concentration  She does not take alprazolam often and does not need a refill of it today  She denies suicidal ideation, intent or plan at present, has no suicidal ideation, intent or plan at present      She denies any auditory hallucinations and visual hallucinations, denies any other delusional thinking, denies any delusional thinking  She denies any side effects from medications    HPI ROS Appetite Changes and Sleep: normal appetite, normal sleep    Review Of Systems:      Constitutional Negative   ENT Negative   Cardiovascular Negative   Respiratory Negative   Gastrointestinal Negative   Genitourinary Negative   Musculoskeletal Negative   Integumentary Negative   Neurological Negative   Endocrine Negative   Other Symptoms Negative and None       Laboratory Results: No results found for this or any previous visit  Substance Abuse History:    Social History     Substance and Sexual Activity   Drug Use Not on file       Family Psychiatric History:     No family history on file  The following portions of the patient's history were reviewed and updated as appropriate: past family history, past medical history, past social history, past surgical history and problem list     Social History     Socioeconomic History    Marital status: Single     Spouse name: Not on file    Number of children: Not on file    Years of education: Not on file    Highest education level: Not on file   Occupational History    Not on file   Tobacco Use    Smoking status: Not on file   Substance and Sexual Activity    Alcohol use: Not on file    Drug use: Not on file    Sexual activity: Not on file   Other Topics Concern    Not on file   Social History Narrative    Not on file     Social Determinants of Health     Financial Resource Strain:     Difficulty of Paying Living Expenses:    Food Insecurity:     Worried About Running Out of Food in the Last Year:     [address] in the Last Year:    Transportation Needs:     Lack of Transportation (Medical):      Lack of Transportation (Non-Medical):    Physical Activity:     Days of Exercise per Week:     Minutes of Exercise per Session:    Stress:     Feeling of Stress :    Social Connections:     Frequency of Communication with Friends and Family:     Frequency of Social Gatherings with Friends and Family:     Attends [employer] Services:     Active Member of Clubs or Organizations:     Attends Club or Organization Meetings:     Marital Status:    Intimate Partner Violence:     Fear of Current or Ex-Partner:     Emotionally Abused:     Physically Abused:     Sexually Abused:      Social History     Social History Narrative    Not on file        Social History    None             OBJECTIVE:     Mental Status Evaluation:    Appearance age appropriate, casually dressed   Behavior pleasant, cooperative   Speech normal volume, normal pitch   Mood good   Affect Mood-congruent   Thought Processes Coherent, organized, goal-directed   Associations intact associations   Thought Content normal   Perceptual Disturbances: none   Abnormal Thoughts  Risk Potential Suicidal ideation - None  Homicidal ideation - None  Potential for aggression - No   Orientation oriented to person, place, time/date and situation   Memory recent and remote memory grossly intact   Cosciousness alert and awake   Attention Span attention span and concentration are age appropriate   Intellect Not formally assessed   Insight age appropriate    Judgement good    Muscle Strength and  Gait muscle strength and tone were normal   Language no difficulty naming common objects   Fund of Knowledge displays adequate knowledge of current events   Pain none   Pain Scale 0     Patient's chart was reviewed for relevant lab reports and recent encounters with other providers  Medications, treatment progress and treatment plan were reviewed with patient  Treatment plan was updated with patient who agreed with updated plan  Assessment/Plan:       Diagnoses and all orders for this visit:    Bipolar I disorder, in full remission ([facility] )  -     ARIPiprazole (ABILIFY) 5 mg tablet;  Take 1 tablet (5 mg total) by mouth daily    Generalized anxiety disorder    Attention deficit hyperactivity disorder, inattentive type          Treatment Recommendations/Precautions:    Continue current medications:    aripiprazole 5 mg tab 1 tab po qAM   Adderall XR 10 mg one cap po qAM   alprazolam 0 25 mg tab 1 tab qd PRN anxiety/panic    Continue to see Ms Sonia Vasquez LCSW for psychotherapy     Risks/Benefits       Risks, Benefits And Possible Side Effects Of Medications:     Risks, benefits, and possible side effects of medications explained to patient and patient verbalizes understanding and agreement for treatment      Controlled Medication Discussion:      [facility] website data was reviewed with pt  Discussed with patient the risks of sedation, respiratory depression, impairment of ability to drive and potential for abuse and addiction related to treatment with ALPRAZOLAM  The patient understands risk of treatment with ALPRAZOLAM, agrees to not drive if feeling impaired, to not take it when drinking alcohol, to not request early refills, to take medication as prescribed, and to not share it with others      [facility] website data was reviewed with pt  Discussed with patient the risks of potential for abuse and addiction related to treatment with ADDERALL  The patient understands risk of treatment with ADDERALL, agrees to not request early refills, to take medication as prescribed, and to not share it with others

## 2021-06-30 NOTE — BH TREATMENT PLAN
TREATMENT PLAN         [facility]    Name and Date of Birth:  Charlotte Bonilla 27 y o  1995    Date of Treatment Plan: June 30, 2021    Diagnosis/Diagnoses:    1  Bipolar I disorder, in full remission ([address] )    2  Generalized anxiety disorder    3  Attention deficit hyperactivity disorder, inattentive type         Strengths/Personal Resources for Self-Care: taking medications as prescribed, ability to communicate well, general fund of knowledge, good physical health, good understanding of illness, motivation for treatment      Area/Areas of need (in own words): anxiety symptoms  1          Long Term Goal: continue to work toward improving control of acceptable anxiety level  Target date - 12/30/2021   Person/Persons responsible for completion of goal: shannen Waterman     2          Short Term Objective (s) - How will we reach this goal?:   A  Provider new recommended medication/dosage changes and/or continue medication(s): continue current medications as prescribed Vraylar, Xanax, Adderall  B  Continue to see George Avila LCSW for psychotherapy  C  take medications as prescribed, attend scheduled appts  Target date - 12/30/2021   Person/Persons Responsible for Completion of Goal: shannen Waterman     Progress Towards Goals: continuing treatment     Treatment Modality: medication management every 12 weeks     Review due 120 - 180 days from date of this plan:  12/30/2021  Expected length of service: ongoing treatment  My Physician/PA/NP and I have developed this plan together and I agree to work on the goals and objectives  I understand the treatment goals that were developed for my treatment

## 2021-06-30 NOTE — PATIENT INSTRUCTIONS
Continue current medications:               aripiprazole 5 mg tab 1 tab po qAM              Adderall XR 10 mg one cap po qAM              alprazolam 0 25 mg tab 1 tab qd PRN anxiety/panic     Continue to see Ms Steve Alfredo for psychotherapy

## 2021-07-02 ENCOUNTER — TELEMEDICINE (OUTPATIENT)
Dept: BEHAVIORAL/MENTAL HEALTH CLINIC | Facility: CLINIC | Age: 26
End: 2021-07-02
Payer: COMMERCIAL

## 2021-07-02 DIAGNOSIS — F41.1 GENERALIZED ANXIETY DISORDER: Chronic | ICD-10-CM

## 2021-07-02 DIAGNOSIS — F31.12 BIPOLAR 1 DISORDER WITH MODERATE MANIA (HCC): Primary | ICD-10-CM

## 2021-07-02 PROCEDURE — 90834 PSYTX W PT 45 MINUTES: CPT | Performed by: SOCIAL WORKER

## 2021-07-02 NOTE — PSYCH
This note was not shared with the patient due to this is a psychotherapy note    Virtual Regular Visit      Assessment/Plan:    Problem List Items Addressed This Visit        Other    Generalized anxiety disorder (Chronic)    Bipolar 1 disorder with moderate willard ([facility] ) - Primary            Reason for visit is   Chief Complaint   Patient presents with    Virtual Regular Visit        Encounter provider Natalie Nieves    Provider located at [address] [place] [address]  [address]  [place] [address]  [phone]      Recent Visits  Date Type Provider Dept   06/25/21 [address] [place] [address] [facility] The[facility] [place]   Showing recent visits within past 7 days and meeting all other requirements  Today's Visits  Date Type Provider Dept   07/02/21 [address] [place] [address] [facility] Therap[facility] [place]   Showing today's visits and meeting all other requirements  Future Appointments  No visits were found meeting these conditions  Showing future appointments within next 150 days and meeting all other requirements       The patient was identified by name and date of birth  Jo Garcia was informed that this is a telemedicine visit and that the visit is being conducted through [address] [place] [address] Now and patient was informed that this is a secure, HIPAA-compliant platform  She agrees to proceed     My office door was closed  No one else was in the room  She acknowledged consent and understanding of privacy and security of the video platform  The patient has agreed to participate and understands they can discontinue the visit at any time  Patient is aware this is a billable service  Psychotherapy Provided: Individual Psychotherapy 45 minutes     Length of time in session: 45 minutes, follow up in 1 week    Encounter Diagnosis     ICD-10-CM    1   Bipolar 1 disorder with moderate willard ([facility] ) F31 12    2  Generalized anxiety disorder  F41 1        Goals addressed in session: Goal 1     Data: Yaritza Rodriges reported that she got a 90 on a test in her hardest class which was a great success; however, she believes that the upcoming mid-term will be harder  Yaritza Rodriges discussed school, reporting that she has been studying more and is trying her best to get through these challenging classes  Yaritza Rodriges reported that she has not been wanting to talk about school much because it is such a source of stress  Yaritza Rodriges went on to report that work has been going well  Yaritza Rodriges agreed that the positive nature of her job helps to compensate for the stress from school  Yaritza Rodriges also discussed social relationships and dating  Writer actively listened as Yaritza Rodriges shared and assisted by exploring and processing thoughts and feelings  Writer identified strengths and successes for Yaritza Rodriges throughout session to help emphasize the positives in her life  Assessment: Yaritza Rodriges presented in a neutral mood with congruent affect  Yaritza Rodriges was well engaged and oriented X3  Stress is elevated for Guevara which contributes to depression and anxiety; however, Yaritza Rodriges has been making progress with self-care, balance and positive self-talk  Insight and judgement are intact  SI/HI not reported or observed  Plan: Continue with individual psycho-therapy  Current suicide risk : Low     [facility]: Diagnosis and Treatment Plan explained to Alla d'[place] relates understanding diagnosis and is agreeable to Treatment Plan  Yes      I spent 45 minutes directly with the patient during this visit      [address] acknowledges that she has consented to an online visit or consultation   She understands that the online visit is based solely on information provided by her, and that, in the absence of a face-to-face physical evaluation by the physician, the diagnosis she receives is both limited and provisional in terms of accuracy and completeness  This is not intended to replace a full medical face-to-face evaluation by the physician  Breanne Leo understands and accepts these terms

## 2021-07-08 DIAGNOSIS — F90.0 ATTENTION DEFICIT HYPERACTIVITY DISORDER, INATTENTIVE TYPE: ICD-10-CM

## 2021-07-08 RX ORDER — DEXTROAMPHETAMINE SACCHARATE, AMPHETAMINE ASPARTATE MONOHYDRATE, DEXTROAMPHETAMINE SULFATE AND AMPHETAMINE SULFATE 2.5; 2.5; 2.5; 2.5 MG/1; MG/1; MG/1; MG/1
10 CAPSULE, EXTENDED RELEASE ORAL EVERY MORNING
Qty: 30 CAPSULE | Refills: 0 | Status: SHIPPED | OUTPATIENT
Start: 2021-07-08 | End: 2021-08-05 | Stop reason: SDUPTHER

## 2021-07-08 NOTE — TELEPHONE ENCOUNTER
----- Message from Holly Muse sent at 7/8/2021  9:39 AM EDT -----  Regarding: Refill  Adderall xr 10mg  [facility]  Last appt 6/30casey

## 2021-07-09 ENCOUNTER — TELEMEDICINE (OUTPATIENT)
Dept: BEHAVIORAL/MENTAL HEALTH CLINIC | Facility: CLINIC | Age: 26
End: 2021-07-09
Payer: COMMERCIAL

## 2021-07-09 DIAGNOSIS — F31.12 BIPOLAR 1 DISORDER WITH MODERATE MANIA (HCC): Primary | ICD-10-CM

## 2021-07-09 DIAGNOSIS — F41.1 GENERALIZED ANXIETY DISORDER: Chronic | ICD-10-CM

## 2021-07-09 PROCEDURE — 90834 PSYTX W PT 45 MINUTES: CPT | Performed by: SOCIAL WORKER

## 2021-07-09 NOTE — PSYCH
This note was not shared with the patient due to this is a psychotherapy note    Virtual Regular Visit      Assessment/Plan:    Problem List Items Addressed This Visit        Other    Generalized anxiety disorder (Chronic)    Bipolar 1 disorder with moderate willard ([facility] [address] ) - Primary             Reason for visit is   Chief Complaint   Patient presents with    Virtual Regular Visit        Encounter provider Macario Yadav    Provider located at [address]  [address]  [place] [address]  [phone]      Recent Visits  Date Type Provider Dept   07/02/21 [address] [place] [address] [facility]   Showing recent visits within past 7 days and meeting all other requirements  Future Appointments  No visits were found meeting these conditions  Showing future appointments within next 150 days and meeting all other requirements       The patient was identified by name and date of birth  Cassandra Downs was informed that this is a telemedicine visit and that the visit is being conducted through [address] [place] [address] [facility] and patient was informed that this is a secure, HIPAA-compliant platform  She agrees to proceed     My office door was closed  No one else was in the room  She acknowledged consent and understanding of privacy and security of the video platform  The patient has agreed to participate and understands they can discontinue the visit at any time  Patient is aware this is a billable service  Psychotherapy Provided: Individual Psychotherapy 45 minutes     Length of time in session: 45 minutes, follow up in 1 week    Encounter Diagnosis     ICD-10-CM    1  Bipolar 1 disorder with moderate willard ([facility] [address] )  F31 12    2   Generalized anxiety disorder  F41 1        Goals addressed in session: Goal 1     Data: Taj Lomeli reported that she did well on one of her mid-terms, but got a 60 on the other  Rocio Alvarado reported that fortunately, she is able to re-take the one she did not do well on as there were connection issues during the test  Hectoralfonzo Alvarado discussed thoughts and feelings related to school, identifying frustration and exhaustion  Hectoralfonzo Alvarado reported that she will be having breast augmentation surgery this coming Tuesday  Hectorpurvifátima Alvarado reported feeling excited to have this procedure done, but also slightly nervous  Hectorpurvifátima Jenny reported that her mother will be coming to spend the week with her to help while Hectoralfonzo Alvarado recovers  Hectoralfonzo Alvarado went on to discuss dating and feelings of hurt and frustration toward her ex-boyfriend  Writer actively listened and assisted by processing thoughts and feelings  Writer and Rocio Alvarado discussed focusing on the present, as well as positive  Assessment: Hectorpurvifátima Jenny presented in a neutral mood with congruent affect  Rocio Jenny was well engaged and oriented X3  Rocio Alvarado continues to identify stress related to school, but is balancing well with use of her support system and prioritizing her school work and responsibilities  Rocio Jenny denies any symptoms of willard; some depression present at times  Insight and judgement are intact  SI/HI not reported or observed  Plan: Continue with individual psycho-therapy  Current suicide risk : Low       Behavioral Health Treatment Plan [facility]: Diagnosis and Treatment Plan explained to Alla d'[place] relates understanding diagnosis and is agreeable to Treatment Plan  Yes     I spent 45 minutes directly with the patient during this visit      [address] acknowledges that she has consented to an online visit or consultation  She understands that the online visit is based solely on information provided by her, and that, in the absence of a face-to-face physical evaluation by the physician, the diagnosis she receives is both limited and provisional in terms of accuracy and completeness   This is not intended to replace a full medical face-to-face evaluation by the physician  Raymundo Hart understands and accepts these terms

## 2021-07-09 NOTE — BH TREATMENT PLAN
Kim Gomes  1995         Date of Initial Treatment Plan: First [place] plan with new Therapist 4/9/2021      Date of Current Treatment Plan: 7/9/21     Treatment Plan Number 2      Strengths/Personal Resources for Self Care: Persistent, "I have a big heart," motivated toward treatment            Diagnosis:   1  Generalized anxiety disorder      2  Posttraumatic stress disorder      3  Bipolar 1 disorder with moderate willard ([address] )            Area of Needs: adjusting to diagnosis of Bipolar, self-awareness, mood regulation        Long Term Goal 1: Adiel Burks will increase self-awareness regarding symptoms of Bipolar Disorder      Target Date: 10/9/2021  Completion Date: N/A         Short Term Objectives for Goal 1:               Serene Edmondson will explore and process her experience and symptoms of Bipolar Disorder during session  (completed, 7/9/21)              Serene Edmondson will receive psychoeducation regarding Bipolar Disorder to assist with understanding of symptoms  (continue, 7/9/21)               Serene Edmondson will process thoughts and feelings regarding her diagnosis of Bipolar Disorder during session  (continue, 7/9/21)     Long Term Goal 2: Adiel Burks will utilize healthy coping skills to assist with regulating mood      Target Date: 10/9/2021  Completion Date: N/A     Short Term Objectives for Goal 2:           A  Adiel Burks will identify current use of healthy coping skills  (completed, 7/9/21)          B  Adiel Burks will receive psycho-education regarding skills to help regulate mood  (continue, 7/9/21)          C  Adiel Burks will implement learned skills and process barriers and/or successes to regulating mood   (continue, 7/9/21)     GOAL 1: Modality: Individual 4x per month   Completion Date 10/9/2021, Medication Management and The person(s) responsible for carrying out the plan is  NAZIA Waterman, Therapist      GOAL 2: Modality: Individual 4x per month   Completion Date 10/9/2021, Medication Management and The person(s) responsible for carrying out the plan is  NAZIA Cole, Therapist               Behavioral Health Treatment Plan [facility]: Diagnosis and Treatment Plan explained to Alla d'[place] relates understanding diagnosis and is agreeable to Treatment Plan          Client Comments : Please share your thoughts, feelings, need and/or experiences regarding your treatment plan: "Yes, I agree "    Treatment Plan done but not signed at time of office visit due to:  Plan reviewed by phone or in person  and verbal consent given due to Nemesio social kendell

## 2021-07-16 ENCOUNTER — TELEMEDICINE (OUTPATIENT)
Dept: BEHAVIORAL/MENTAL HEALTH CLINIC | Facility: CLINIC | Age: 26
End: 2021-07-16
Payer: COMMERCIAL

## 2021-07-16 DIAGNOSIS — F41.1 GENERALIZED ANXIETY DISORDER: Chronic | ICD-10-CM

## 2021-07-16 DIAGNOSIS — F31.12 BIPOLAR 1 DISORDER WITH MODERATE MANIA (HCC): Primary | ICD-10-CM

## 2021-07-16 PROCEDURE — 90834 PSYTX W PT 45 MINUTES: CPT | Performed by: SOCIAL WORKER

## 2021-07-16 NOTE — PSYCH
This note was not shared with the patient due to this is a psychotherapy note     Virtual Regular Visit    Verification of patient location:    Patient is currently located in the state [facility]  Patient is currently located in a state in which I am licensed      Assessment/Plan:    Problem List Items Addressed This Visit        Other    Generalized anxiety disorder (Chronic)    Bipolar 1 disorder with moderate willard ([facility] [address] ) - Primary          Goals addressed in session: Goal 1 and Goal 2          Reason for visit is   Chief Complaint   Patient presents with    Virtual Regular Visit        Encounter provider Emilie Del Castillo    Provider located at [address] [place] [address]  [address]  [place] [address]  [phone]      Recent Visits  Date Type Provider Dept   07/09/21 [address] [place] [address] [facility] [place]   Showing recent visits within past 7 days and meeting all other requirements  Today's Visits  Date Type Provider Dept   07/16/21 [address] [place] [address] [facility] [place]   Showing today's visits and meeting all other requirements  Future Appointments  No visits were found meeting these conditions  Showing future appointments within next 150 days and meeting all other requirements       The patient was identified by name and date of birth  Kleber Kim was informed that this is a telemedicine visit and that the visit is being conducted through[employer] and patient was informed that this is a secure, HIPAA-compliant platform  She agrees to proceed     My office door was closed  No one else was in the room  She acknowledged consent and understanding of privacy and security of the video platform  The patient has agreed to participate and understands they can discontinue the visit at any time  Patient is aware this is a billable service  Psychotherapy Provided: Individual Psychotherapy 45 minutes     Length of time in session: 45 minutes, follow up in 1 week    Encounter Diagnosis     ICD-10-CM    1  Bipolar 1 disorder with moderate willard ([facility] [address] )  F31 12    2  Generalized anxiety disorder  F41 1        Goals addressed in session: Goal 1 and Goal 2     Data: Araceli Chilel reported that she took her midterm this morning and passed with a higher score than the first time she took it  Joaquínrinoemi expressed relief  Johnkenroy Chilel reported that she did have breast augmentation surgery yesterday and that the procedure went well  Johnkenroy Chilel reported feeling in less pain than she anticipated  Araceli Chilel discussed her recovery over the next several weeks  Padminiwilmar Chilel reported that she plans to spend more time studying as she is not allowed to engage in much physical activity while she heals  Araceli Getachew went on to discuss school and social relationships  Johnkenroy Chilel discussed dating, reporting that she has been having a positive experience with dating overall  Johnkenroy Getachew discussed this in detail  Writer actively listened as Araceli Chilel shared throughout session and assisted by exploring and processing thoughts and feelings  Writer emotionally validated Araceli Chilel as needed throughout session  Writer reviewed treatment plan with Araceli Getachew and received verbal consent for the update  Assessment: Araceli Chilel presented in a neutral mood with congruent affect  Padminiwilmar Chilel was well engaged and oriented X3  Padminiwilmar Chilel reports no concerns with current mood/anxiety  Padminiwilmar Chilel is actively utilizing healthy coping skills and self-care  Thought contact is normal  No concerns of impulsivity  Insight and judgement are intact  SI/HI not reported or observed during sessions  Plan: Continue with individual psycho-therapy       Current suicide risk : Low     Behavioral Health Treatment Plan [facility]: Diagnosis and Treatment Plan explained to Alla d'Eliezeroirradha relates understanding diagnosis and is agreeable to Treatment Plan  Yes       I spent 45 minutes directly with the patient during this visit    VIRTUAL VISIT [address] verbally agrees to participate in [employer]  Pt is aware that [employer] could be limited without vital signs or the ability to perform a full hands-on physical [employer] understands she or the provider may request at any time to terminate the video visit and request the patient to seek care or treatment in person

## 2021-07-16 NOTE — BH TREATMENT PLAN
Osei Springer  1995         Date of Initial Treatment Plan: First TX plan with new Therapist 4/9/2021      Date of Current Treatment Plan: 7/16/21     Treatment Plan Number 2     Strengths/Personal Resources for Self Care: Persistent, "I have a big heart," motivated toward treatment            Diagnosis:   1  Generalized anxiety disorder      2  Posttraumatic stress disorder      3  Bipolar 1 disorder with moderate willard (HCC)            Area of Needs: adjusting to diagnosis of Bipolar, self-awareness, mood regulation        Long Term Goal 1: Meriwether Leyda will increase self-awareness regarding symptoms of Bipolar Disorder      Target Date: 10/16/2021  Completion Date: N/A         Short Term Objectives for Goal 1:               Marysol Trevino will explore and process her experience and symptoms of Bipolar Disorder during session  Marysol Trevino will receive psychoeducation regarding Bipolar Disorder to assist with understanding of symptoms  Marysol Trevino will process thoughts adn feelings regarding her diagnosis of Bipolar Disorder during session       Long Term Goal 2: Hermelindo Crabtree will utilize healthy coping skills to assist with regulating mood      Target Date: 10/16/2021  Completion Date: N/A     Short Term Objectives for Goal 2:           A  Hermelindo Friend will identify current use of healthy coping skills  B  Hermelindo Crabtree will receive psycho-education regarding skills to help regulate mood            Marysol Trevino will implement learned skills and process barriers and/or successes to regulating mood      GOAL 1: Modality: Individual 4x per month   Completion Date 10/16/2021, Medication Management and The person(s) responsible for carrying out the plan is  NAZIA Heck, Therapist      GOAL 2: Modality: Individual 4x per month   Completion Date 10/16/2021, Medication Management and The person(s) responsible for carrying out the plan is  NAZIA Heck, Therapist             Behavioral Health Treatment Plan [facility]: Diagnosis and Treatment Plan explained to Alla d'[place] relates understanding diagnosis and is agreeable to Treatment Plan          Client Comments : Please share your thoughts, feelings, need and/or experiences regarding your treatment plan: "Yes, I agree "    Treatment Plan done but not signed at time of office visit due to:  Plan reviewed by phone or in person  and verbal consent given due to Nemesio social distjerry

## 2021-07-23 ENCOUNTER — TELEMEDICINE (OUTPATIENT)
Dept: BEHAVIORAL/MENTAL HEALTH CLINIC | Facility: CLINIC | Age: 26
End: 2021-07-23
Payer: COMMERCIAL

## 2021-07-23 DIAGNOSIS — F31.12 BIPOLAR 1 DISORDER WITH MODERATE MANIA (HCC): Primary | ICD-10-CM

## 2021-07-23 DIAGNOSIS — F41.1 GENERALIZED ANXIETY DISORDER: ICD-10-CM

## 2021-07-23 PROCEDURE — 90834 PSYTX W PT 45 MINUTES: CPT | Performed by: SOCIAL WORKER

## 2021-07-23 NOTE — PSYCH
This note was not shared with the patient due to this is a psychotherapy note    Virtual Regular Visit    Verification of patient location:    Patient is currently located in the [facility]  Patient is currently located in a state in which I am licensed      Assessment/Plan:    Problem List Items Addressed This Visit        Other    Generalized anxiety disorder (Chronic)    Bipolar 1 disorder with moderate willard ([facility] [address] ) - Primary          Goals addressed in session: Goal 1          Reason for visit is   Chief Complaint   Patient presents with    Virtual Regular Visit        Encounter provider Marcelle Chirinos    Provider located at [address] [place] [address]  [address]  [facility]  [phone]      Recent Visits  Date Type Provider Dept   07/16/21 [address] [place] [address] [facility] Therapist [facility]   Showing recent visits within past 7 days and meeting all other requirements  Future Appointments  No visits were found meeting these conditions  Showing future appointments within next 150 days and meeting all other requirements       The patient was identified by name and date of birth  Lane Walker was informed that this is a telemedicine visit and that [facility] and patient was informed that this is a secure, HIPAA-compliant platform  She agrees to proceed     My office door was closed  No one else was in the room  She acknowledged consent and understanding of privacy and security of the video platform  The patient has agreed to participate and understands they can discontinue the visit at any time  Patient is aware this is a billable service  Psychotherapy Provided: Individual Psychotherapy 45 minutes     Length of time in session: 45 minutes, follow up in 1 week    Encounter Diagnosis     ICD-10-CM    1   Bipolar 1 disorder with moderate willard ([facility] )  F31 12    2  Generalized anxiety disorder  F41 1        Goals addressed in session: Goal 1     Data: Nichole Martinez reported that she did not do well on another quiz in her psycho-pharmacology class this week and now has to make a decision about how she wants to move forward with this course  Nichole Martinez reported that she has a meeting scheduled with the jovanna and advisor to discuss options  Nichole Martinez expressed feelings of disappointment that she is struggling, but also potential relief if she were to decide to drop the class  Writer spent session exploring Guevara's thoughts and feelings, assisting with the decision making process  Nichole Martinez discussed questions she plans to ask during her meeting and considered various outcomes  Nichole Younglles recognized strengths and weaknesses while discussing how to potentially move forward  Writer encouraged Nichole Martinez to create a pros and cons list prior to making her decision  Assessment: Nichole Martinez presented in a stressed mood with congruent affect  Nichole Younglles was well engaged and oriented X3  Nichole Martinez utilized session time well, exploring her thoughts and feelings regarding a large decision that she will have to make about school  Insight and judgement are intact  SI/HI not reported or observed  Plan: Continue with individual psycho-therapy  Current suicide risk : Low     Behavioral Health Treatment Plan [facility]: Diagnosis and Treatment Plan explained to Alla d'[place] relates understanding diagnosis and is agreeable to Treatment Plan  Yes     I spent 45 minutes directly with the patient during this visit    VIRTUAL VISIT [address] verbally agrees to participate in [employer]   Pt is aware that [employer] could be limited without vital signs or the ability to perform a full hands-on physical Amberly Mathews understands she or the provider may request at any time to terminate the video visit and request the patient to seek care or treatment in person

## 2021-07-30 ENCOUNTER — TELEMEDICINE (OUTPATIENT)
Dept: BEHAVIORAL/MENTAL HEALTH CLINIC | Facility: CLINIC | Age: 26
End: 2021-07-30
Payer: COMMERCIAL

## 2021-07-30 DIAGNOSIS — F31.12 BIPOLAR 1 DISORDER WITH MODERATE MANIA (HCC): Primary | ICD-10-CM

## 2021-07-30 PROCEDURE — 90834 PSYTX W PT 45 MINUTES: CPT | Performed by: SOCIAL WORKER

## 2021-07-30 NOTE — PSYCH
This note was not shared with the patient due to this is a psychotherapy note    Virtual Regular Visit    Verification of patient location:    Patient is located in the following state in which I hold an active license [place]      Assessment/Plan:    Problem List Items Addressed This Visit        Other    Bipolar 1 disorder with moderate willard ([facility] [address] ) - Primary          Goals addressed in session: Goal 1          Reason for visit is   Chief Complaint   Patient presents with    Virtual Regular Visit        Encounter provider Ken Wagoner    Provider located at [address] [place] [address]  [address]  [place] [address]  [phone]      Recent Visits  Date Type Provider Dept   07/23/21 [address] [place] [address] [facility] Therapist [facility]hon   Showing recent visits within past 7 days and meeting all other requirements  Future Appointments  No visits were found meeting these conditions  Showing future appointments within next 150 days and meeting all other requirements       The patient was identified by name and date of birth  Ajith Wakefield was informed that this is a telemedicine visit and that the visit is being conducted through[employer] and patient was informed that this is a secure, HIPAA-compliant platform  She agrees to proceed     My office door was closed  No one else was in the room  She acknowledged consent and understanding of privacy and security of the video platform  The patient has agreed to participate and understands they can discontinue the visit at any time  Patient is aware this is a billable service  Psychotherapy Provided: Individual Psychotherapy 45 minutes     Length of time in session: 45 minutes, follow up in 2 weeks    Encounter Diagnosis     ICD-10-CM    1   Bipolar 1 disorder with moderate willard ([facility] [address] )  F31 12        Goals addressed in session: Goal 1     Data: [place] reported that she made the decision to drop the class that she had been struggling with  Robles Still discussed meeting with the Caridad Lopez of the program and discussing what her school scheduled would look like moving forward  Robles Still identified some mixed emotions about pushing back her graduation date, but believes that this was the best decision  Robles Still discussed the new downtime without her class and being unsure of what to do during this time  Writer and Robles Still discussed how to utilize the time for self-care which she has been lacking for the past few months  Robles Still went on to discuss some stressors, including her cat being sick and the world of dating  Robles Still became tearful while acknowledging lingering hurt from her recent break up  Writer actively listened as Robles Still shared and assisted by exploring and processing thoughts and feelings  Writer highlighted positives throughout session to display healthier thinking  Assessment: Robles Still presented in a neutral mood with congruent affect  Robles Still was well engaged and oriented X3  Robles Still reports no significant changes in mental status  Robles Still responded well to challenges from Writer to replace negative thoughts with positive thoughts  Insight and judgement are intact  SI/HI not reported or observed  Plan: Continue with individual psycho-therapy  Current suicide risk : Low     Behavioral Health Treatment Plan [facility]: Diagnosis and Treatment Plan explained to Alla d'[place] relates understanding diagnosis and is agreeable to Treatment Plan  Yes     I spent 45 minutes directly with the patient during this visit    VIRTUAL VISIT [address] verbally agrees to participate in [employer]   Pt is aware that [employer] could be limited without vital signs or the ability to perform a full hands-on physical Nilda Ronan understands she or the provider may request at any time to terminate the video visit and request the patient to seek care or treatment in person

## 2021-08-05 DIAGNOSIS — F90.0 ATTENTION DEFICIT HYPERACTIVITY DISORDER, INATTENTIVE TYPE: ICD-10-CM

## 2021-08-05 RX ORDER — DEXTROAMPHETAMINE SACCHARATE, AMPHETAMINE ASPARTATE MONOHYDRATE, DEXTROAMPHETAMINE SULFATE AND AMPHETAMINE SULFATE 2.5; 2.5; 2.5; 2.5 MG/1; MG/1; MG/1; MG/1
10 CAPSULE, EXTENDED RELEASE ORAL EVERY MORNING
Qty: 30 CAPSULE | Refills: 0 | Status: SHIPPED | OUTPATIENT
Start: 2021-08-05 | End: 2021-09-09 | Stop reason: SDUPTHER

## 2021-09-09 DIAGNOSIS — F90.0 ATTENTION DEFICIT HYPERACTIVITY DISORDER, INATTENTIVE TYPE: ICD-10-CM

## 2021-09-09 DIAGNOSIS — F41.1 GENERALIZED ANXIETY DISORDER: ICD-10-CM

## 2021-09-09 RX ORDER — DEXTROAMPHETAMINE SACCHARATE, AMPHETAMINE ASPARTATE MONOHYDRATE, DEXTROAMPHETAMINE SULFATE AND AMPHETAMINE SULFATE 2.5; 2.5; 2.5; 2.5 MG/1; MG/1; MG/1; MG/1
10 CAPSULE, EXTENDED RELEASE ORAL EVERY MORNING
Qty: 30 CAPSULE | Refills: 0 | Status: SHIPPED | OUTPATIENT
Start: 2021-09-09 | End: 2021-10-11

## 2021-09-09 RX ORDER — ALPRAZOLAM 0.25 MG/1
0.25 TABLET ORAL DAILY
Qty: 30 TABLET | Refills: 0 | Status: SHIPPED | OUTPATIENT
Start: 2021-09-09 | End: 2022-06-21 | Stop reason: SDUPTHER

## 2021-09-09 NOTE — TELEPHONE ENCOUNTER
----- Message from Linda Galvin sent at 9/9/2021  8:10 AM EDT -----  Regarding: Refill  Adderall xr 10mg  Xanax   25mg   [facility]  Last appt 6/30, Meka Grace    Please fill for frandy peña

## 2021-09-24 ENCOUNTER — TELEMEDICINE (OUTPATIENT)
Dept: BEHAVIORAL/MENTAL HEALTH CLINIC | Facility: CLINIC | Age: 26
End: 2021-09-24
Payer: COMMERCIAL

## 2021-09-24 DIAGNOSIS — F41.1 GENERALIZED ANXIETY DISORDER: Chronic | ICD-10-CM

## 2021-09-24 DIAGNOSIS — F31.12 BIPOLAR 1 DISORDER WITH MODERATE MANIA (HCC): Primary | ICD-10-CM

## 2021-09-24 PROCEDURE — 90834 PSYTX W PT 45 MINUTES: CPT | Performed by: SOCIAL WORKER

## 2021-09-24 NOTE — PSYCH
This note was not shared with the patient due to this is a psychotherapy note     Virtual Regular Visit    Verification of patient location:    Patient is located in the following state in which I hold an active license [place]      Assessment/Plan:    Problem List Items Addressed This Visit        Other    Generalized anxiety disorder (Chronic)    Bipolar 1 disorder with moderate willard ([facility] [address] ) - Primary          Reason for visit is   Chief Complaint   Patient presents with    Virtual Regular Visit        Encounter provider Edelmira Marquez    Provider located at [address] [place] [address]  [address]  [place] [address]  [phone]      Recent Visits  No visits were found meeting these conditions  Showing recent visits within past 7 days and meeting all other requirements  Today's Visits  Date Type Provider Dept   09/24/21 Telemedicine Edelmira Marquez [facility] Therapist [facility]   Showing today's visits and meeting all other requirements  Future Appointments  No visits were found meeting these conditions  Showing future appointments within next 150 days and meeting all other requirements       The patient was identified by name and date of birth  Sukhjinder Nelson was informed that this is a telemedicine visit and that the visit is being conducted [facility] and patient was informed that this is a secure, HIPAA-compliant platform  She agrees to proceed     My office door was closed  No one else was in the room  She acknowledged consent and understanding of privacy and security of the video platform  The patient has agreed to participate and understands they can discontinue the visit at any time  Patient is aware this is a billable service       Psychotherapy Provided: Individual Psychotherapy 45 minutes     Length of time in session: 45 minutes, follow up in 1 week    Encounter Diagnosis     ICD-10-CM 1  Bipolar 1 disorder with moderate willard ([facility] [address] )  F31 12    2  Generalized anxiety disorder  F41 1        Goals addressed in session: Goal 1     Data: Jorge Gutierrez reported that she has been working a lot of over time recently which is why she has missed appointments over the past few weeks  Jorge Gutierrez reported that while this has been exhausting, it has been helpful for her regarding her bills  Jorge Gutierrez spent some time discussing the past several weeks, including her vacation, work and social life  Jorge Gutierrez reported that she continues to contemplate how she wants to move forward with school as she still has an overall feeling of hesitation and resistance to the intense nature of her current program  Writer spent time actively listening, exploring and processing Guevara's thoughts and feelings  Writer and Jorge Gutierrez discussed exploring options regarding school, self-care and quality of life  Assessment: Jorge Gutierrez presented in a neutral mood with congurent affect  Jorge Gutierrez was well engaged and oriented X4  Mood is reportedly stable  Stress has decreased  Thought contact is normal  Insight and judgement intact  SI/HI not reported or observed  Plan: Continue with individual psychotherapy  Current suicide risk : Low     Behavioral Health Treatment Plan [facility]: Diagnosis and Treatment Plan explained to Alla d'Ivoir relates understanding diagnosis and is agreeable to Treatment Plan  Yes     I spent 45 minutes directly with the patient during this visit      VIRTUAL VISIT [address] verbally agrees to participate in [employer]  Pt is aware that [employer] could be limited without vital signs or the ability to perform a full hands-on physical Pineda Zen understands she or the provider may request at any time to terminate the video visit and request the patient to seek care or treatment in person

## 2021-10-01 ENCOUNTER — TELEMEDICINE (OUTPATIENT)
Dept: BEHAVIORAL/MENTAL HEALTH CLINIC | Facility: CLINIC | Age: 26
End: 2021-10-01
Payer: COMMERCIAL

## 2021-10-01 DIAGNOSIS — F41.1 GENERALIZED ANXIETY DISORDER: Chronic | ICD-10-CM

## 2021-10-01 DIAGNOSIS — F31.12 BIPOLAR 1 DISORDER WITH MODERATE MANIA (HCC): Primary | ICD-10-CM

## 2021-10-01 PROCEDURE — 90834 PSYTX W PT 45 MINUTES: CPT | Performed by: SOCIAL WORKER

## 2021-10-11 DIAGNOSIS — F90.0 ATTENTION DEFICIT HYPERACTIVITY DISORDER, INATTENTIVE TYPE: ICD-10-CM

## 2021-10-11 RX ORDER — DEXTROAMPHETAMINE SACCHARATE, AMPHETAMINE ASPARTATE MONOHYDRATE, DEXTROAMPHETAMINE SULFATE AND AMPHETAMINE SULFATE 2.5; 2.5; 2.5; 2.5 MG/1; MG/1; MG/1; MG/1
CAPSULE, EXTENDED RELEASE ORAL
Qty: 30 CAPSULE | Refills: 0 | Status: SHIPPED | OUTPATIENT
Start: 2021-10-11 | End: 2021-11-10 | Stop reason: SDUPTHER

## 2021-10-12 DIAGNOSIS — F31.9 BIPOLAR I DISORDER, IN FULL REMISSION (HCC): ICD-10-CM

## 2021-10-12 RX ORDER — ARIPIPRAZOLE 5 MG/1
5 TABLET ORAL DAILY
Qty: 30 TABLET | Refills: 2 | Status: SHIPPED | OUTPATIENT
Start: 2021-10-12 | End: 2022-01-10 | Stop reason: SDUPTHER

## 2021-10-15 ENCOUNTER — TELEMEDICINE (OUTPATIENT)
Dept: BEHAVIORAL/MENTAL HEALTH CLINIC | Facility: CLINIC | Age: 26
End: 2021-10-15
Payer: COMMERCIAL

## 2021-10-15 DIAGNOSIS — F31.12 BIPOLAR 1 DISORDER WITH MODERATE MANIA (HCC): Primary | ICD-10-CM

## 2021-10-15 DIAGNOSIS — F41.1 GENERALIZED ANXIETY DISORDER: Chronic | ICD-10-CM

## 2021-10-15 PROCEDURE — 90834 PSYTX W PT 45 MINUTES: CPT | Performed by: SOCIAL WORKER

## 2021-10-27 ENCOUNTER — TELEMEDICINE (OUTPATIENT)
Dept: BEHAVIORAL/MENTAL HEALTH CLINIC | Facility: CLINIC | Age: 26
End: 2021-10-27
Payer: COMMERCIAL

## 2021-10-27 DIAGNOSIS — F31.12 BIPOLAR 1 DISORDER WITH MODERATE MANIA (HCC): Primary | ICD-10-CM

## 2021-10-27 PROCEDURE — 90834 PSYTX W PT 45 MINUTES: CPT | Performed by: SOCIAL WORKER

## 2021-11-10 ENCOUNTER — TELEMEDICINE (OUTPATIENT)
Dept: BEHAVIORAL/MENTAL HEALTH CLINIC | Facility: CLINIC | Age: 26
End: 2021-11-10
Payer: COMMERCIAL

## 2021-11-10 DIAGNOSIS — F41.1 GENERALIZED ANXIETY DISORDER: Chronic | ICD-10-CM

## 2021-11-10 DIAGNOSIS — F90.0 ATTENTION DEFICIT HYPERACTIVITY DISORDER, INATTENTIVE TYPE: ICD-10-CM

## 2021-11-10 DIAGNOSIS — F31.12 BIPOLAR 1 DISORDER WITH MODERATE MANIA (HCC): Primary | ICD-10-CM

## 2021-11-10 PROCEDURE — 90834 PSYTX W PT 45 MINUTES: CPT | Performed by: SOCIAL WORKER

## 2021-11-15 RX ORDER — DEXTROAMPHETAMINE SACCHARATE, AMPHETAMINE ASPARTATE MONOHYDRATE, DEXTROAMPHETAMINE SULFATE AND AMPHETAMINE SULFATE 2.5; 2.5; 2.5; 2.5 MG/1; MG/1; MG/1; MG/1
10 CAPSULE, EXTENDED RELEASE ORAL EVERY MORNING
Qty: 16 CAPSULE | Refills: 0 | Status: SHIPPED | OUTPATIENT
Start: 2021-11-15 | End: 2021-12-01 | Stop reason: SDUPTHER

## 2021-12-01 ENCOUNTER — TELEMEDICINE (OUTPATIENT)
Dept: PSYCHIATRY | Facility: CLINIC | Age: 26
End: 2021-12-01
Payer: COMMERCIAL

## 2021-12-01 DIAGNOSIS — F41.1 GENERALIZED ANXIETY DISORDER: ICD-10-CM

## 2021-12-01 DIAGNOSIS — F90.0 ATTENTION DEFICIT HYPERACTIVITY DISORDER, INATTENTIVE TYPE: ICD-10-CM

## 2021-12-01 DIAGNOSIS — F31.9 BIPOLAR I DISORDER, IN FULL REMISSION (HCC): Primary | ICD-10-CM

## 2021-12-01 PROCEDURE — 99214 OFFICE O/P EST MOD 30 MIN: CPT | Performed by: NURSE PRACTITIONER

## 2021-12-01 RX ORDER — DEXTROAMPHETAMINE SACCHARATE, AMPHETAMINE ASPARTATE MONOHYDRATE, DEXTROAMPHETAMINE SULFATE AND AMPHETAMINE SULFATE 2.5; 2.5; 2.5; 2.5 MG/1; MG/1; MG/1; MG/1
10 CAPSULE, EXTENDED RELEASE ORAL EVERY MORNING
Qty: 30 CAPSULE | Refills: 0 | Status: SHIPPED | OUTPATIENT
Start: 2021-12-01 | End: 2021-12-30 | Stop reason: SDUPTHER

## 2021-12-08 ENCOUNTER — TELEMEDICINE (OUTPATIENT)
Dept: BEHAVIORAL/MENTAL HEALTH CLINIC | Facility: CLINIC | Age: 26
End: 2021-12-08
Payer: COMMERCIAL

## 2021-12-08 DIAGNOSIS — F31.9 BIPOLAR I DISORDER, IN FULL REMISSION (HCC): Primary | ICD-10-CM

## 2021-12-08 PROCEDURE — 90834 PSYTX W PT 45 MINUTES: CPT | Performed by: SOCIAL WORKER

## 2021-12-22 ENCOUNTER — TELEMEDICINE (OUTPATIENT)
Dept: BEHAVIORAL/MENTAL HEALTH CLINIC | Facility: CLINIC | Age: 26
End: 2021-12-22
Payer: COMMERCIAL

## 2021-12-22 DIAGNOSIS — F90.0 ATTENTION DEFICIT HYPERACTIVITY DISORDER, INATTENTIVE TYPE: ICD-10-CM

## 2021-12-22 DIAGNOSIS — F31.9 BIPOLAR I DISORDER, IN FULL REMISSION (HCC): Primary | ICD-10-CM

## 2021-12-22 DIAGNOSIS — F41.1 GENERALIZED ANXIETY DISORDER: Chronic | ICD-10-CM

## 2021-12-22 PROCEDURE — 90834 PSYTX W PT 45 MINUTES: CPT | Performed by: SOCIAL WORKER

## 2021-12-30 DIAGNOSIS — F90.0 ATTENTION DEFICIT HYPERACTIVITY DISORDER, INATTENTIVE TYPE: ICD-10-CM

## 2021-12-30 RX ORDER — DEXTROAMPHETAMINE SACCHARATE, AMPHETAMINE ASPARTATE MONOHYDRATE, DEXTROAMPHETAMINE SULFATE AND AMPHETAMINE SULFATE 2.5; 2.5; 2.5; 2.5 MG/1; MG/1; MG/1; MG/1
10 CAPSULE, EXTENDED RELEASE ORAL EVERY MORNING
Qty: 30 CAPSULE | Refills: 0 | Status: SHIPPED | OUTPATIENT
Start: 2021-12-30 | End: 2022-02-01 | Stop reason: SDUPTHER

## 2022-01-10 DIAGNOSIS — F31.9 BIPOLAR I DISORDER, IN FULL REMISSION (HCC): ICD-10-CM

## 2022-01-10 RX ORDER — ARIPIPRAZOLE 5 MG/1
5 TABLET ORAL DAILY
Qty: 30 TABLET | Refills: 2 | Status: SHIPPED | OUTPATIENT
Start: 2022-01-10 | End: 2022-04-18

## 2022-01-10 NOTE — TELEPHONE ENCOUNTER
----- Message from [address] [place] [address] sent at 1/10/2022 11:09 AM EST -----  Regarding: refill  ARIPiprazole (ABILIFY) 5 mg tablet  [address]  3/8/22

## 2022-01-19 ENCOUNTER — TELEMEDICINE (OUTPATIENT)
Dept: BEHAVIORAL/MENTAL HEALTH CLINIC | Facility: CLINIC | Age: 27
End: 2022-01-19
Payer: COMMERCIAL

## 2022-01-19 DIAGNOSIS — F31.9 BIPOLAR I DISORDER, IN FULL REMISSION (HCC): Primary | ICD-10-CM

## 2022-01-19 DIAGNOSIS — F90.0 ATTENTION DEFICIT HYPERACTIVITY DISORDER, INATTENTIVE TYPE: ICD-10-CM

## 2022-01-19 DIAGNOSIS — F41.1 GENERALIZED ANXIETY DISORDER: Chronic | ICD-10-CM

## 2022-01-19 PROCEDURE — 90834 PSYTX W PT 45 MINUTES: CPT | Performed by: SOCIAL WORKER

## 2022-01-19 NOTE — PSYCH
This note was not shared with the patient due to this is a psychotherapy note    Virtual Regular Visit    Verification of patient location:    Patient is located in the following state in which I hold an active license [place]      Assessment/Plan:    Problem List Items Addressed This Visit        Other    Generalized anxiety disorder (Chronic)    Bipolar I disorder, in full remission ([facility] [address] ) - Primary    Attention deficit hyperactivity disorder, inattentive type          Goals addressed in session: Goal 1 and Goal 2          Reason for visit is   Chief Complaint   Patient presents with    Virtual Regular Visit        Encounter provider Ketty Connolly    Provider located at [address] [place] [address]  [address]  [place] [address]  [phone]      Recent Visits  No visits were found meeting these conditions  Showing recent visits within past 7 days and meeting all other requirements  Today's Visits  Date Type Provider Dept   01/19/22 Telemedicine Ketty Connolly [facility] Therapist [facility]   Showing today's visits and meeting all other requirements  Future Appointments  No visits were found meeting these conditions  Showing future appointments within next 150 days and meeting all other requirements       The patient was identified by name and date of birth  Berna Blandon was informed that this is a telemedicine visit and that the visit is being conducted through[employer] and patient was informed that this is a secure, HIPAA-compliant platform  She agrees to proceed     My office door was closed  No one else was in the room  She acknowledged consent and understanding of privacy and security of the video platform  The patient has agreed to participate and understands they can discontinue the visit at any time  Patient is aware this is a billable service       Psychotherapy Provided: Individual Psychotherapy 45 minutes     Length of time in session: 45 minutes, follow up in 2 week    Encounter Diagnosis     ICD-10-CM    1  Bipolar I disorder, in full remission ([facility] [address] )  F31 9    2  Attention deficit hyperactivity disorder, inattentive type  F90 0    3  Generalized anxiety disorder  F41 1        Goals addressed in session: Goal 1 and Goal 2     Data: Moraima Vergara reported that she just returned home from a vacation which she enjoyed  Moraimasa Parisdimitrios discussed how she spent the holidays, reporting that she received thoughtful gifts  Moraima Vergara reported that she has been having trouble getting her ex-boyfriend out of her mind  Moraimasa Parisdimitrios discussed this in detail  Moraima Vergara went on to discuss her current relationship, identifying positive things about the dynamics between her and this new boyfriend  Writer actively listened and assisted Moraima Vergara with processing thoughts and feelings  Writer offered Guevara emotional validation  Writer and Moraima Parisdimitrios discussed the process of grieving relationships, as well as self-exploration to help Moraima Vergara feel more confident in what she is looking for in current relationships  Assessment: Moraima Vergara presented in a neutral mood with congruent affect  Moraima Vergara was well engaged and oriented X4  Moraima Vergara could benefit from self-exploration to help increase confidence and stability  Thought content was normal  Insight and judgement intact  SI/HI not reported or observed  Plan: Continue with individual psychotherapy  Current suicide risk : Low     Behavioral Health Treatment Plan [facility]: Diagnosis and Treatment Plan explained to Alla d'Ivoire relates understanding diagnosis and is agreeable to Treatment Plan  Yes      I spent 45 minutes directly with the patient during this visit    VIRTUAL VISIT [address] verbally agrees to participate in [employer]   Pt is aware that Virtual Care Services could be limited without vital signs or the ability to perform a full hands-on physical Gar Le understands she or the provider may request at any time to terminate the video visit and request the patient to seek care or treatment in person

## 2022-02-01 DIAGNOSIS — F90.0 ATTENTION DEFICIT HYPERACTIVITY DISORDER, INATTENTIVE TYPE: ICD-10-CM

## 2022-02-01 RX ORDER — DEXTROAMPHETAMINE SACCHARATE, AMPHETAMINE ASPARTATE MONOHYDRATE, DEXTROAMPHETAMINE SULFATE AND AMPHETAMINE SULFATE 2.5; 2.5; 2.5; 2.5 MG/1; MG/1; MG/1; MG/1
10 CAPSULE, EXTENDED RELEASE ORAL EVERY MORNING
Qty: 30 CAPSULE | Refills: 0 | Status: SHIPPED | OUTPATIENT
Start: 2022-02-01 | End: 2022-03-02 | Stop reason: SDUPTHER

## 2022-02-01 NOTE — TELEPHONE ENCOUNTER
----- Message from [address] [place] [address] sent at 2/1/2022  3:13 PM EST -----  Regarding: refill  amphetamine-dextroamphetamine (ADDERALL XR) 10 MG 24 hr capsule  1600 [facility], [address] [facility] [address]  3[address]8 [address]

## 2022-02-16 ENCOUNTER — TELEMEDICINE (OUTPATIENT)
Dept: BEHAVIORAL/MENTAL HEALTH CLINIC | Facility: CLINIC | Age: 27
End: 2022-02-16
Payer: COMMERCIAL

## 2022-02-16 DIAGNOSIS — F41.1 GENERALIZED ANXIETY DISORDER: Chronic | ICD-10-CM

## 2022-02-16 DIAGNOSIS — F31.9 BIPOLAR I DISORDER, IN FULL REMISSION (HCC): Primary | ICD-10-CM

## 2022-02-16 PROCEDURE — 90834 PSYTX W PT 45 MINUTES: CPT | Performed by: SOCIAL WORKER

## 2022-02-16 NOTE — PSYCH
This note was not shared with the patient due to this is a psychotherapy note    Virtual Regular Visit    Verification of patient location:    Patient is located in the following state in which I hold an active license [place]      Assessment/Plan:    Problem List Items Addressed This Visit        Other    Generalized anxiety disorder (Chronic)    Bipolar I disorder, in full remission ([facility] [address] ) - Primary          Goals addressed in session: Goal 1 and Goal 2          Reason for visit is   Chief Complaint   Patient presents with    Virtual Regular Visit        Encounter provider Eran Benoit    Provider located at [address]  [address]  [place] [address]  [phone]      Recent Visits  No visits were found meeting these conditions  Showing recent visits within past 7 days and meeting all other requirements  Today's Visits  Date Type Provider Dept   02/16/22 Telemedicine Eran Benoit [facility] Therapist [facility]   Showing today's visits and meeting all other requirements  Future Appointments  No visits were found meeting these conditions  Showing future appointments within next 150 days and meeting all other requirements       The patient was identified by name and date of birth  Hina Olson was informed that this is a telemedicine visit and that the visit is being conducted through[employer] and patient was informed that this is a secure, HIPAA-compliant platform  She agrees to proceed     My office door was closed  No one else was in the room  She acknowledged consent and understanding of privacy and security of the video platform  The patient has agreed to participate and understands they can discontinue the visit at any time  Patient is aware this is a billable service       Psychotherapy Provided: Individual Psychotherapy 45 minutes     Length of time in session: 45 minutes, follow up in 2 week    Encounter Diagnosis     ICD-10-CM    1  Bipolar I disorder, in full remission ([facility] [address] )  F31 9    2  Generalized anxiety disorder  F41 1        Goals addressed in session: Goal 1 and Goal 2     Data: Janet Cordova reported that the past week has been full of celebrations with Yuki's Day and her boyfriend's birthday  Janet Cordova reported that she is also in the process of preparing to try out for an [employer]   cheer[employer] team  Janet Cordova reported that she has tried out in the past but did not make it; however, she recently learned of the tryouts again and felt inspired to take the chance based on a conversation that she had with her  prior to his passing  Janet Art discussed what is involved in the process as well as her thoughts and feelings  Janetshira Cordova went on to discuss thoughts and feelings related to her relationship with her boyfriend  Writer actively listened and assisted Janet Cordova by exploring and processing thoughts and feelings  Writer and Janet Cordova reviewed her treatment plan  Janetshira Cordova offered insight regarding her progress and agreed to continue working on her goals  Writer emphasized self-care and continued effort with healthy coping  Assessment: Janet Cordova presented in a neutral mood with congruent affect  Janetshira Cordova was well engaged and oriented X4  Blake mood is currently stable and managed with medication and healthy coping  Thought content was normal  Insight and judgement are intact  SI/HI not reported or observed  Plan: Continue with individual psychotherapy  Current suicide risk : Low     Behavioral Health Treatment Plan [facility]: Diagnosis and Treatment Plan explained to Alla d'Ivoire relates understanding diagnosis and is agreeable to Treatment Plan  Yes     I spent 45 minutes directly with the patient during this visit    VIRTUAL VISIT [address] verbally agrees to participate in [facility]   Pt is aware that Virtual Care Services could be limited without vital signs or the ability to perform a full hands-on physical Rosa Locks understands she or the provider may request at any time to terminate the video visit and request the patient to seek care or treatment in person

## 2022-02-16 NOTE — BH TREATMENT PLAN
China Hubbard  1995         Date of Initial Treatment Plan: First [place] plan with new Therapist 4/9/2021      Date of Current Treatment Plan: 2/16/22     Treatment Plan Number 3     Strengths/Personal Resources for Self Care: Persistent, "I have a big heart," motivated toward treatment            Diagnosis:   1  Generalized anxiety disorder      2  Posttraumatic stress disorder      3  Bipolar 1 disorder with moderate willard (HCC)            Area of Needs: adjusting to diagnosis of Bipolar, self-awareness, mood regulation        Long Term Goal 1: Faith Morillo will increase self-awareness regarding symptoms of Bipolar Disorder      Target Date: 5/16/22  Completion Date: TBD         Short Term Objectives for Goal 1:               A  Faith Morillo will explore and process her experience and symptoms of Bipolar Disorder during session  (completed, 7/9/21)              B  Faith Morillo will receive psychoeducation regarding Bipolar Disorder to assist with understanding of symptoms  (completed as of 2/16/22)               C  Faith Morillo will process thoughts and feelings regarding her diagnosis of Bipolar Disorder during session  (ongoing as of 2/16/22)     Long Term Goal 2: Faith Morillo will utilize healthy coping skills to assist with regulating mood      Target Date: 5/16/22  Completion Date: TBD     Short Term Objectives for Goal 2:           A  Faith Morillo will identify current use of healthy coping skills  (completed, 7/9/21)          B  Faith Morillo will receive psycho-education regarding skills to help regulate mood  (complted as of 2/16/22)          C  Faith Morillo will implement learned skills and process barriers and/or successes to regulating mood   (ongoing as of 2/16/22)     GOAL 1: Modality: Individual 2x per month   Completion Date TBD, Medication Management and The person(s) responsible for carrying out the plan is NAZIA Ortiz, Therapist      GOAL 2: Modality: Individual 2x per month   Completion Date TBD, Medication Management and The person(s) responsible for carrying out the plan is NAZIA Ortiz, [address] [facility]: Diagnosis and Treatment Plan explained to Ari Camarillo understanding diagnosis and is agreeable to Treatment Plan          Client Comments : Please share your thoughts, feelings, need and/or experiences regarding your treatment plan: "Yes, I agree "     Treatment Plan done but not signed at time of office visit due to:  Plan reviewed by phone or in person  and verbal consent given due to [address] distancing

## 2022-02-24 ENCOUNTER — TELEPHONE (OUTPATIENT)
Dept: PSYCHIATRY | Facility: CLINIC | Age: 27
End: 2022-02-24

## 2022-02-24 ENCOUNTER — TELEMEDICINE (OUTPATIENT)
Dept: BEHAVIORAL/MENTAL HEALTH CLINIC | Facility: CLINIC | Age: 27
End: 2022-02-24
Payer: COMMERCIAL

## 2022-02-24 DIAGNOSIS — F90.0 ATTENTION DEFICIT HYPERACTIVITY DISORDER, INATTENTIVE TYPE: ICD-10-CM

## 2022-02-24 DIAGNOSIS — F31.9 BIPOLAR I DISORDER, IN FULL REMISSION (HCC): Primary | ICD-10-CM

## 2022-02-24 DIAGNOSIS — F41.1 GENERALIZED ANXIETY DISORDER: Chronic | ICD-10-CM

## 2022-02-24 PROCEDURE — 90832 PSYTX W PT 30 MINUTES: CPT | Performed by: SOCIAL WORKER

## 2022-02-24 NOTE — TELEPHONE ENCOUNTER
Received call from patient requesting to make an apt with Claudell Loyal    She has been scheduled for a virtual apt  this afternoon

## 2022-02-24 NOTE — PSYCH
This note was not shared with the patient due to this is a psychotherapy note     Psychotherapy Provided: Individual Psychotherapy 30 minutes     Length of time in session: 30 minutes, follow up in 2 week    Encounter Diagnosis     ICD-10-CM    1  Bipolar I disorder, in full remission ([facility] [address] )  F31 9    2  Generalized anxiety disorder  F41 1    3  Attention deficit hyperactivity disorder, inattentive type  F90 0        Goals addressed in session: Goal 1 and Goal 2     Data: Faith Morillo informed Writer that she had to make the decision of putting her cat down this morning  Cuevasned Morillo discussed her cat's recent declining health and what led to this tough decision  Faith Morillo was in tears as she spoke about memories with her cat whom she had since she was 13years old  Faith Morillo recalled times when her cat was her only support  Writer actively listened as Faith Morillo shared and offered compassion and emotional validation  Writer encouraged Faith Morillo to allow herself to grieve and to engage in self-care during this challenging time  Assessment: Faith Morillo was grieving at the time of session  Cuevasned Morillo was in tears as she shared grief  Mood and affect were congruent  Faith Morillo utilized session time to cope with this significant loss  Thought content was normal  SI/HI not reported or observed  Plan: Continue with individual psychotherapy  Current suicide risk : Low      Behavioral Health Treatment Plan [facility]: Diagnosis and Treatment Plan explained to Alla d'Ivoirradha relates understanding diagnosis and is agreeable to Treatment Plan   Yes

## 2022-03-02 ENCOUNTER — TELEMEDICINE (OUTPATIENT)
Dept: BEHAVIORAL/MENTAL HEALTH CLINIC | Facility: CLINIC | Age: 27
End: 2022-03-02
Payer: COMMERCIAL

## 2022-03-02 ENCOUNTER — TELEPHONE (OUTPATIENT)
Dept: PSYCHIATRY | Facility: CLINIC | Age: 27
End: 2022-03-02

## 2022-03-02 DIAGNOSIS — F31.9 BIPOLAR I DISORDER, IN FULL REMISSION (HCC): Primary | ICD-10-CM

## 2022-03-02 DIAGNOSIS — F90.0 ATTENTION DEFICIT HYPERACTIVITY DISORDER, INATTENTIVE TYPE: ICD-10-CM

## 2022-03-02 DIAGNOSIS — F41.1 GENERALIZED ANXIETY DISORDER: Chronic | ICD-10-CM

## 2022-03-02 PROCEDURE — 90834 PSYTX W PT 45 MINUTES: CPT | Performed by: SOCIAL WORKER

## 2022-03-02 RX ORDER — DEXTROAMPHETAMINE SACCHARATE, AMPHETAMINE ASPARTATE MONOHYDRATE, DEXTROAMPHETAMINE SULFATE AND AMPHETAMINE SULFATE 2.5; 2.5; 2.5; 2.5 MG/1; MG/1; MG/1; MG/1
10 CAPSULE, EXTENDED RELEASE ORAL EVERY MORNING
Qty: 30 CAPSULE | Refills: 0 | Status: SHIPPED | OUTPATIENT
Start: 2022-03-02 | End: 2022-04-04 | Stop reason: SDUPTHER

## 2022-03-02 NOTE — TELEPHONE ENCOUNTER
amphetamine-dextroamphetamine (ADDERALL XR) 10 MG 24 hr capsule    [facility] [address]      Next appt 3/8/22

## 2022-03-02 NOTE — PSYCH
This note was not shared with the patient due to this is a psychotherapy note    Virtual Regular Visit    Verification of patient location:    Patient is located in the following state in which I hold an active license [place]      Assessment/Plan:    Problem List Items Addressed This Visit        Other    Generalized anxiety disorder (Chronic)    Bipolar I disorder, in full remission ([facility] [address] ) - Primary    Attention deficit hyperactivity disorder, inattentive type          Goals addressed in session: Goal 1 and Goal 2          Reason for visit is   Chief Complaint   Patient presents with    Virtual Regular Visit        Encounter provider Shahrzad Mayo    Provider located at [address] [place] [address]  [address]  [facility] [address]  [phone]      Recent Visits  Date Type Provider Dept   02/24/22 [address] [place] [address] [facility] Therapist [facility]   Showing recent visits within past 7 days and meeting all other requirements  Today's Visits  Date Type Provider Dept   03/02/22 Telemedicine Shahrzad Mayo [facility] Therapist [facility]   Showing today's visits and meeting all other requirements  Future Appointments  No visits were found meeting these conditions  Showing future appointments within next 150 days and meeting all other requirements       The patient was identified by name and date of birth  Karrie Ford was informed that this is a telemedicine visit and that the visit is being conducted [facility] and patient was informed that this is a secure, HIPAA-compliant platform  She agrees to proceed     My office door was closed  No one else was in the room  She acknowledged consent and understanding of privacy and security of the video platform  The patient has agreed to participate and understands they can discontinue the visit at any time      Patient is aware this is a billable service  Psychotherapy Provided: Individual Psychotherapy 45 minutes     Length of time in session: 45 minutes, follow up in 2 week    Encounter Diagnosis     ICD-10-CM    1  Bipolar I disorder, in full remission ([facility] [address] )  F31 9    2  Generalized anxiety disorder  F41 1    3  Attention deficit hyperactivity disorder, inattentive type  F90 0        Goals addressed in session: Goal 1 and Goal 2     Data: Juan Jose Sharp reported that she got a new cat last week after having to put her cat of 17 years down  Juan Jose Sharp reported that the decision was somewhat impulsive, but also necessary as she was struggling so severely with her grief  Juan Jose Sharp reported that she is still struggling with grief, feeling as though she does not want to get out of bed, but that her new cat gives her a reason to get up  Writer spent time exploring and processing thoughts and feelings of grief with Juan Jose Sharp  Writer actively listened and offered emotional validation and normalization of her emotions  Juan Jose Sharp went on to discuss an interaction that she had with her biological mother which bothered her  Juan Jose Sharp shared about her mother's mental health challenges and how they have impacted her mother's ability to function as well as their relationship  Juan Jose Sharp reported that she does have empathy for her mother which increased after her own experience with willard  Writer actively listened and processed thoughts and feelings  Writer offered emotional validation  Writer and Juan Jose Sharp identified the healthy steps that she has taken for herself both physically and mentally  Writer encouraged continued use of self-care and healthy coping  Assessment: Juan Jose Sharp presented in a mildly depressed mood with congruent affect  Juan Jose Sharp was well engaged and oriented X4  Guevara utilized session time well, expressing thoughts and feelings of grief   Grief has exacerbated depressive symptoms; however, Juan Jose Sharp is coping well and utilizing her support system  Thought content was normal  Insight and judgement were intact  SI/HI not reported or observed  Plan: continue with individual psychotherapy  Current suicide risk : Low     Behavioral Health Treatment Plan [facility]: Diagnosis and Treatment Plan explained to Alla d'Ivoire relates understanding diagnosis and is agreeable to Treatment Plan  Yes      I spent 45 minutes directly with the patient during this visit    VIRTUAL VISIT [address] verbally agrees to participate in [employer]  Pt is aware that [employer] could be limited without vital signs or the ability to perform a full hands-on physical Trisha Clear understands she or the provider may request at any time to terminate the video visit and request the patient to seek care or treatment in person

## 2022-03-03 NOTE — PROGRESS NOTES
Pt called requesting refill of Adderall 10 mg caps   [facility] website data reviewed, escript sent to pt's pharmacy

## 2022-03-08 ENCOUNTER — TELEMEDICINE (OUTPATIENT)
Dept: PSYCHIATRY | Facility: CLINIC | Age: 27
End: 2022-03-08
Payer: COMMERCIAL

## 2022-03-08 DIAGNOSIS — T50.905D ADVERSE DRUG EFFECT, SUBSEQUENT ENCOUNTER: ICD-10-CM

## 2022-03-08 DIAGNOSIS — F31.9 BIPOLAR I DISORDER, IN FULL REMISSION (HCC): ICD-10-CM

## 2022-03-08 DIAGNOSIS — F90.0 ATTENTION DEFICIT HYPERACTIVITY DISORDER, INATTENTIVE TYPE: Primary | ICD-10-CM

## 2022-03-08 DIAGNOSIS — F41.1 GENERALIZED ANXIETY DISORDER: ICD-10-CM

## 2022-03-08 PROCEDURE — 99214 OFFICE O/P EST MOD 30 MIN: CPT | Performed by: NURSE PRACTITIONER

## 2022-03-08 NOTE — PSYCH
PROGRESS NOTE        [address] [facility]      Name and Date of Birth:  Kay Reese 27 y o  1995    Date of Visit: 22      Kay Reese was seen today for medication management and brief psychotherapy for anxiety, depression, ADHD      Virtual Regular Visit    After connecting through [employer], this patient was identified by name and date of birth, was informed that this is a telemedicine visit, and that it is being conducted through [facility], which this patient was informed is a secure HIPPA-compliant platform; this patient agreed to proceed  During this visit, I was alone in my office and my office door was closed  This patient acknowledged consent and understanding of privacy and security of this video platform  This patient gives consent to continue and knows that She can discontinue the visit at any time  Pt verifies that She is physically located in [place], the [facility] in which I am licensed as an APN with prescriptive authority  Time spent on psychotherapy: 10 minutes    Treatment modality:   Medication management   Medication education  Supportive psychotherapy  Encouraged and reinforced adaptive behavior      SUBJECTIVE: taking medication as prescribed, denies side effects  Symptoms of ADHD controled with Adderall  Some mild breast tenderness  Had a couple of setbacks, her  passed away, and her old cat also    Pt has a new cat, a kitten named Darshan Olmedo, and is getting used to living with a kitten  Appetite is good  Hasn't been taking alprazolam much, did when her cat passed away  Sleeping a little less, falls asleep at 11 pm wakes up at 6 am  Working in part-time position but working full-time hours because she can  a third 12 hour day  Taking the semester off college, considering what she wants to do  MAHAMED has a master's degree in leadership, and Elvin Short has a psych masters, no matter what a masters will give her a salary increase  Asks for a letter so that she can keep Benjie Stevenson as an emotional support animal, just in case her landlord asks for one    12/1/2021: Pt says she is doing OK  Taking Adderall which helps her focus, and aripiprazole, rarely takes alprazolam, denies side effects from her psychiatric medications, breasts (due to [address]) but GYN told her it will take some time to finally go away  Thinks her moods are ok, stable, not super happy or depressed  She had trouble with psychopharmacology course, so she dropped it and is taking social determinants of health and says she feels refreshed  has a herniated disc and is in a lot of pain, 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 so it is less pressure also  She doesn't feel confident in nursing the 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 in gymnastics  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  Just got the Moderna booster, feels aching joints, weak and lethargic  Normal appetite, no weight gain, broken sleep due to pain         She denies suicidal ideation, intent or plan at present, has no suicidal ideation, intent or plan at present  She denies any auditory hallucinations and visual hallucinations, denies any other delusional thinking, denies any delusional thinking  She denies any side effects from medications      HPI ROS Appetite Changes and Sleep:   Appetite - normal    Sleep - recently slightly decreased    Review Of Systems:      Constitutional Negative   ENT Negative   Cardiovascular Negative   Respiratory Negative   Gastrointestinal Negative   Genitourinary Negative   Musculoskeletal Negative   Integumentary Negative   Neurological Negative   Endocrine Negative   Other Symptoms Negative and None       Laboratory Results: No results found for this or any previous visit      Substance Abuse History:    Social History     Substance and Sexual Activity   Drug Use Not on file       Family Psychiatric History: No family history on file      The following portions of the patient's history were reviewed and updated as appropriate: past family history, past medical history, past social history, past surgical history and problem list     Social History     Socioeconomic History    Marital status: Single     Spouse name: Not on file    Number of children: Not on file    Years of education: Not on file    Highest education level: Not on file   Occupational History    Not on file   Tobacco Use    Smoking status: Not on file    Smokeless tobacco: Not on file   Substance and Sexual Activity    Alcohol use: Not on file    Drug use: Not on file    Sexual activity: Not on file   Other Topics Concern    Not on file   Social History Narrative    Not on file     Social Determinants of Health     Financial Resource Strain: Not on file   Food Insecurity: Not on file   Transportation Needs: Not on file   Physical Activity: Not on file   Stress: Not on file   Social Connections: Not on file   Intimate Partner Violence: Not on file   Housing Stability: Not on file     Social History     Social History Narrative    Not on file        Social History    None             OBJECTIVE:     Mental Status Evaluation:    Appearance age appropriate, casually dressed   Behavior pleasant, cooperative   Speech normal rate, rhythm, volume   Mood "happy"   Affect Mood-congruent, full   Thought Processes Coherent, organized, goal-directed   Associations intact associations   Thought Content normal   Perceptual Disturbances: none   Abnormal Thoughts  Risk Potential Suicidal ideation - None  Homicidal ideation - None  Potential for aggression - No   Orientation oriented to person, place, date and situation   Memory recent and remote memory grossly intact   Consciousness alert and awake   Attention Span attention span and concentration are age appropriate   Intellect Not formally assessed   Insight intact   Judgement intact    Muscle Strength and  Gait muscle strength and tone were normal, gait not observed   Language no difficulty naming common objects   Fund of Knowledge displays adequate knowledge of current events               The patient's chart was reviewed for relevant lab reports and recent encounters with other providers  Medications, treatment progress, and treatment plan enacted on  11/30/2/21 and due for review/update NLT 5/30/2/22  were reviewed with the patient  Treatment plan goals discussed in this encounter: continue to work toward improving control of anxiety        Assessment/Plan:       Diagnoses and all orders for this visit:    Attention deficit hyperactivity disorder, inattentive type    Bipolar I disorder, in full remission ([facility] [address] )    Generalized anxiety disorder    Adverse drug effect, subsequent encounter          Treatment Recommendations/Precautions:    Continue current medications:               aripiprazole 5 mg tab 1 tab po qAM              Adderall XR 10 mg one cap po qAM              alprazolam 0 25 mg tab 1 tab qd PRN anxiety/panic     Continue to see Ms Tammy Page LCSW for psychotherapy     Risks/Benefits       Risks, Benefits And Possible Side Effects Of Medications:     Risks, benefits, and possible side effects of medications explained to patient and patient verbalizes understanding and agreement for treatment      Controlled Medication Discussion:      DARELL SINGH [employer] website data was reviewed with pt  Discussed with patient the risks of sedation, respiratory depression, impairment of ability to drive and potential for abuse and addiction related to treatment with ALPRAZOLAM  The patient understands risk of treatment with ALPRAZOLAM, agrees to not drive if feeling impaired, to not take it when drinking alcohol, to not request early refills, to take medication as prescribed, and to not share it with others      DARELL SINGH [employer] website data was reviewed with pt   Discussed with patient the risks of potential for abuse and addiction related to treatment with ADDERALL   The patient understands risk of treatment with ADDERALL, agrees to not request early refills, to take medication as prescribed, and to not share it with others

## 2022-03-20 NOTE — PATIENT INSTRUCTIONS
Continue current medications:               aripiprazole 5 mg tab 1 tab po qAM              Adderall XR 10 mg one cap po qAM              alprazolam 0 25 mg tab 1 tab qd PRN anxiety/panic

## 2022-04-04 DIAGNOSIS — F90.0 ATTENTION DEFICIT HYPERACTIVITY DISORDER, INATTENTIVE TYPE: ICD-10-CM

## 2022-04-04 RX ORDER — DEXTROAMPHETAMINE SACCHARATE, AMPHETAMINE ASPARTATE MONOHYDRATE, DEXTROAMPHETAMINE SULFATE AND AMPHETAMINE SULFATE 2.5; 2.5; 2.5; 2.5 MG/1; MG/1; MG/1; MG/1
10 CAPSULE, EXTENDED RELEASE ORAL EVERY MORNING
Qty: 30 CAPSULE | Refills: 0 | Status: SHIPPED | OUTPATIENT
Start: 2022-04-04 | End: 2022-05-03 | Stop reason: SDUPTHER

## 2022-04-04 NOTE — TELEPHONE ENCOUNTER
----- Message from [address] [place] [address] sent at 4/4/2022  8:27 AM EDT -----  Regarding: refill  amphetamine-dextroamphetamine (ADDERALL XR) 10 MG 24 hr capsule    [address] [facility] [place] [address] [facility] [address]    6/1/22 LB

## 2022-04-13 ENCOUNTER — TELEMEDICINE (OUTPATIENT)
Dept: BEHAVIORAL/MENTAL HEALTH CLINIC | Facility: CLINIC | Age: 27
End: 2022-04-13
Payer: COMMERCIAL

## 2022-04-13 DIAGNOSIS — F41.1 GENERALIZED ANXIETY DISORDER: Chronic | ICD-10-CM

## 2022-04-13 DIAGNOSIS — F31.9 BIPOLAR I DISORDER, IN FULL REMISSION (HCC): Primary | ICD-10-CM

## 2022-04-13 PROCEDURE — 90834 PSYTX W PT 45 MINUTES: CPT | Performed by: SOCIAL WORKER

## 2022-04-13 NOTE — PSYCH
This note was not shared with the patient due to this is a psychotherapy note    Virtual Regular Visit    Verification of patient location:    Patient is located in the following state in which I hold an active license [place]      Assessment/Plan:    Problem List Items Addressed This Visit        Other    Generalized anxiety disorder (Chronic)    Bipolar I disorder, in full remission ([facility] [address] ) - Primary          Goals addressed in session: Goal 1          Reason for visit is   Chief Complaint   Patient presents with    Virtual Regular Visit        Encounter provider Christine Luciano    Provider located at [address]  [address] [place] [address]  [phone]      Recent Visits  No visits were found meeting these conditions  Showing recent visits within past 7 days and meeting all other requirements  Today's Visits  Date Type Provider Dept   04/13/22 Telemedicine Christine Luciano [facility] Therapist [facility]   Showing today's visits and meeting all other requirements  Future Appointments  No visits were found meeting these conditions  Showing future appointments within next 150 days and meeting all other requirements       The patient was identified by name and date of birth  Mila Thao was informed that this is a telemedicine visit and that the visit is being conducted through[employer] and patient was informed that this is a secure, HIPAA-compliant platform  She agrees to proceed     My office door was closed  No one else was in the room  She acknowledged consent and understanding of privacy and security of the video platform  The patient has agreed to participate and understands they can discontinue the visit at any time  Patient is aware this is a billable service       Psychotherapy Provided: Individual Psychotherapy 45 minutes     Length of time in session: 45 minutes, follow up in 2 week    Encounter Diagnosis     ICD-10-CM    1  Bipolar I disorder, in full remission ([facility] [address] )  F31 9    2  Generalized anxiety disorder  F41 1        Goals addressed in session: Goal 1     Data: Jacqueline Erwin reported that she has been feeling tired from working a lot  Jacqueline Yaima reported that she recently noticed that she gained more weight which has been a significant source of stress  Jacqueline Erwin reported that she has been exercising and being mindful of her eating, and still continues to gain  Jacqueline Erwin reported that she believes her medication (Abilify) may be the source of the weight gain  Jacqueline Erwin identified the stability she has had from this medication in regard to Bipolar symptoms, but is negatively impacted by how this affects her body  Writer advised Jacqueline Yaima to speak to her provider about these concerns, Jacqueline Erwin agreed to schedule an appointment  Jacqueline Erwin discussed continued grief of the loss of her cat and her  earlier this year  Jacqueline Yaima reported that there are times when she "acts like it didn't happen" and other times when she has emotional break downs  Writer spent time exploring this with Jacqueline Erwin, educating on the process of grief and normalizing her emotional journey  Writer identified successes and strengths that Jacqueline Erwin has had with coping and resiliency  Writer emphasized self-care and continued use of healthy coping skills  Assessment: Jacqueline Erwin presented in a neutral mood with congruent affect  Jacquelinefeliberto Erwin was well engaged and oriented X4  Bipolar symptoms are currently stable as reported by Jacqueline Erwin  Grief is present and continues to be an underlying source of heightened emotions at times  Thought content was normal, insight and judgement intact  SI/HI not reported or observed  Plan: Continue with individual psychotherapy      Current suicide risk : Low     Behavioral Health Treatment Plan [facility]: Diagnosis and Treatment Plan explained to [facility] relates understanding diagnosis and is agreeable to Treatment Plan  Yes     I spent 45 minutes directly with the patient during this visit    VIRTUAL VISIT [address] verbally agrees to participate in [employer]  Pt is aware that [employer] could be limited without vital signs or the ability to perform a full hands-on physical Pheobe Charter understands she or the provider may request at any time to terminate the video visit and request the patient to seek care or treatment in person

## 2022-04-18 DIAGNOSIS — F31.9 BIPOLAR I DISORDER, IN FULL REMISSION (HCC): ICD-10-CM

## 2022-04-18 RX ORDER — ARIPIPRAZOLE 5 MG/1
TABLET ORAL
Qty: 30 TABLET | Refills: 2 | Status: SHIPPED | OUTPATIENT
Start: 2022-04-18 | End: 2022-04-20 | Stop reason: SINTOL

## 2022-04-20 ENCOUNTER — TELEMEDICINE (OUTPATIENT)
Dept: PSYCHIATRY | Facility: CLINIC | Age: 27
End: 2022-04-20
Payer: COMMERCIAL

## 2022-04-20 DIAGNOSIS — F31.9 BIPOLAR I DISORDER, IN FULL REMISSION (HCC): Primary | ICD-10-CM

## 2022-04-20 DIAGNOSIS — F41.1 GENERALIZED ANXIETY DISORDER: ICD-10-CM

## 2022-04-20 DIAGNOSIS — F90.0 ATTENTION DEFICIT HYPERACTIVITY DISORDER, INATTENTIVE TYPE: ICD-10-CM

## 2022-04-20 PROBLEM — T50.905D ADVERSE DRUG EFFECT, SUBSEQUENT ENCOUNTER: Status: ACTIVE | Noted: 2022-04-20

## 2022-04-20 PROCEDURE — 99214 OFFICE O/P EST MOD 30 MIN: CPT | Performed by: NURSE PRACTITIONER

## 2022-04-20 NOTE — PATIENT INSTRUCTIONS
Stop aripiprazole 5 mg tablet     Start: Latuda 40 mg tablet - take without food x 7 days then take with food ongoing      Continue current medications:               Adderall XR 10 mg one cap po qAM              alprazolam 0 25 mg tab 1 tab qd PRN anxiety/panic

## 2022-04-20 NOTE — BH TREATMENT PLAN
TREATMENT PLAN         [facility]    Name and Date of Birth:  Claire Caraballo 32 y o  1995    Date of Treatment Plan: April 20, 2022    Diagnosis/Diagnoses:    1  Attention deficit hyperactivity disorder, inattentive type    2  Bipolar I disorder, in full remission ([facility] [address] )    3  Generalized anxiety disorder      Strengths/Personal Resources for Self-Care: taking medications as prescribed, ability to communicate well, general fund of knowledge, good physical health, good understanding of illness, motivation for treatment      Area/Areas of need (in own words): anxiety symptoms  1          Long Term Goal: continue to work toward improving control of anxiety  Target date - 10/783339  Person/Persons responsible for completion of goal: shannen Waterman     2          Short Term Objective (s) - How will we reach this goal?:   A  Provider new recommended medication/dosage changes and/or continue medication(s): continue current medications as Adderall, Latuda, Xanax  B  Continue to see Lea Nicholson [facility] for psychotherapy  C  take medications as prescribed, attend scheduled appts  Target date - 10/382635  Person/Persons Responsible for Completion of Goal: shannen Waterman     Progress Towards Goals: continuing treatment     Treatment Modality: medication management every 12 weeks     Review due 120 - 180 days from date of this plan: 10/142750  Expected length of service: ongoing treatment  My Physician/PA/NP and I have developed this plan together and I agree to work on the goals and objectives  I understand the treatment goals that were developed for my treatment

## 2022-04-20 NOTE — PSYCH
This note was not shared with the patient due to privacy exception: note includes other individuals    [facility]      Name and Date of Birth:  Prieto Tracey 32 y o  1995    Date of Visit: 04/20/22      Prieto Tracey was seen today for medication management and brief psychotherapy for ADHD (inattentive), mood instability      Virtual Regular Visit    After connecting through [employer], this patient was identified by name and date of birth, was informed that this is a telemedicine visit, and that it is being conducted through [facility], which this patient was informed is a secure HIPPA-compliant platform; this patient agreed to proceed  During this visit, I was alone in my office and my office door was closed  This patient acknowledged consent and understanding of privacy and security of this video platform  This patient gives consent to continue and knows that She can discontinue the visit at any time  Pt verifies that She is physically located in [facility], the [facility] in which I am licensed as an APN with prescriptive authority        Time spent on psychotherapy: 14 minutes    Treatment modality:   Medication management   Medication education  Supportive psychotherapy  Encouraged and reinforced adaptive behavior        SUBJECTIVE: taking medications as prescribed, reports weight gain related to aripiprazole  Would like to restart Latuda because she didn't gain weight when she took it, and hasn't been able to lose weight no matter what she does  Feels emotionally well with aripiprazole, but the weight gain is distressing  Eating regularly, watches what she eats, sleep 10 pm to 6 am  May have some junk food in the break room at work but that's three days per week  No breast tenderness  A friend is newly diagnosed with cancer, pt is coping with this in addition to the losses in her life in the past year, her new cat Juan Flowers makes a tremendous difference to her and is a great source of comfort      3/8/2022: taking medication as prescribed, denies side effects  Symptoms of ADHD controled with Adderall  Some mild breast tenderness  Had a couple of setbacks, her  passed away, and her old cat also   Pt has a new cat, a kitten named Donal Beard, and is getting used to living with a kitten  Appetite is good  Hasn't been taking alprazolam much, did when her cat passed away  Sleeping a little less, falls asleep at 11 pm wakes up at 6 am  Working in part-time position but working full-time hours because she can  a third 12 hour day  Taking the semester off college, considering what she wants to do  MAHAMED has a master's degree in leadership, and Sebastián Rossi has a psych masters, no matter what a masters will give her a salary increase  Asks for a letter so that she can keep Donal Beard as an emotional support animal, just in case her landlord asks for one  Continue current medications:               aripiprazole 5 mg tab 1 tab po qAM              Adderall XR 10 mg one cap po qAM              alprazolam 0 25 mg tab 1 tab qd PRN anxiety/panic         She denies suicidal ideation, intent or plan at present, has no suicidal ideation, intent or plan at present  She denies any auditory hallucinations and visual hallucinations, denies any other delusional thinking, denies any delusional thinking  She denies any side effects from medications      HPI ROS Appetite Changes and Sleep:   Appetite - normal    Sleep - normal    Review Of Systems:      Constitutional Negative   ENT Negative   Cardiovascular Negative   Respiratory Negative   Gastrointestinal Negative   Genitourinary Negative   Musculoskeletal Negative   Integumentary Negative   Neurological Negative   Endocrine Negative   Other Symptoms Negative and None       Laboratory Results: No results found for this or any previous visit      Substance Abuse History:    Social History Substance and Sexual Activity   Drug Use Not on file       Family Psychiatric History:     No family history on file      The following portions of the patient's history were reviewed and updated as appropriate: past family history, past medical history, past social history, past surgical history and problem list     Social History     Socioeconomic History    Marital status: Single     Spouse name: Not on file    Number of children: Not on file    Years of education: Not on file    Highest education level: Not on file   Occupational History    Not on file   Tobacco Use    Smoking status: Not on file    Smokeless tobacco: Not on file   Substance and Sexual Activity    Alcohol use: Not on file    Drug use: Not on file    Sexual activity: Not on file   Other Topics Concern    Not on file   Social History Narrative    Not on file     Social Determinants of Health     Financial Resource Strain: Not on file   Food Insecurity: Not on file   Transportation Needs: Not on file   Physical Activity: Not on file   Stress: Not on file   Social Connections: Not on file   Intimate Partner Violence: Not on file   Housing Stability: Not on file     Social History     Social History Narrative    Not on file        Social History    None             OBJECTIVE:     Mental Status Evaluation:    Appearance age appropriate, casually dressed   Behavior pleasant, cooperative   Speech normal rate, rhythm, volume   Mood good   Affect Mood-congruent, full   Thought Processes Coherent, organized, goal-directed   Associations intact associations   Thought Content normal   Perceptual Disturbances: none   Abnormal Thoughts  Risk Potential Suicidal ideation - None  Homicidal ideation - None  Potential for aggression - No   Orientation oriented to person, place, date and situation   Memory recent and remote memory grossly intact   Consciousness alert and awake   Attention Span attention span and concentration are age appropriate Intellect Not formally assessed   Insight intact   Judgement intact    Muscle Strength and  Gait muscle strength and tone were normal, gait not observed   Language no difficulty naming common objects   Fund of Knowledge displays adequate knowledge of current events               The patient's chart was reviewed for relevant lab reports and recent encounters with other providers  Medications, treatment progress, and current treatment plan that was enacted on 11/30/2021 and due for review/update on 5/30/2022 were reviewed with the patient  The treatment plan was updated today with the patient who verbally agreed with the updated plan  Today's treatment plan is due for review/update NLT 10/20/2022  Pt and writer were unable to sign plan because this was a virtual appointment  Treatment plan goals discussed in this encounter: continue to work toward improving control of anxiety        Assessment/Plan:       Diagnoses and all orders for this visit:    Attention deficit hyperactivity disorder, inattentive type    Bipolar I disorder, in full remission ([facility] [address] )    Generalized anxiety disorder        Treatment Recommendations/Precautions:    Stop aripiprazole 5 mg tablet     Start: Latuda 40 mg tablet - take without food x 7 days then take with food ongoing      Continue current medications:               Adderall XR 10 mg one cap po qAM              alprazolam 0 25 mg tab 1 tab qd PRN anxiety/panic     Continue to see Ms Hayder Donato LCSW for psychotherapy     Risks/Benefits       Risks, Benefits And Possible Side Effects Of Medications:     Risks, benefits, and possible side effects of medications explained to patient and patient verbalizes understanding and agreement for treatment      Controlled Medication Discussion:      DARELL SINGH  website data was reviewed with pt   Discussed with patient the risks of sedation, respiratory depression, impairment of ability to drive and potential for abuse and addiction related to treatment with ALPRAZOLAM  The patient understands risk of treatment with ALPRAZOLAM, agrees to not drive if feeling impaired, to not take it when drinking alcohol, to not request early refills, to take medication as prescribed, and to not share it with others      DARELL SINGH  website data was reviewed with pt  Discussed with patient the risks of potential for abuse and addiction related to treatment with ADDERALL   The patient understands risk of treatment with ADDERALL, agrees to not request early refills, to take medication as prescribed, and to not share it with others

## 2022-05-03 DIAGNOSIS — F90.0 ATTENTION DEFICIT HYPERACTIVITY DISORDER, INATTENTIVE TYPE: ICD-10-CM

## 2022-05-03 RX ORDER — DEXTROAMPHETAMINE SACCHARATE, AMPHETAMINE ASPARTATE MONOHYDRATE, DEXTROAMPHETAMINE SULFATE AND AMPHETAMINE SULFATE 2.5; 2.5; 2.5; 2.5 MG/1; MG/1; MG/1; MG/1
10 CAPSULE, EXTENDED RELEASE ORAL EVERY MORNING
Qty: 30 CAPSULE | Refills: 0 | Status: SHIPPED | OUTPATIENT
Start: 2022-05-03 | End: 2022-06-01 | Stop reason: DRUGHIGH

## 2022-05-03 NOTE — TELEPHONE ENCOUNTER
----- Message from Anahi Quarles sent at 5/3/2022  8:13 AM EDT -----  Regarding: Refill Request  amphetamine-dextroamphetamine (ADDERALL XR) 10 MG 24 hr capsule    [facility] [place] [facility] [address] [address]   Phone:  [phone]  Fax:  [phone]      Next Visit 6/1/2022 Gus Noble     Patient is out of medication

## 2022-05-11 ENCOUNTER — TELEMEDICINE (OUTPATIENT)
Dept: BEHAVIORAL/MENTAL HEALTH CLINIC | Facility: CLINIC | Age: 27
End: 2022-05-11
Payer: COMMERCIAL

## 2022-05-11 DIAGNOSIS — F31.9 BIPOLAR I DISORDER, IN FULL REMISSION (HCC): Primary | ICD-10-CM

## 2022-05-11 DIAGNOSIS — F90.0 ATTENTION DEFICIT HYPERACTIVITY DISORDER, INATTENTIVE TYPE: ICD-10-CM

## 2022-05-11 DIAGNOSIS — F41.1 GENERALIZED ANXIETY DISORDER: Chronic | ICD-10-CM

## 2022-05-11 PROCEDURE — 90834 PSYTX W PT 45 MINUTES: CPT | Performed by: SOCIAL WORKER

## 2022-05-11 NOTE — BH TREATMENT PLAN
Emanuel Elam  1995         Date of Initial Treatment Plan: First [place] plan with new Therapist 4/9/2021      Date of Current Treatment Plan: 5/11/22     Treatment Plan Number 4     Strengths/Personal Resources for Self Care: Persistent, "I have a big heart," motivated toward treatment            Diagnosis:   1  Generalized anxiety disorder      2  Posttraumatic stress disorder      3  Bipolar 1 disorder with moderate willard (HCC)            Area of Needs: adjusting to diagnosis of Bipolar, self-awareness, mood regulation        Long Term Goal 1: Evette Levy will increase self-awareness regarding symptoms of Bipolar Disorder      Target Date: 8/11/22  Completion Date: TBD         Short Term Objectives for Goal 1:               A  Evette Levy will explore and process her experience and symptoms of Bipolar Disorder during session  (completed, 7/9/21)              B  Evette Levy will receive psychoeducation regarding Bipolar Disorder to assist with understanding of symptoms  (completed as of 2/16/22)               C  Evette Levy will process thoughts and feelings regarding her diagnosis of Bipolar Disorder during session  (ongoing as of 5/11/22)     Long Term Goal 2: Evette Levy will utilize healthy coping skills to assist with regulating mood      Target Date: 8/11/22  Completion Date: TBD     Short Term Objectives for Goal 2:           A  Evette Levy will identify current use of healthy coping skills  (completed, 7/9/21)          B  Evette Levy will receive psycho-education regarding skills to help regulate mood  (complted as of 2/16/22)          C  Evette Levy will implement learned skills and process barriers and/or successes to regulating mood  (ongoing as of 5/11/22)    Long Term Goal 3: Evette Levy will recognize triggers to anxiety  NEW as of 5/11/22     Target Date: 8/11/22  Completion Date: TBD     Short Term Objectives for Goal 2:           A  Evette Levy will explore triggers to anxiety in session           B   Evette Levy will receive psycho-education regarding coping skills to address anxiety           WILLI  Jose Jt will implement learned skills and process barriers and/or successes to recognizing and addressing anxiety in a healthy manner      GOAL 1: Modality: Individual 2x per month   Completion Date TBD, Medication Management and The person(s) responsible for carrying out the plan is NAZIA Ortiz, Therapist      GOAL 2: Modality: Individual 2x per month   Completion Date TBD, Medication Management and The person(s) responsible for carrying out the plan is NAZIA Ortiz, Therapist      GOAL 3: Modality: Individual 2x per month   Completion Date TBD, Medication Management and The person(s) responsible for carrying out the plan is NAZIA Ortiz, Tyesha White: Diagnosis and Treatment Plan explained to Yuri Calderon understanding diagnosis and is agreeable to Treatment Plan          Client Comments : Please share your thoughts, feelings, need and/or experiences regarding your treatment plan: "Yes, I agree "    Govind Zapata, 1995, actively participated in the review and update of this treatment plan during a virtual session, using the [employer] platform  Govind Zapata  provided verbal consent on 5/11/2022 at 9:24 AM  The treatment plan was transcribed into the [employer] Record at a later time

## 2022-05-11 NOTE — PSYCH
This note was not shared with the patient due to this is a psychotherapy note     Virtual Regular Visit    Verification of patient location:    Patient is located in the following state in which I hold an active license [place]      Assessment/Plan:    Problem List Items Addressed This Visit        Other    Generalized anxiety disorder (Chronic)    Bipolar I disorder, in full remission ([facility] [address] ) - Primary    Attention deficit hyperactivity disorder, inattentive type          Goals addressed in session: Goal 1, Goal 2 and Goal 3           Reason for visit is   Chief Complaint   Patient presents with    Virtual Regular Visit        Encounter provider Christine Luciano    Provider located at [address] [place] [address]  [address]  [place] [address]  [phone]      Recent Visits  No visits were found meeting these conditions  Showing recent visits within past 7 days and meeting all other requirements  Today's Visits  Date Type Provider Dept   05/11/22 Telemedicine Christine Luciano [facility] Therapist [facility]   Showing today's visits and meeting all other requirements  Future Appointments  No visits were found meeting these conditions  Showing future appointments within next 150 days and meeting all other requirements       The patient was identified by name and date of birth  Mila Osuna was informed that this is a telemedicine visit and that the visit is being conducted through[employer] and patient was informed that this is a secure, HIPAA-compliant platform  She agrees to proceed     My office door was closed  No one else was in the room  She acknowledged consent and understanding of privacy and security of the video platform  The patient has agreed to participate and understands they can discontinue the visit at any time  Patient is aware this is a billable service       Psychotherapy Provided: Individual Psychotherapy 45 minutes     Length of time in session: 45 minutes, follow up in 2 week    Encounter Diagnosis     ICD-10-CM    1  Bipolar I disorder, in full remission ([facility] [address] )  F31 9    2  Generalized anxiety disorder  F41 1    3  Attention deficit hyperactivity disorder, inattentive type  F90 0        Goals addressed in session: Goal 1, Goal 2 and Goal 3      Data: Obdulio Lopez reported that she missed last session as she has been working overtime when she can  Obdulio Lopez reported that she believes she has noticed improvement in her energy and mood since starting Latuda again  Obdulio Lopez discussed these observations in detail  Obdulio Lopez identified stress related to commitments and finances  Obdulio Lopez also discussed making the decision to apply for a different major for her master's degree  Obdulio Lopez discussed the challenges that came with making this decision and the difficulty of adjusting her expectations for herself  Writer engaged Obdulio Lopez with updating her treatment plan  Obdulio Lopez added a new goal to recognize anxiety  Obdulio Lopez identified that increased anxiety leads her to avoid tasks  Obdulio Lopez provided examples  Writer explored this with Obdulio Lopez recognized that she has difficulty remaining on task and focusing  Writer advised Obdulio Lopez to discuss this with [employer]  Writer also provided psycho-education increasing focus by use of organization, schedule and routine  Obdulio Lopez agreed to implement use of a calendar to help organize her tasks and responsibilities  Assessment: Obdulio Lopez presented in an euthymic mood with congruent affect  Obdulio Lopez was well engaged and oriented X3  Eye contact was good  Speech was of normal rate and tone  Thought content and process were normal  Insight and judgement were intact  SI/HI not reported or observed during session  Plan: Continue with individual psychotherapy      Current suicide risk : Low     Behavioral Health Treatment Plan [facility]: Diagnosis and Treatment Plan explained to [place]a [place] relates understanding diagnosis and is agreeable to Treatment Plan  Yes     I spent 45 minutes directly with the patient during this visit    VIRTUAL VISIT [address] verbally agrees to participate in [employer]  Pt is aware that [employer] could be limited without vital signs or the ability to perform a full hands-on physical [facility] understands she or the provider may request at any time to terminate the video visit and request the patient to seek care or treatment in person

## 2022-05-16 ENCOUNTER — TELEPHONE (OUTPATIENT)
Dept: PSYCHIATRY | Facility: CLINIC | Age: 27
End: 2022-05-16

## 2022-05-16 DIAGNOSIS — F31.9 BIPOLAR I DISORDER, IN FULL REMISSION (HCC): Primary | ICD-10-CM

## 2022-05-16 NOTE — TELEPHONE ENCOUNTER
I received an incoming call from patient to ask for a refill of her lurasidone (latuda) 40 mg tablet prescribed by Keli HIGGINS  Patient stated that she is to take the 40 mg not the 20 mg tablet latuda  Patient's preferred pharmacy is [facility], [address] [address]  Please advise for further instructions

## 2022-05-25 ENCOUNTER — TELEMEDICINE (OUTPATIENT)
Dept: BEHAVIORAL/MENTAL HEALTH CLINIC | Facility: CLINIC | Age: 27
End: 2022-05-25
Payer: COMMERCIAL

## 2022-05-25 DIAGNOSIS — F41.1 GENERALIZED ANXIETY DISORDER: Chronic | ICD-10-CM

## 2022-05-25 DIAGNOSIS — F31.9 BIPOLAR I DISORDER, IN FULL REMISSION (HCC): Primary | ICD-10-CM

## 2022-05-25 PROCEDURE — 90834 PSYTX W PT 45 MINUTES: CPT | Performed by: SOCIAL WORKER
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
## 2022-05-26 ENCOUNTER — TELEMEDICINE (OUTPATIENT)
Dept: BEHAVIORAL/MENTAL HEALTH CLINIC | Facility: CLINIC | Age: 27
End: 2022-05-26
Payer: COMMERCIAL

## 2022-05-26 DIAGNOSIS — F41.1 GENERALIZED ANXIETY DISORDER: Chronic | ICD-10-CM

## 2022-05-26 DIAGNOSIS — F31.12 BIPOLAR 1 DISORDER WITH MODERATE MANIA (HCC): Primary | ICD-10-CM

## 2022-05-26 PROCEDURE — 90834 PSYTX W PT 45 MINUTES: CPT | Performed by: SOCIAL WORKER

## 2022-05-26 NOTE — PSYCH
This note was not shared with the patient due to this is a psychotherapy note    Virtual Regular Visit    Verification of patient location:    Patient is located in the following state in which I hold an active license [place]      Assessment/Plan:    Problem List Items Addressed This Visit        Other    Generalized anxiety disorder (Chronic)    Bipolar 1 disorder with moderate willard ([facility] [address] ) - Primary          Goals addressed in session: Goal 1          Reason for visit is   Chief Complaint   Patient presents with    Virtual Regular Visit        Encounter provider Gabino Weston    Provider located at [address] [place], [place] [address]  [place] [address]  [phone]      Recent Visits  Date Type Provider Dept   05/25/22 [address] [place] [address] [facility] Therapist [place]   Showing recent visits within past 7 days and meeting all other requirements  Today's Visits  Date Type Provider Dept   05/26/22 Telemedicine [facility]ino Weston  [facility]   Showing today's visits and meeting all other requirements  Future Appointments  No visits were found meeting these conditions  Showing future appointments within next 150 days and meeting all other requirements       The patient was identified by name and date of birth  Richy Gregorio was informed that this is a telemedicine visit and that the visit is being conducted throughepicurio and patient was informed that this is a secure, HIPAA-compliant platform  She agrees to proceed     My office door was closed  No one else was in the room  She acknowledged consent and understanding of privacy and security of the video platform  The patient has agreed to participate and understands they can discontinue the visit at any time  Patient is aware this is a billable service       Psychotherapy Provided: Individual Psychotherapy 45 minutes     Length of time in session: 45 minutes, follow up in 1 week    Encounter Diagnosis     ICD-10-CM    1  Bipolar 1 disorder with moderate willard ([facility] [address] )  F31 12    2  Generalized anxiety disorder  F41 1        Goals addressed in session: Goal 1 and Goal 2     Data: Writer engaged Daniel Ruiz for a virtual psychotherapy session via [employer] platform  [place] reported that she is struggling with depression and received a message from her boyfriend this morning that indicated that their relationship might be over  [place] reported feeling embarrassed and hurt by the message  [place] reported that she has noticed this pattern in her relationships that when a significant other sees a side of her that they do not like, they want to end the relationship  [place] provided examples and then reported that she might be "better off alone " Daniel Ruiz expressed a negative view of self as she discussed the limitations of Bipolar Disorder and how any instability for her impacts her relationships  [place] expressed a longing to find love, but also a hopelessness that she would be able to find this  Writer actively listened as [place] shared and assisted with processing thoughts and feelings  Writer offered emotional validation as needed but also challenged negative thought patterns  Writer recognized the depressive state that [place] is currently in and encouraged her to be kind to herself  [place] reported that she planned to spend time with a friend today and will be working throughout the weekend  Writer emphasized self-care and healthy coping  Assessment: Daniel Ruiz presented in a depressed mood with congruent affect  Daniel Ruiz was tearful throughout session  Daniel Ruiz was oriented X3  Eye contact was good  Speech was of normal rate and tone  Thought process was depressed  Insight was slightly limited due to depressive thoughts  Judgement was intact  SI/HI not reported or observed during session  Plan: Continue with individual psychotherapy  Current suicide risk : Low     Behavioral Health Treatment Plan [facility]: Diagnosis and Treatment Plan [facility] relates understanding diagnosis and is agreeable to Treatment Plan  Yes      I spent 45 minutes directly with the patient during this visit    VIRTUAL VISIT [address] verbally agrees to participate in [employer]  Pt is aware that [employer] could be limited without vital signs or the ability to perform a full hands-on physical Dallis Husbands understands she or the provider may request at any time to terminate the video visit and request the patient to seek care or treatment in person

## 2022-06-01 ENCOUNTER — TELEMEDICINE (OUTPATIENT)
Dept: PSYCHIATRY | Facility: CLINIC | Age: 27
End: 2022-06-01
Payer: COMMERCIAL

## 2022-06-01 DIAGNOSIS — F31.9 BIPOLAR I DISORDER, IN FULL REMISSION (HCC): ICD-10-CM

## 2022-06-01 DIAGNOSIS — F41.1 GENERALIZED ANXIETY DISORDER: ICD-10-CM

## 2022-06-01 DIAGNOSIS — F90.0 ATTENTION DEFICIT HYPERACTIVITY DISORDER, INATTENTIVE TYPE: Primary | ICD-10-CM

## 2022-06-01 PROCEDURE — 90833 PSYTX W PT W E/M 30 MIN: CPT | Performed by: NURSE PRACTITIONER

## 2022-06-01 PROCEDURE — 99214 OFFICE O/P EST MOD 30 MIN: CPT | Performed by: NURSE PRACTITIONER

## 2022-06-01 RX ORDER — DEXTROAMPHETAMINE SACCHARATE, AMPHETAMINE ASPARTATE MONOHYDRATE, DEXTROAMPHETAMINE SULFATE AND AMPHETAMINE SULFATE 3.75; 3.75; 3.75; 3.75 MG/1; MG/1; MG/1; MG/1
15 CAPSULE, EXTENDED RELEASE ORAL EVERY MORNING
Qty: 15 CAPSULE | Refills: 0 | Status: SHIPPED | OUTPATIENT
Start: 2022-06-01 | End: 2022-06-15 | Stop reason: SDUPTHER

## 2022-06-01 NOTE — PATIENT INSTRUCTIONS
Dose increase:  Adderall XR 15 mg one cap po qAM (previous dose: 10 mg qAM)     Continue current medications:               alprazolam 0 25 mg tab 1 tab qd PRN anxiety/panic              Latuda 40 mg tablet - take 1 tablet by mouth every day with food

## 2022-06-01 NOTE — PSYCH
This note was not shared with the patient due to privacy exception: note includes other individuals    [facility]      Name and Date of Birth:  Georges Mann 32 y o  1995    Date of Visit: 06/01/22      Georges Mann was seen today for medication management and brief psychotherapy for mood instability, ADHD inattentive        Virtual Regular Visit    After connecting through [employer], this patient was identified by name and date of birth, was informed that this is a telemedicine visit, and that it is being conducted through [facility], which this patient was informed is a secure HIPPA-compliant platform; this patient agreed to proceed  During this visit, I was alone in my office and my office door was closed  This patient acknowledged consent and understanding of privacy and security of this video platform  This patient gives consent to continue and knows that She can discontinue the visit at any time  Pt verifies that She is physically located in [place], the [facility] in which I am licensed as an APN with prescriptive authority        Time spent on psychotherapy:  20 minutes    Treatment modality:   Medication management   Medication education  Supportive psychotherapy  Encouraged and reinforced adaptive behavior  Work-related stress        SUBJECTIVE: taking medication as prescribed, denies side effects  Noticed difference with Latuda, flatter emotionally but it's ok, less anxiety  Maybe feels more toward depressive side but she keeps having people die and is having trouble with her relationship, feels most people would be depressed in her situation   Effect of Adderall has decreased, she finds herself starting things and becoming distracted and going from one task to another without finishing them, has also been procrastinating more; has spoken to Ms Alvaro Garcia about it and wonders if a dose increase of Adderall is advisable  Weight hasn't gone up but has maybe only lost 5 lbs, eats in moderation although not the healthiest   Sleep - good, regular  Lavon Cooper (kitten) is really good in her life, pt takes her to the beach and for walks in a harness  Has part-time job but working full-time hours; 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  Applied to [employer] program    4/20/2022: taking medications as prescribed, reports weight gain related to aripiprazole  Would like to restart Yodit because she didn't gain weight when she took it, and hasn't been able to lose weight no matter what she does  Feels emotionally well with aripiprazole, but the weight gain is distressing  Eating regularly, watches what she eats, sleep 10 pm to 6 am  May have some junk food in the break room at work but that's three days per week  No breast tenderness  A friend is newly diagnosed with cancer, pt is coping with this in addition to the losses in her life in the past year, her new cat Lavon Cooper makes a tremendous difference to her and is a great source of comfort   Stop aripiprazole 5 mg tablet   Start: Latuda 40 mg tablet - take without food x 7 days then take with food ongoing  Continue current medications:               Adderall XR 10 mg one cap po qAM              alprazolam 0 25 mg tab 1 tab qd PRN anxiety/panic  Continue to see Ms [facility] for psychotherapy         She denies suicidal ideation, intent or plan at present, has no suicidal ideation, intent or plan at present  She denies any auditory hallucinations and visual hallucinations, denies any other delusional thinking, denies any delusional thinking  She denies any side effects from medications        HPI ROS Appetite Changes and Sleep:   Appetite - normal - good    Sleep - normal    Review Of Systems:      Constitutional Negative   ENT Negative   Cardiovascular Negative   Respiratory Negative   Gastrointestinal Negative   Genitourinary Negative   Musculoskeletal Negative   Integumentary Negative   Neurological Negative   Endocrine Negative   Other Symptoms Negative and None       Laboratory Results: No results found for this or any previous visit  Substance Abuse History:    Social History     Substance and Sexual Activity   Drug Use Not on file       Family Psychiatric History:     No family history on file      The following portions of the patient's history were reviewed and updated as appropriate: past family history, past medical history, past social history, past surgical history and problem list     Social History     Socioeconomic History    Marital status: Single     Spouse name: Not on file    Number of children: Not on file    Years of education: Not on file    Highest education level: Not on file   Occupational History    Not on file   Tobacco Use    Smoking status: Not on file    Smokeless tobacco: Not on file   Substance and Sexual Activity    Alcohol use: Not on file    Drug use: Not on file    Sexual activity: Not on file   Other Topics Concern    Not on file   Social History Narrative    Not on file     Social Determinants of Health     Financial Resource Strain: Not on file   Food Insecurity: Not on file   Transportation Needs: Not on file   Physical Activity: Not on file   Stress: Not on file   Social Connections: Not on file   Intimate Partner Violence: Not on file   Housing Stability: Not on file     Social History     Social History Narrative    Not on file        Social History    None             OBJECTIVE:     Mental Status Evaluation:    Appearance age appropriate, casually dressed   Behavior pleasant, cooperative   Speech normal rate, rhythm, volume   Mood "neutral"   Affect Mood-congruent, full   Thought Processes Coherent, organized, goal-directed   Associations intact associations   Thought Content normal   Perceptual Disturbances: none   Abnormal Thoughts  Risk Potential Suicidal ideation - None  Homicidal ideation - None  Potential for aggression - No   Orientation oriented to person, place, date and situation   Memory recent and remote memory grossly intact   Consciousness alert and awake   Attention Span attention span and concentration are age appropriate   Intellect Not formally assessed   Insight intact   Judgement intact    Muscle Strength and  Gait muscle strength and tone were normal, gait normal   Language no difficulty naming common objects   Fund of Knowledge displays adequate knowledge of current events               The patient's chart was reviewed for relevant lab reports and recent encounters with other providers  Medications, treatment progress, and treatment plan enacted on 4/20/2022 were reviewed with the patient  Current treatment plan is due for review/update on NLT 10/20/2022    Treatment plan goals discussed in this encounter: continue to work toward improving control of anxiety        Assessment/Plan:       Diagnoses and all orders for this visit:    Attention deficit hyperactivity disorder, inattentive type  -     amphetamine-dextroamphetamine (ADDERALL XR, 15MG,) 15 MG 24 hr capsule;  Take 1 capsule (15 mg total) by mouth every morning Max Daily Amount: 15 mg    Bipolar I disorder, in full remission (HCC)    Generalized anxiety disorder          Treatment Recommendations/Precautions:    Dose increase: Adderall XR 15 mg one cap po qAM (previous dose: 10 mg qAM)    Continue current medications:               alprazolam 0 25 mg tab 1 tab qd PRN anxiety/panic   Latuda 40 mg tablet - take 1 tablet by mouth every day with food     Continue to see Ms Tierney Gaytan LCSW for psychotherapy       Risks/Benefits       Risks, Benefits And Possible Side Effects Of Medications:     Risks, benefits, and possible side effects of medications explained to patient and patient verbalizes understanding and agreement for treatment      Controlled Medication Discussion:      DARELL SINGH  website data was reviewed with pt  Discussed with patient the risks of sedation, respiratory depression, impairment of ability to drive and potential for abuse and addiction related to treatment with ALPRAZOLAM  The patient understands risk of treatment with ALPRAZOLAM, agrees to not drive if feeling impaired, to not take it when drinking alcohol, to not request early refills, to take medication as prescribed, and to not share it with others      DARELL SINGH  website data was reviewed with pt  Discussed with patient the risks of potential for abuse and addiction related to treatment with ADDERALL   The patient understands risk of treatment with ADDERALL, agrees to not request early refills, to take medication as prescribed, and to not share it with others

## 2022-06-08 ENCOUNTER — TELEMEDICINE (OUTPATIENT)
Dept: BEHAVIORAL/MENTAL HEALTH CLINIC | Facility: CLINIC | Age: 27
End: 2022-06-08
Payer: COMMERCIAL

## 2022-06-08 DIAGNOSIS — F31.12 BIPOLAR 1 DISORDER WITH MODERATE MANIA (HCC): Primary | ICD-10-CM

## 2022-06-08 DIAGNOSIS — F41.1 GENERALIZED ANXIETY DISORDER: Chronic | ICD-10-CM

## 2022-06-08 PROCEDURE — 90832 PSYTX W PT 30 MINUTES: CPT | Performed by: SOCIAL WORKER

## 2022-06-08 NOTE — PSYCH
This note was not shared with the patient due to this is a psychotherapy note  Virtual Regular Visit    Verification of patient location:    Patient is located in the following state in which I hold an active license [place]      Assessment/Plan:    Problem List Items Addressed This Visit        Other    Generalized anxiety disorder (Chronic)    Bipolar 1 disorder with moderate willard ([facility] [address] ) - Primary          Goals addressed in session: Goal 1 and Goal 2          Reason for visit is   Chief Complaint   Patient presents with    Virtual Regular Visit        Encounter provider Lakesha Finnegan    Provider located at [address] [place] [address]  [address]  [facility]  [phone]      Recent Visits  No visits were found meeting these conditions  Showing recent visits within past 7 days and meeting all other requirements  Today's Visits  Date Type Provider Dept   06/08/22 [address] [place] [address] [facility] Jhon   Showing today's visits and meeting all other requirements  Future Appointments  No visits were found meeting these conditions  Showing future appointments within next 150 days and meeting all other requirements       The patient was identified by name and date of birth  Mynor Jacobs was informed that this is a telemedicine visit and that the visit is being conducted through[employer] and patient was informed that this is a secure, HIPAA-compliant platform  She agrees to proceed     My office door was closed  No one else was in the room  She acknowledged consent and understanding of privacy and security of the video platform  The patient has agreed to participate and understands they can discontinue the visit at any time  Patient is aware this is a billable service       Psychotherapy Provided: Individual Psychotherapy 30 minutes     Length of time in session: 30minutes, follow up in 2 week    Encounter Diagnosis     ICD-10-CM    1  Bipolar 1 disorder with moderate willard ([facility] [address] )  F31 12    2  Generalized anxiety disorder  F41 1        Goals addressed in session: Goal 1 and Goal 2     Data: Writer engaged Daniel Ruiz for a virtual psychotherapy session via [employer] platform  [place] reported that she had a healthy communication with her boyfriend about their relationship  [place] reported that she felt the communication went well and the response from her boyfriend was positive in that he was agreeable and motivated toward working through some of the challenges they are facing in their relationship  [place] went on to discuss examples of small changes she has noticed based on her boyfriend's recent actions  [place] reported that she is proud of herself for how well she handled communication as she did not allow emotions to take over  Writer recognized the positive nature of Guevara's report, identifying her effort to create a healthy relationship  Writer spent time exploring and processing Guevara's thoughts and feelings related to her relationship and how this impacts her overall mental health  Writer encouraged continued self-care, healthy coping and communication  Assessment: Daniel Ruiz presented in a neutral mood with congruent affect  Daniel Ruiz was well engaged and oriented X3  Eye contact was good  Speech was of normal rate and tone  Thought content was normal  Insight and judgement were intact  SI/HI not reported or observed during session  Plan: Continue with individual psychotherapy  Current suicide risk : Low     Behavioral Health Treatment Plan [facility]: Diagnosis and Treatment Plan explained to Alla [place] relates understanding diagnosis and is agreeable to Treatment Plan   Yes      I spent 30 minutes directly with the patient during this visit    VIRTUAL VISIT [address] verbally agrees to participate in [facility]  Pt is aware that [employer] could be limited without vital signs or the ability to perform a full hands-on physical [facility] understands she or the provider may request at any time to terminate the video visit and request the patient to seek care or treatment in person

## 2022-06-15 DIAGNOSIS — F90.0 ATTENTION DEFICIT HYPERACTIVITY DISORDER, INATTENTIVE TYPE: ICD-10-CM

## 2022-06-15 RX ORDER — DEXTROAMPHETAMINE SACCHARATE, AMPHETAMINE ASPARTATE MONOHYDRATE, DEXTROAMPHETAMINE SULFATE AND AMPHETAMINE SULFATE 3.75; 3.75; 3.75; 3.75 MG/1; MG/1; MG/1; MG/1
15 CAPSULE, EXTENDED RELEASE ORAL EVERY MORNING
Qty: 30 CAPSULE | Refills: 0 | Status: SHIPPED | OUTPATIENT
Start: 2022-06-15 | End: 2022-07-19 | Stop reason: SDUPTHER

## 2022-06-21 DIAGNOSIS — F41.1 GENERALIZED ANXIETY DISORDER: ICD-10-CM

## 2022-06-21 RX ORDER — ALPRAZOLAM 0.25 MG/1
0.25 TABLET ORAL DAILY
Qty: 30 TABLET | Refills: 0 | Status: SHIPPED | OUTPATIENT
Start: 2022-06-21

## 2022-06-21 NOTE — TELEPHONE ENCOUNTER
----- Message from [address] [place] [address] sent at 6/21/2022 10:00 AM EDT -----  Regarding: refill  ALPRAZolam (XANAX) 0 25 mg tablet ~Take 1 tablet (0 25 mg total) by mouth daily,     [facility], [address] [place] [address] [address]    8/16 LB

## 2022-07-19 DIAGNOSIS — F90.0 ATTENTION DEFICIT HYPERACTIVITY DISORDER, INATTENTIVE TYPE: ICD-10-CM

## 2022-07-19 RX ORDER — DEXTROAMPHETAMINE SACCHARATE, AMPHETAMINE ASPARTATE MONOHYDRATE, DEXTROAMPHETAMINE SULFATE AND AMPHETAMINE SULFATE 3.75; 3.75; 3.75; 3.75 MG/1; MG/1; MG/1; MG/1
15 CAPSULE, EXTENDED RELEASE ORAL EVERY MORNING
Qty: 30 CAPSULE | Refills: 0 | Status: SHIPPED | OUTPATIENT
Start: 2022-07-19 | End: 2022-08-16 | Stop reason: SDUPTHER

## 2022-08-12 ENCOUNTER — SOCIAL WORK (OUTPATIENT)
Dept: BEHAVIORAL/MENTAL HEALTH CLINIC | Facility: CLINIC | Age: 27
End: 2022-08-12
Payer: COMMERCIAL

## 2022-08-12 DIAGNOSIS — F41.1 GENERALIZED ANXIETY DISORDER: Chronic | ICD-10-CM

## 2022-08-12 DIAGNOSIS — F31.9 BIPOLAR I DISORDER, IN FULL REMISSION (HCC): Primary | ICD-10-CM

## 2022-08-12 PROCEDURE — 90834 PSYTX W PT 45 MINUTES: CPT | Performed by: SOCIAL WORKER

## 2022-08-12 NOTE — PSYCH
This note was not shared with the patient due to this is a psychotherapy note     Psychotherapy Provided: Individual Psychotherapy 45 minutes     Length of time in session: 45 minutes, follow up in 2 week    Encounter Diagnosis     ICD-10-CM    1  Bipolar I disorder, in full remission ([facility] [address] )  F31 9    2  Generalized anxiety disorder  F41 1        Goals addressed in session: Goal 1 and Goal 2     Data: Writer met with Navi Baxter for an individual psychotherapy session  Navi Baxter reported that she has been doing well over the past several weeks  Navi Nettlesela reported that she has been working a lot which is why she had to cancel the last few sessions  Grantamauri Baxter reported that she is working pay off some outstanding bills  Grantamauri Baxter discussed her relationship with her significant other, reporting that the relationship has improved and she has been feeling secure in the relationship  Grantamauri Baxter spent time discussing decisions she made for school, as well as potential future career goals  Grantamauri Baxter 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  Writer acknowledged the challenging nature of Guevara's work and how this can impact her on an emotional level  Writer actively listened and spent time processing Amboj's thoughts and feelings  Navi Baxter identified significant improvement in her overall mental health since earlier in the year and expressed relief from distressing emotions  Writer and Grantamauri Baxter discussed continuing to regulate emotions as new stressors arise  Assessment: Navi Baxter presented in an euthymic mood with congruent affect  Navi Baxter was well engaged and oriented X3  Eye contact was good, speech was of normal rate and tone  Thought content was normal, insight and judgement were intact  SI/HI not reported or observed during session  Plan: Continue with individual psychotherapy      Current suicide risk : Low     Behavioral Health Treatment Plan [facility]: Diagnosis and Treatment Plan explained to Alla [place] relates understanding diagnosis and is agreeable to Treatment Plan   Yes

## 2022-08-16 ENCOUNTER — TELEMEDICINE (OUTPATIENT)
Dept: PSYCHIATRY | Facility: CLINIC | Age: 27
End: 2022-08-16
Payer: COMMERCIAL

## 2022-08-16 DIAGNOSIS — F41.1 GENERALIZED ANXIETY DISORDER: ICD-10-CM

## 2022-08-16 DIAGNOSIS — F31.9 BIPOLAR I DISORDER, IN FULL REMISSION (HCC): Primary | ICD-10-CM

## 2022-08-16 DIAGNOSIS — F90.0 ATTENTION DEFICIT HYPERACTIVITY DISORDER, INATTENTIVE TYPE: ICD-10-CM

## 2022-08-16 PROCEDURE — 99214 OFFICE O/P EST MOD 30 MIN: CPT | Performed by: NURSE PRACTITIONER

## 2022-08-16 RX ORDER — DEXTROAMPHETAMINE SACCHARATE, AMPHETAMINE ASPARTATE MONOHYDRATE, DEXTROAMPHETAMINE SULFATE AND AMPHETAMINE SULFATE 3.75; 3.75; 3.75; 3.75 MG/1; MG/1; MG/1; MG/1
15 CAPSULE, EXTENDED RELEASE ORAL EVERY MORNING
Qty: 30 CAPSULE | Refills: 0 | Status: SHIPPED | OUTPATIENT
Start: 2022-08-16 | End: 2022-09-15 | Stop reason: SDUPTHER

## 2022-08-16 NOTE — PSYCH
This note was not shared with the patient due to privacy exception: note includes other individuals    Kaci      Name and Date of Birth:  Dorna Dose 27 y o  1995    Date of Visit: 08/16/22      Dorna Dose was seen today for medication management and brief psychotherapy  Virtual Regular Visit    After connecting through [employer], this patient was identified by name and date of birth, was informed that this is a telemedicine visit, and that it is being conducted through [facility], which this patient was informed is a secure HIPPA-compliant platform; this patient agreed to proceed  During this visit, I was alone in my office and my office door was closed  This patient acknowledged consent and understanding of privacy and security of this video platform  This patient gives consent to continue and knows that She can discontinue the visit at any time  Pt understands that this virtual appointment is a billable service  Pt verifies that She is physically located in [place], the [facility] in which I am licensed as an APN with prescriptive authority        Time spent on psychotherapy:  minutes    Treatment modality:   Medication management   Medication education  Supportive psychotherapy  Encouraged and reinforced adaptive behavior  Stress management  Sleep hygiene  Work-related stress  Coping with grief  Help identify feelings/emotions/life stressors  Explored signs and symptoms of substance abuse  COVID-19 education/vaccination information        SUBJECTIVE: pt reports taking psychiatric medications as prescribed, denies side effects  Doing well, can't complain  Dose increase of Adderall XR to 15 mg qam is very good, stays focused and finishes things  With Suraj Ham has been feeling much better, especially in comparison to Erich and Yash Ge, can cope with things and can calm herself, not feeling emotionally flatter anymore (reported that at her last appointment d/t Liudmila Simeon), her mother and others have said to her that she seems better, and pt feels she is back to being herself again  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  Her union is negotiating a new contract, last time that was happening she had a manic episode because she was afraid of losing her job  Sleeping and eating ok, just started working with a  to get into pre-COVID shape      6/1/2022: taking medication as prescribed, denies side effects  Noticed difference with Latuda, flatter emotionally but it's ok, less anxiety  Maybe feels more toward depressive side but she keeps having people die and is having trouble with her relationship, feels most people would be depressed in her situation   Effect of Adderall has decreased, she finds herself starting things and becoming distracted and going from one task to another without finishing them, has also been procrastinating more; has spoken to Ms Emir Vanegas about it and wonders if a dose increase of Adderall is advisable  Weight hasn't gone up but has maybe only lost 5 lbs, eats in moderation although not the healthiest   Sleep - good, regular  Tito Champ (crystal) is really good in her life, pt takes her to the beach and for walks in a harness  Has part-time job but working full-time hours; 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  Applied to [employer] program  Dose increase: Adderall XR 15 mg one cap po qAM (previous dose: 10 mg qAM)  Continue current medications:               alprazolam 0 25 mg tab 1 tab qd PRN anxiety/panic              Latuda 40 mg tablet - take 1 tablet by mouth every day with food       4/20/2022: taking medications as prescribed, reports weight gain related to aripiprazole  Would like to restart Latuda because she didn't gain weight when she took it, and hasn't been able to lose weight no matter what she does  Feels emotionally well with aripiprazole, but the weight gain is distressing  Eating regularly, watches what she eats, sleep 10 pm to 6 am  May have some junk food in the break room at work but that's three days per week  No breast tenderness  A friend is newly diagnosed with cancer, pt is coping with this in addition to the losses in her life in the past year, her new cat Dot Soto makes a tremendous difference to her and is a great source of comfort   Stop aripiprazole 5 mg tablet   Start: Latuda 40 mg tablet - take without food x 7 days then take with food ongoing  Continue current medications:               Adderall XR 10 mg one cap po qAM              alprazolam 0 25 mg tab 1 tab qd PRN anxiety/panic  Continue to see Ms  Brooke Zafar LCSW for psychotherapy      She denies suicidal ideation, intent or plan at present, has no suicidal ideation, intent or plan at present  She denies any auditory hallucinations and visual hallucinations, denies any other delusional thinking, denies any delusional thinking  She denies any side effects from medications      HPI ROS Appetite Changes and Sleep:   Appetite - normal    Sleep - normal    Review Of Systems:      Constitutional Negative   ENT Negative   Cardiovascular Negative   Respiratory Negative   Gastrointestinal Negative   Genitourinary Negative   Musculoskeletal Negative   Integumentary Negative   Neurological Negative   Endocrine Negative   Other Symptoms Negative and None       Laboratory Results: No results found for this or any previous visit  Substance Abuse History:    Social History     Substance and Sexual Activity   Drug Use Not on file       Family Psychiatric History:     No family history on file      The following portions of the patient's history were reviewed and updated as appropriate: past family history, past medical history, past social history, past surgical history and problem list     Social History     Socioeconomic History    Marital status: Single     Spouse name: Not on file    Number of children: Not on file    Years of education: Not on file    Highest education level: Not on file   Occupational History    Not on file   Tobacco Use    Smoking status: Not on file    Smokeless tobacco: Not on file   Substance and Sexual Activity    Alcohol use: Not on file    Drug use: Not on file    Sexual activity: Not on file   Other Topics Concern    Not on file   Social History Narrative    Not on file     Social Determinants of Health     Financial Resource Strain: Not on file   Food Insecurity: Not on file   Transportation Needs: Not on file   Physical Activity: Not on file   Stress: Not on file   Social Connections: Not on file   Intimate Partner Violence: Not on file   Housing Stability: Not on file     Social History     Social History Narrative    Not on file        Social History     Tobacco History     Smoking Status  Never Assessed    Smokeless Tobacco Use  Unknown          Alcohol History     Alcohol Use Status  Not Asked          Drug Use     Drug Use Status  Not Asked          Sexual Activity     Sexually Active  Not Asked          Activities of Daily Living    Not Asked                     OBJECTIVE:     Mental Status Evaluation:    Appearance age appropriate, casually dressed   Behavior pleasant, cooperative   Speech normal rate, rhythm, volume   Mood Euthymic    Affect Normal range and intensity, appropriate   Thought Processes Organized, goal-directed   Associations intact associations   Thought Content No overt delusions   Perceptual Disturbances: No auditory hallucinations, no visual hallucinations   Abnormal Thoughts  Risk Potential Suicidal ideation - None  Homicidal ideation - None  Potential for aggression - No   Orientation oriented to person, place, date and situation   Memory recent and remote memory grossly intact   Consciousness alert and awake   Attention Span attention span and concentration are age appropriate   Intellect Not formally assessed   Insight intact   Judgement intact    Muscle Strength and  Gait muscle strength and tone were normal, gait not observed   Language no difficulty naming common objects, repeating a phrase   Fund of Knowledge displays adequate knowledge of current events, past history, vocabulary               The patient's chart was reviewed for relevant lab reports and recent encounters with other providers  Medications, treatment progress, and current treatment plan that was enacted on 4/20/2022 and due for review/update on 10/20/2022 were reviewed with the patient  The treatment plan was updated today with the patient who verbally agreed with the updated plan  Today's treatment plan is due for review/update NLT 2/16/2023  Pt and writer were unable to sign plan because signature pad is not working  Treatment plan goals discussed in this encounter: continue to work toward improving control of anxiety        Assessment/Plan:       Diagnoses and all orders for this visit:    Bipolar I disorder, in full remission ([facility] [address] )  -     lurasidone (Latuda) 40 mg tablet; Take 1 tablet (40 mg total) by mouth daily at bedtime    Attention deficit hyperactivity disorder, inattentive type  -     amphetamine-dextroamphetamine (ADDERALL XR, 15MG,) 15 MG 24 hr capsule;  Take 1 capsule (15 mg total) by mouth every morning Max Daily Amount: 15 mg    Generalized anxiety disorder          Treatment Recommendations/Precautions:       Continue current medications:    Adderall XR 15 mg one cap po qAM               alprazolam 0 25 mg tab 1 tab qd PRN anxiety/panic              Latuda 40 mg tablet - take 1 tablet by mouth every day with food     Continue to see Ms Kristi Savageid [facility] for psychotherapy        Risks/Benefits       Risks, Benefits And Possible Side Effects Of Medications:     Risks, benefits, and possible side effects of medications explained to patient and patient verbalizes understanding and agreement for treatment      Controlled Medication Discussion:      DARELL SINGH [facility] website data was reviewed with pt  Discussed with patient the risks of sedation, respiratory depression, impairment of ability to drive and potential for abuse and addiction related to treatment with ALPRAZOLAM  The patient understands risk of treatment with ALPRAZOLAM, agrees to not drive if feeling impaired, to not take it when drinking alcohol, to not request early refills, to take medication as prescribed, and to not share it with others      [facility]ARE[facility] SING[facility] website data was reviewed with pt  Discussed with patient the risks of potential for abuse and addiction related to treatment with ADDERALL   The patient understands risk of treatment with ADDERALL, agrees to not request early refills, to take medication as prescribed, and to not share it with others

## 2022-08-16 NOTE — PATIENT INSTRUCTIONS
Continue current medications:     Adderall XR 15 mg one cap po qAM               alprazolam 0 25 mg tab 1 tab qd PRN anxiety/panic              Latuda 40 mg tablet - take 1 tablet by mouth every day with food     Continue to see Ms Marko Phillips for psychotherapy

## 2022-08-16 NOTE — BH TREATMENT PLAN
TREATMENT PLAN         [facility] dough    Name and Date of Birth:  Gloria Ashton 32 y o  1995    Date of Treatment Plan: August 16, 2022    Diagnosis/Diagnoses:    1  Bipolar I disorder, in full remission ([facility] [address] )    2  Attention deficit hyperactivity disorder, inattentive type    3  Generalized anxiety disorder        Strengths/Personal Resources for Self-Care: taking medications as prescribed, ability to communicate well, general fund of knowledge, good physical health, good understanding of illness, motivation for treatment      Area/Areas of need (in own words): anxiety symptoms  1          Long Term Goal: continue to work toward improving control of anxiety  Target date: 2/16/2023  Person/Persons responsible for completion of goal: shannen Waterman     2          Short Term Objective (s) - How will we reach this goal?:   A  Provider new recommended medication/dosage changes and/or continue medication(s): continue current medications as Adderall, Latuda, Xanax  B  Continue to see Lea Nicholson [facility] for psychotherapy  C  take medications as prescribed, attend scheduled appts  Target date: 2/16/2023  Person/Persons Responsible for Completion of Goal: shannen Waterman     Progress Towards Goals: making good progress     Treatment Modality: medication management every 12 weeks     Review due 120 - 180 days from date of this plan:2/16/2023  Expected length of service: ongoing treatment  My Physician/PA/NP and I have developed this plan together and I agree to work on the goals and objectives  I understand the treatment goals that were developed for my treatment

## 2022-09-15 DIAGNOSIS — F90.0 ATTENTION DEFICIT HYPERACTIVITY DISORDER, INATTENTIVE TYPE: ICD-10-CM

## 2022-09-16 RX ORDER — DEXTROAMPHETAMINE SACCHARATE, AMPHETAMINE ASPARTATE MONOHYDRATE, DEXTROAMPHETAMINE SULFATE AND AMPHETAMINE SULFATE 3.75; 3.75; 3.75; 3.75 MG/1; MG/1; MG/1; MG/1
15 CAPSULE, EXTENDED RELEASE ORAL EVERY MORNING
Qty: 30 CAPSULE | Refills: 0 | Status: SHIPPED | OUTPATIENT
Start: 2022-09-16

## 2022-09-20 ENCOUNTER — TELEPHONE (OUTPATIENT)
Dept: PSYCHIATRY | Facility: CLINIC | Age: 27
End: 2022-09-20

## 2022-09-20 NOTE — TELEPHONE ENCOUNTER
Pt called numerous pharmacies to get her Adderall XR  She got the same answer "It's on back order "    She doesn't know what to do  inhaled corticosteroids there anything else she can be prescribed?   Please advise

## 2022-09-23 DIAGNOSIS — F90.0 ATTENTION DEFICIT HYPERACTIVITY DISORDER, INATTENTIVE TYPE: Primary | ICD-10-CM

## 2022-09-23 RX ORDER — DEXTROAMPHETAMINE SACCHARATE, AMPHETAMINE ASPARTATE MONOHYDRATE, DEXTROAMPHETAMINE SULFATE AND AMPHETAMINE SULFATE 1.25; 1.25; 1.25; 1.25 MG/1; MG/1; MG/1; MG/1
5 CAPSULE, EXTENDED RELEASE ORAL EVERY MORNING
Qty: 30 CAPSULE | Refills: 0 | Status: SHIPPED | OUTPATIENT
Start: 2022-09-23

## 2022-09-23 RX ORDER — DEXTROAMPHETAMINE SACCHARATE, AMPHETAMINE ASPARTATE MONOHYDRATE, DEXTROAMPHETAMINE SULFATE AND AMPHETAMINE SULFATE 2.5; 2.5; 2.5; 2.5 MG/1; MG/1; MG/1; MG/1
10 CAPSULE, EXTENDED RELEASE ORAL EVERY MORNING
Qty: 30 CAPSULE | Refills: 0 | Status: SHIPPED | OUTPATIENT
Start: 2022-09-23

## 2022-09-23 NOTE — PROGRESS NOTES
The PDMP website shows that pt picked up Adderall ER 15 mg caps #30 on 9/19 from Rx written by me on "9/19/2022"  I called pharmacy and questioned this, pharmacist verified that they have been out of the 15 mg caps and that pt did not pick them up  Am sending escripts for XR 5 mg and XR 10 mg now

## 2022-09-23 NOTE — TELEPHONE ENCOUNTER
Spoke to pt to inform her to check if her pharmacy has the 5 mg & 10 mg Adderall in stock  If it is in stock she is to call back to let us know so we can send it to the pharmacy

## 2022-10-07 ENCOUNTER — TELEMEDICINE (OUTPATIENT)
Dept: BEHAVIORAL/MENTAL HEALTH CLINIC | Facility: CLINIC | Age: 27
End: 2022-10-07
Payer: COMMERCIAL

## 2022-10-07 DIAGNOSIS — F31.9 BIPOLAR I DISORDER, IN FULL REMISSION (HCC): Primary | ICD-10-CM

## 2022-10-07 DIAGNOSIS — F41.1 GENERALIZED ANXIETY DISORDER: Chronic | ICD-10-CM

## 2022-10-07 PROCEDURE — 90834 PSYTX W PT 45 MINUTES: CPT | Performed by: SOCIAL WORKER

## 2022-10-07 NOTE — PSYCH
This note was not shared with the patient due to this is a psychotherapy note    Virtual Regular Visit    Verification of patient location:    Patient is located in the following state in which I hold an active license [place]      Assessment/Plan:    Problem List Items Addressed This Visit        Other    Generalized anxiety disorder (Chronic)    Bipolar I disorder, in full remission ([facility] [address] ) - Primary          Goals addressed in session: Goal 1          Reason for visit is   Chief Complaint   Patient presents with    Virtual Regular Visit        Encounter provider Jaye Skaggs    Provider located at [address]  [place] [address]  [phone]      Recent Visits  No visits were found meeting these conditions  Showing recent visits within past 7 days and meeting all other requirements  Today's Visits  Date Type Provider Dept   10/07/22 [address] [place] [address] [facility]   Showing today's visits and meeting all other requirements  Future Appointments  No visits were found meeting these conditions  Showing future appointments within next 150 days and meeting all other requirements       The patient was identified by name and date of birth  Bernardino Merida was informed that this is a telemedicine visit and that the visit is being conducted through[employer] and patient was informed that this is a secure, HIPAA-compliant platform  She agrees to proceed     My office door was closed  No one else was in the room  She acknowledged consent and understanding of privacy and security of the video platform  The patient has agreed to participate and understands they can discontinue the visit at any time  Patient is aware this is a billable service       Psychotherapy Provided: Individual Psychotherapy 45 minutes     Length of time in session: 45 minutes, follow up in 2 week    Encounter Diagnosis     ICD-10-CM    1  Bipolar I disorder, in full remission ([facility] [address] )  F31 9    2  Generalized anxiety disorder  F41 1        Goals addressed in session: Goal 1     Data: Writer engaged [place] virtually, for an individual psychotherapy session  [place] reported that she started fall semester at school and has been adjusting to this new program  Daniel Ruiz discussed the differences she has noticed and how she is making effort to adjust  [place] went on to discuss her relationship with her significant other, reporting that the relationship is the healthiest that she has been in  [place] discussed this in detail, noting the refreshing a positive nature of this relationship  [place] identified some stress related to finances, as well as recent conversation with her siblings  Writer actively listened as Dnaiel Ruiz shared throughout session and assisted by exploring and processing thoughts and feelings  Writer offered emotional validation and utilized CBT to address unhelpful thoughts  Assessment: Daniel Ruiz presented in a euthymic mood with congruent affect  Daniel Ruiz was well engaged and oriented X3  Eye contact was good, speech was of normal rate and tone  Thought content was normal, insight and judgement were intact  [place] offers healthy verbalizations of self-awareness which increases her success with managing mental health challenges and stressors  SI/HI not reported or observed during session  Plan: Continue with individual psychotherapy  Current suicide risk : Low     Behavioral Health Treatment Plan [facility]: Diagnosis and Treatment Plan explained to Alla d'Eliezeroirradha relates understanding diagnosis and is agreeable to Treatment Plan   Yes     I spent 45 minutes directly with the patient during this visit

## 2022-10-21 ENCOUNTER — TELEMEDICINE (OUTPATIENT)
Dept: BEHAVIORAL/MENTAL HEALTH CLINIC | Facility: CLINIC | Age: 27
End: 2022-10-21
Payer: COMMERCIAL

## 2022-10-21 DIAGNOSIS — F31.12 BIPOLAR 1 DISORDER WITH MODERATE MANIA (HCC): Primary | ICD-10-CM

## 2022-10-21 DIAGNOSIS — F41.1 GENERALIZED ANXIETY DISORDER: Chronic | ICD-10-CM

## 2022-10-21 PROCEDURE — 90834 PSYTX W PT 45 MINUTES: CPT | Performed by: SOCIAL WORKER

## 2022-10-21 NOTE — PSYCH
This note was not shared with the patient due to this is a psychotherapy note    Virtual Regular Visit    Verification of patient location:    Patient is located in the following state in which I hold an active license [place]      Assessment/Plan:    Problem List Items Addressed This Visit        Other    Generalized anxiety disorder (Chronic)    Bipolar 1 disorder with moderate willard ([facility] [address] ) - Primary          Goals addressed in session: Goal 1          Reason for visit is   Chief Complaint   Patient presents with   • Virtual Regular Visit        Encounter provider Jo-Anna Tuan    Provider located at [address]  [place] [address]  [phone]      Recent Visits  No visits were found meeting these conditions  Showing recent visits within past 7 days and meeting all other requirements  Today's Visits  Date Type Provider Dept   10/21/22 Telemedicine [facility]uan [facility]   Showing today's visits and meeting all other requirements  Future Appointments  No visits were found meeting these conditions  Showing future appointments within next 150 days and meeting all other requirements       The patient was identified by name and date of birth  Lionel Sigala was informed that this is a telemedicine visit and that the visit is being conducted throughthe [employer] platform  She agrees to proceed     My office door was closed  No one else was in the room  She acknowledged consent and understanding of privacy and security of the video platform  The patient has agreed to participate and understands they can discontinue the visit at any time  Patient is aware this is a billable service  Psychotherapy Provided: Individual Psychotherapy 45 minutes     Length of time in session: 45 minutes, follow up in 2 week    Encounter Diagnosis     ICD-10-CM    1   B[facility] disorder [facility]ard [facility] )  F31 12    2  Generalized anxiety disorder  F41 1        Goals addressed in session: Goal 1     Data: Writer engaged [place], virtually, for an individual psychotherapy session  [place] reported that the past two weeks have been busy for her with work and school  [place] reported that she has recently started a new diet and exercise regimen that has been working well as she has lost a few pound already and gained some insight regarding healthy eating choices  [place] reported that her mood has been stable overall with occasional challenges  [place] discussed some of these more challenging times, identifying her use of self-awareness and healthy coping to navigate emotions such as anger  [place] spent some time reflecting on the challenges and rewards of her job  Writer actively listened and spent time processing Guevara's thoughts and feelings  Writer offered praised toward Guevara's use of healthy coping and self-awareness  Writer encouraged continued efforts toward treatment goals  Assessment: Daniel Ruiz presented in an euthymic mood with congruent affect  Daniel Ruiz was well engaged and oriented X3  Eye contact was good, speech was of normal rate and tone  Thought content was normal; insight and judgement were intact  SI/HI not reported or observed during session  Plan: Continue with individual psychotherapy  Current suicide risk : Low     Behavioral Health Treatment Plan [facility]: Diagnosis and Treatment Plan explained to Alla [place] relates understanding diagnosis and is agreeable to Treatment Plan  Yes      I spent 45 minutes directly with the patient during this visit    TIME IN: 9:10am - client late to session because she lost track of time    TIME OUT: 9:55am  TOTAL SESSION MINS: 45 mins

## 2022-11-04 ENCOUNTER — TELEMEDICINE (OUTPATIENT)
Dept: BEHAVIORAL/MENTAL HEALTH CLINIC | Facility: CLINIC | Age: 27
End: 2022-11-04

## 2022-11-04 DIAGNOSIS — F31.12 BIPOLAR 1 DISORDER WITH MODERATE MANIA (HCC): Primary | ICD-10-CM

## 2022-11-04 DIAGNOSIS — F41.1 GENERALIZED ANXIETY DISORDER: Chronic | ICD-10-CM

## 2022-11-04 NOTE — PSYCH
This note was not shared with the patient due to this is a psychotherapy note    Virtual Regular Visit    Verification of patient location:    Patient is located in the following state in which I hold an active license [place]      Assessment/Plan:    Problem List Items Addressed This Visit        Other    Generalized anxiety disorder (Chronic)    Bipolar 1 disorder with moderate willard ([facility] ) - Primary          Goals addressed in session: Goal 1          Reason for visit is   Chief Complaint   Patient presents with   • Virtual Regular Visit        Encounter provider Angeles Heaton    Provider located at [address]  [place] [address]  [phone]      Recent Visits  No visits were found meeting these conditions  Showing recent visits within past 7 days and meeting all other requirements  Today's Visits  Date Type Provider Dept   11/04/22 [address] [place] [address] [facility]   Showing today's visits and meeting all other requirements  Future Appointments  No visits were found meeting these conditions  Showing future appointments within next 150 days and meeting all other requirements       The patient was identified by name and date of birth  Beryle Ores was informed that this is a telemedicine visit and that the visit is being conducted throughthe [employer] platform  She agrees to proceed     My office door was closed  No one else was in the room  She acknowledged consent and understanding of privacy and security of the video platform  The patient has agreed to participate and understands they can discontinue the visit at any time  Patient is aware this is a billable service  Psychotherapy Provided: Individual Psychotherapy 45 minutes     Length of time in session: 45 minutes, follow up in 2 week    Encounter Diagnosis     ICD-10-CM    1   Bipolar 1 disorder with moderate willard ([facility] )  F31 12    2  Generalized anxiety disorder  F41 1        Goals addressed in session: Goal 1     Data: Writer engaged [place], virtually, for an individual psychotherapy session  [place] reported feeling tired at the time of session as she was up late doing school work  [place] reported that the past two weeks went well overall, but that she did experience increased anxiety and flashbacks one day after spending time with her younger sister  [place] went on to discuss her experience in detail, reporting that she was able to utilize healthy coping and that the symptoms decreased after a few hours  Daniel Ruiz expressed confusion as to the trigger  Writer spent time exploring this with [place], as well as exploring her relationship with her younger sister  [place] went on to discuss positive happenings within her relationship with her significant other and stress at work  Writer actively listened as [place] shared and assisted with processing thoughts and feelings  Assessment: Daniel Ruiz presented in a neutral mood with congruent affect  Daniel Ruiz was engaged and oriented X3  Eye contact was good, speech was of normal rate and tone  Thought content was normal; insight and judgement were intact  SI/HI not reported or observed during session  Plan: Continue with individual psychotherapy  Current suicide risk : Low     Behavioral Health Treatment Plan  [facility]: Diagnosis and Treatment Plan explained to Alla d'[place] relates understanding diagnosis and is agreeable to Treatment Plan   Yes     Visit start and stop times:    11/04/22  Start Time: 0900  Stop Time: 0945  Total Visit Time: 45 minutes

## 2022-11-15 DIAGNOSIS — F90.0 ATTENTION DEFICIT HYPERACTIVITY DISORDER, INATTENTIVE TYPE: ICD-10-CM

## 2022-11-15 RX ORDER — DEXTROAMPHETAMINE SACCHARATE, AMPHETAMINE ASPARTATE MONOHYDRATE, DEXTROAMPHETAMINE SULFATE AND AMPHETAMINE SULFATE 3.75; 3.75; 3.75; 3.75 MG/1; MG/1; MG/1; MG/1
15 CAPSULE, EXTENDED RELEASE ORAL EVERY MORNING
Qty: 30 CAPSULE | Refills: 0 | Status: SHIPPED | OUTPATIENT
Start: 2022-11-15

## 2022-11-15 NOTE — TELEPHONE ENCOUNTER
Patient uses [facility], [address] [place] [address]    Medication Refill Request     Name of Medication Adderall XR 15 mg  Dose/Frequency 1qd am  Quantity 30  Verified pharmacy   [x]  Verified ordering Provider   [x]  Does patient have enough for the next 3 days? Yes [x] No []  Does patient have a follow-up appointment scheduled?  Yes [] No [x]   If so when is appointment:

## 2022-11-18 ENCOUNTER — TELEMEDICINE (OUTPATIENT)
Dept: BEHAVIORAL/MENTAL HEALTH CLINIC | Facility: CLINIC | Age: 27
End: 2022-11-18

## 2022-11-18 DIAGNOSIS — F41.1 GENERALIZED ANXIETY DISORDER: Chronic | ICD-10-CM

## 2022-11-18 DIAGNOSIS — F31.9 BIPOLAR I DISORDER, IN FULL REMISSION (HCC): Primary | ICD-10-CM

## 2022-11-18 NOTE — PSYCH
This note was not shared with the patient due to this is a psychotherapy note    Virtual Regular Visit    Verification of patient location:    Patient is located in the following state in which I hold an active license [place]      Assessment/Plan:    Problem List Items Addressed This Visit        Other    Generalized anxiety disorder (Chronic)    Bipolar I disorder, in full remission ([facility] ) - Primary       Goals addressed in session: Goal 1          Reason for visit is   Chief Complaint   Patient presents with   • Virtual Regular Visit        Encounter provider Janessa Hunter    Provider located at [address]  [address]  [place] [address]  [phone]      Recent Visits  No visits were found meeting these conditions  Showing recent visits within past 7 days and meeting all other requirements  Today's Visits  Date Type Provider Dept   11/18/22 Telemedicine Janessa [facility]   Showing today's visits and meeting all other requirements  Future Appointments  No visits were found meeting these conditions  Showing future appointments within next 150 days and meeting all other requirements       The patient was identified by name and date of birth  Stefany Ney was informed that this is a telemedicine visit and that the visit is being conducted throughthe [employer] platform  She agrees to proceed     My office door was closed  No one else was in the room  She acknowledged consent and understanding of privacy and security of the video platform  The patient has agreed to participate and understands they can discontinue the visit at any time  Patient is aware this is a billable service  Psychotherapy Provided: Individual Psychotherapy 28 minutes     Length of time in session: 28 minutes, follow up in 2 week    Encounter Diagnosis     ICD-10-CM    1   Bipolar I disorder, in full remission ([facility] )  F31 9       2  Generalized anxiety disorder  F41 1           Goals addressed in session: Goal 1     Data: Writer met with Olivia Avilailkarolina, for an individual psychotherapy session  Olivia Ledesma reported that she has been very busy, working extra hours  Olivia Ledesma reported that she has been slightly stressed with school work, but believes she will be able to complete her work in a timely manner  Olivia Ledesma discussed the positive nature of this coursework vs the previous semester  Olivia Callie discussed her relationship with her significant other, noting that the relationship is the healthiest that she has been in and that she sees a future with this person  Oliviasavannah Ledesma shared that she is very thankful  Olivia Ledesma reported that she was invited to spend the holiday with her family but was anxious knowing her biological mother would be there  Olivia Ledesma reported that after consideration, she decided not to go  Writer spent time exploring this with Guevara and processing thoughts and feelings  Writer utilized CBT throughout session  Writer encouraged healthy coping and self-care  Assessment: Olivia Ledesma presented in a neutral mood with congruent affect  Olivia Ledesma was engaged and oriented X3  Speech was of normal rate and tone  Thought content was normal; insight and judgement were intact  SI/HI not reported or observed during session  Plan: Continue with individual psychotherapy  Current suicide risk : Low     [facility]: Diagnosis and Treatment Plan explained to Alla d'Eliezeroirradha relates understanding diagnosis and is agreeable to Treatment Plan  Yes     Visit start and stop times:    11/18/22  Start Time: 0900  Stop Time: 0928  Total Visit Time: 28 minutes Session ended early because Olivia Ledesma had to return to work

## 2022-12-15 DIAGNOSIS — F90.0 ATTENTION DEFICIT HYPERACTIVITY DISORDER, INATTENTIVE TYPE: ICD-10-CM

## 2022-12-15 RX ORDER — DEXTROAMPHETAMINE SACCHARATE, AMPHETAMINE ASPARTATE MONOHYDRATE, DEXTROAMPHETAMINE SULFATE AND AMPHETAMINE SULFATE 3.75; 3.75; 3.75; 3.75 MG/1; MG/1; MG/1; MG/1
15 CAPSULE, EXTENDED RELEASE ORAL EVERY MORNING
Qty: 30 CAPSULE | Refills: 0 | Status: SHIPPED | OUTPATIENT
Start: 2022-12-15

## 2022-12-16 ENCOUNTER — TELEMEDICINE (OUTPATIENT)
Dept: BEHAVIORAL/MENTAL HEALTH CLINIC | Facility: CLINIC | Age: 27
End: 2022-12-16

## 2022-12-16 DIAGNOSIS — F31.9 BIPOLAR I DISORDER, IN FULL REMISSION (HCC): Primary | ICD-10-CM

## 2022-12-16 DIAGNOSIS — F41.1 GENERALIZED ANXIETY DISORDER: Chronic | ICD-10-CM

## 2022-12-16 NOTE — PSYCH
This note was not shared with the patient due to this is a psychotherapy note    Virtual Regular Visit    Verification of patient location:    Patient is located in the following state in which I hold an active license [place]      Assessment/Plan:    Problem List Items Addressed This Visit        Other    Generalized anxiety disorder (Chronic)    Bipolar I disorder, in full remission ([facility] ) - Primary       Goals addressed in session: Goal 1          Reason for visit is   Chief Complaint   Patient presents with   • Virtual Regular Visit        Encounter provider Nettie Freedman    Provider located at [address]  [address]  [place] [address]  [phone]      Recent Visits  No visits were found meeting these conditions  Showing recent visits within past 7 days and meeting all other requirements  Today's Visits  Date Type Provider Dept   12/16/22 Telemedicine Nettie Freedman [facility]   Showing today's visits and meeting all other requirements  Future Appointments  No visits were found meeting these conditions  Showing future appointments within next 150 days and meeting all other requirements       The patient was identified by name and date of birth  Jonathon Moody was informed that this is a telemedicine visit and that the visit is being conducted throughthe [employer] platform  She agrees to proceed     My office door was closed  No one else was in the room  She acknowledged consent and understanding of privacy and security of the video platform  The patient has agreed to participate and understands they can discontinue the visit at any time  Patient is aware this is a billable service  Psychotherapy Provided: Individual Psychotherapy 46 minutes     Length of time in session: 46 minutes, follow up in 2 week    Encounter Diagnosis     ICD-10-CM    1   Bipolar I disorder, in full remission ([facility] )  F31 9       2  Generalized anxiety disorder  F41 1           Goals addressed in session: Goal 1     Data: Writer met with Nuvia Ryderkarolina, for an individual psychotherapy session  Nuvia Ryder reported that she completed her semester at school and is very pleased with her grades  Nuvia Ryder shared that she has been feeling happy overall, and sometimes wonders "when is the ball going to drop " Writer recognized that Nuvia Ryder has been taking care of herself and making healthy decisions which are positively impacting her mental health  Nuvia Ryder spent time discussing work, her relationship with her significant other and her efforts with self-care  Nuvia Ryder reported that she is slightly concerned about fixating on her weight, but is committed to being mindful of that and sticking with making healthier choices  Writer actively listened as Nuvia Ryder shared and spent time processing thoughts and feelings  Writer offered emotional validation throughout session  Writer encouraged continued efforts toward treatment goals  Assessment: Nuvia Ryder presented in an euthymic mood with congruent affect  Nuvia Ryder was well engaged and oriented X3  Eye contact was good, speech was of normal rate and tone  Thought content was normal, insight and judgement were intact  SI/HI not reported or observed during session  Plan: Continue with individual psychotherapy  Current suicide risk : Low     [facility]: Diagnosis and Treatment Plan explained to Alla [place] relates understanding diagnosis and is agreeable to Treatment Plan   Yes     Visit start and stop times:    12/16/22  Start Time: 0902  Stop Time: 4674  Total Visit Time: 46 minutes
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
## 2022-12-29 ENCOUNTER — OFFICE VISIT (OUTPATIENT)
Dept: PSYCHIATRY | Facility: CLINIC | Age: 27
End: 2022-12-29

## 2022-12-29 DIAGNOSIS — F90.0 ATTENTION DEFICIT HYPERACTIVITY DISORDER, INATTENTIVE TYPE: ICD-10-CM

## 2022-12-29 DIAGNOSIS — F31.9 BIPOLAR I DISORDER, IN FULL REMISSION (HCC): Primary | ICD-10-CM

## 2022-12-29 NOTE — BH TREATMENT PLAN
TREATMENT PLAN (Medication Management Only)        [email or web address]    Name and Date of Birth:  Britton Winston 32 y o  1995  Date of Treatment Plan: December 29, 2022  Diagnosis/Diagnoses:    1  Bipolar I disorder, in full remission ([facility] )    2  Attention deficit hyperactivity disorder, inattentive type      Strengths/Personal Resources for Self-Care: taking medications as prescribed, ability to adapt to life changes, ability to communicate needs  Area/Areas of need (in own words): remain stable   1  Long Term Goal: maintain mood stability  Target Date:6 months - 6/29/2023  Person/Persons responsible for completion of goal: Guevara  2  Short Term Objective (s) - How will we reach this goal?:   A  Provider new recommended medication/dosage changes and/or continue medication(s): continue current medications as prescribed  B  N/A   C  N/A  Target Date:6 months - 6/29/2023  Person/Persons Responsible for Completion of Goal: Guevara  Progress Towards Goals: continuing treatment  Treatment Modality: medication management every 3 months  Review due 180 days from date of this plan: 6 months - 6/29/2023  Expected length of service: ongoing treatment  My Physician/PA/NP and I have developed this plan together and I agree to work on the goals and objectives  I understand the treatment goals that were developed for my treatment

## 2022-12-30 ENCOUNTER — TELEMEDICINE (OUTPATIENT)
Dept: BEHAVIORAL/MENTAL HEALTH CLINIC | Facility: CLINIC | Age: 27
End: 2022-12-30

## 2022-12-30 DIAGNOSIS — F90.0 ATTENTION DEFICIT HYPERACTIVITY DISORDER, INATTENTIVE TYPE: ICD-10-CM

## 2022-12-30 DIAGNOSIS — F31.9 BIPOLAR I DISORDER, IN FULL REMISSION (HCC): Primary | ICD-10-CM

## 2022-12-30 NOTE — PSYCH
This note was not shared with the patient due to this is a psychotherapy note    Virtual Regular Visit    Verification of patient location:    Patient is located in the following state in which I hold an active license [place]      Assessment/Plan:    Problem List Items Addressed This Visit        Other    Bipolar I disorder, in full remission ([facility] ) - Primary    Attention deficit hyperactivity disorder, inattentive type       Goals addressed in session: Goal 1 and Goal 2          Reason for visit is   Chief Complaint   Patient presents with   • Virtual Regular Visit        Encounter provider Evorn Handing    Provider located at [address] [place] [address]  [address]  [place] [address]  [phone]      Recent Visits  No visits were found meeting these conditions  Showing recent visits within past 7 days and meeting all other requirements  Today's Visits  Date Type Provider Dept   12/30/22 Telemedicine Evricarda [facility]   Showing today's visits and meeting all other requirements  Future Appointments  No visits were found meeting these conditions  Showing future appointments within next 150 days and meeting all other requirements       The patient was identified by name and date of birth  Chelsyanson Briceño was informed that this is a telemedicine visit and that the visit is being conducted throughthe [employer] platform  She agrees to proceed     My office door was closed  No one else was in the room  She acknowledged consent and understanding of privacy and security of the video platform  The patient has agreed to participate and understands they can discontinue the visit at any time  Patient is aware this is a billable service       Psychotherapy Provided: Individual Psychotherapy 48 minutes     Length of time in session: 48 minutes, follow up in 2 week    Encounter Diagnosis ICD-10-CM    1  Bipolar I disorder, in full remission ([facility] )  F31 9       2  Attention deficit hyperactivity disorder, inattentive type  F90 0           Goals addressed in session: Goal 1 and Goal 2     Data: Writer met with karolina Clark, for an individual psychotherapy session  Daryl Zackary reported that she has been well overall, but working a lot and feeling tired  Daryl Raymundo discussed spending Merle with her boyfriend's family, noting that she enjoyed herself and feels accepted into the family  Daryl Raymundo reported that she would like to be more mindful of her finances and would like to save more in the future  Daryl Raymundo spent time discussing her spending habits and concerns about the potential of having a manic episode in the future and impulsively spending money  Writer processed these thoughts with Daryl Raymudno and discussed preventative actions  Writespike and Daryl Raymundo discussed healthy choices she has been making and the impact this has had on her overall quality of life  Assessment: [place] Moaries presented in an euthymic mood with congruent affect  Daryl Raymundo was well engaged and oriented X3  Eye contact was good, speech was of normal rate and tone  Thought content was normal, insight and judgement were intact  SI/HI not reported or observed during session  Plan: Continue with individual psychotherapy  Current suicide risk : Low     [facility] Treatment [facility]: Diagnosis and Treatment Plan explained to Alla Muro relates understanding diagnosis and is agreeable to Treatment Plan   Yes     Visit start and stop times:    12/30/22  Start Time: 0900  Stop Time: 0948  Total Visit Time: 48 minutes

## 2023-01-12 DIAGNOSIS — F90.0 ATTENTION DEFICIT HYPERACTIVITY DISORDER, INATTENTIVE TYPE: ICD-10-CM

## 2023-01-12 RX ORDER — DEXTROAMPHETAMINE SACCHARATE, AMPHETAMINE ASPARTATE MONOHYDRATE, DEXTROAMPHETAMINE SULFATE AND AMPHETAMINE SULFATE 3.75; 3.75; 3.75; 3.75 MG/1; MG/1; MG/1; MG/1
15 CAPSULE, EXTENDED RELEASE ORAL EVERY MORNING
Qty: 30 CAPSULE | Refills: 0 | Status: SHIPPED | OUTPATIENT
Start: 2023-01-12

## 2023-01-12 NOTE — TELEPHONE ENCOUNTER
Medication Refill Request     Name of Medication Adderall XR 15 mg  Dose/Frequency 1qd am  Quantity 30  Verified pharmacy   [x]  Verified ordering Provider   [x]  Does patient have enough for the next 3 days? Yes [x] No []  Does patient have a follow-up appointment scheduled?  Yes [x] No []   If so when is appointment: 3/16/2023

## 2023-01-13 ENCOUNTER — TELEMEDICINE (OUTPATIENT)
Dept: BEHAVIORAL/MENTAL HEALTH CLINIC | Facility: CLINIC | Age: 28
End: 2023-01-13

## 2023-01-13 DIAGNOSIS — F41.1 GENERALIZED ANXIETY DISORDER: Chronic | ICD-10-CM

## 2023-01-13 DIAGNOSIS — F31.9 BIPOLAR I DISORDER, IN FULL REMISSION (HCC): Primary | ICD-10-CM

## 2023-01-13 NOTE — PSYCH
This note was not shared with the patient due to this is a psychotherapy note    Virtual Regular Visit    Verification of patient location:    Patient is located in the following state in which I hold an active license [place]      Assessment/Plan:    Problem List Items Addressed This Visit        Other    Generalized anxiety disorder (Chronic)    Bipolar I disorder, in full remission ([facility] [address] ) - Primary       Goals addressed in session: Goal 1          Reason for visit is   Chief Complaint   Patient presents with   • Virtual Regular Visit        Encounter provider Melyssa Ren    Provider located at [address]  [address]  [place] [address]  [phone]      Recent Visits  No visits were found meeting these conditions  Showing recent visits within past 7 days and meeting all other requirements  Today's Visits  Date Type Provider Dept   01/13/23 Telemedicine M[facility]ssa Ren [facility]   Showing today's visits and meeting all other requirements  Future Appointments  No visits were found meeting these conditions  Showing future appointments within next 150 days and meeting all other requirements       The patient was identified by name and date of birth  Fara Tamayo was informed that this is a telemedicine visit and that the visit is being conducted throughthe [employer] platform  She agrees to proceed     My office door was closed  No one else was in the room  She acknowledged consent and understanding of privacy and security of the video platform  The patient has agreed to participate and understands they can discontinue the visit at any time  Patient is aware this is a billable service  Psychotherapy Provided: Individual Psychotherapy 43 minutes     Length of time in session: 43 minutes, follow up in 2 week    Encounter Diagnosis     ICD-10-CM    1   B[facility] disorder, [facility] )  F31 9       2  Generalized anxiety disorder  F41 1           Data: Writer met with Natalie Daily, virtually, for an individual psychotherapy session  Natalie Daily reported that she has been well overall  Candida Drivers reported that she has a potentially large decision to make, and went on to share about a job opportunity that she is considering  Natalie Daily shared the details of the job, how it would impact her life and the change it would create  Natalie Daily identified feelings of ambivalence and discomfort with the thought of taking on a new role that she is not familiar with  Writer normalized Guevara's feelings and discussed the positive nature of change and growth  Writer discussed the importance of perspective and self-talk, encouraging Guevara to consider the positive aspects of growth  Natalie Daily went on to discuss her relationship with her significant other and plans for her upcoming 33th birthday  Writer acknowledged the positive nature of Guevara's report and encouraged continued efforts with in healthy living  Assessment: Candida Drivers presented in an euthymic mood with congruent affect  Natalie Daily was engaged and oriented X3  Eye contact was good, speech was of normal rate and tone  Thought content was normal; insight and judgement were intact  SI/HI not reported or observed during session  Plan: Continue with individual psychotherapy  Current suicide risk : Low     Behavioral Health Treatment Plan [place]: Diagnosis and Treatment Plan explained to Alla [place] relates understanding diagnosis and is agreeable to Treatment Plan   Yes     Visit start and stop times:    01/13/23  Start Time: 0904  Stop Time: 9826  Total Visit Time: 43 minutes

## 2023-01-27 ENCOUNTER — TELEMEDICINE (OUTPATIENT)
Dept: BEHAVIORAL/MENTAL HEALTH CLINIC | Facility: CLINIC | Age: 28
End: 2023-01-27

## 2023-01-27 DIAGNOSIS — F41.1 GENERALIZED ANXIETY DISORDER: Chronic | ICD-10-CM

## 2023-01-27 DIAGNOSIS — F31.12 BIPOLAR 1 DISORDER WITH MODERATE MANIA (HCC): Primary | ICD-10-CM

## 2023-01-27 NOTE — PSYCH
This note was not shared with the patient due to this is a psychotherapy note    Virtual Regular Visit    Verification of patient location:    Patient is located in the following state in which I hold an active license [place]      Assessment/Plan:    Problem List Items Addressed This Visit        Other    Generalized anxiety disorder (Chronic)    Bipolar 1 disorder with moderate willard ([facility] [address] ) - Primary       Goals addressed in session: Goal 1          Reason for visit is   Chief Complaint   Patient presents with   • Virtual Regular Visit        Encounter provider [employer]    Provider located at [address]  [address]  [place] [address]  [phone]      Recent Visits  No visits were found meeting these conditions  Showing recent visits within past 7 days and meeting all other requirements  Today's Visits  Date Type Provider Dept   01/27/23 Telemedicine [facility]   Showing today's visits and meeting all other requirements  Future Appointments  No visits were found meeting these conditions  Showing future appointments within next 150 days and meeting all other requirements       The patient was identified by name and date of birth  Hyacinth Garza was informed that this is a telemedicine visit and that the visit is being conducted throughthe [employer] platform  She agrees to proceed     My office door was closed  No one else was in the room  She acknowledged consent and understanding of privacy and security of the video platform  The patient has agreed to participate and understands they can discontinue the visit at any time  Patient is aware this is a billable service  Behavioral Health Psychotherapy Progress Note    Psychotherapy Provided: Individual Psychotherapy     1  Bipolar 1 disorder with moderate willard ([facility] [address] )        2   Generalized anxiety disorder            Goals addressed in session: Goal 1     DATA: Writer engaged [place], virtually, for an individual psychotherapy session  [place] reported that she has been experiencing increased anxiety over the past week, mostly related to school work  Daniel Ruiz reported that she has been procrastinating with her work and is now feeling overwhelmed  Daniel Ruiz also reported that she has noticed the return of nail biting in the middle of the night  Daniel Ruiz shared that she did this in the past, and does not realize that she is doing it when it happens in the night, but wakes up to see that she bit her nails  Writer spent time exploring this with [place] and discussed this as a possible stress/anxiety response  [place] went on to discuss other stressors, including information she learned from a past relationship that was hurtful  Daniel Sara was able to identify positive changes that she has made in her life since that hurtful time  Writer actively listened as [place] shared and assisted with processing thoughts and feelings   and [place] discussed organization and scheduling with her school work to assist with decreasing stress and anxiety  During this session, this clinician used the following therapeutic modalities: Cognitive Behavioral Therapy    Substance Abuse was not addressed during this session  If the client is diagnosed with a co-occurring substance use disorder, please indicate any changes in the frequency or amount of use: NA  Stage of change for addressing substance use diagnoses: No substance use/Not applicable    ASSESSMENT:  Mu Wiggins presents with a Euthymic/ normal mood  her affect is Normal range and intensity, which is congruent, with her mood and the content of the session  The client has made progress on their goals  Daniel Sara was well engaged and oriented X3  Eye contact was good, speech was of normal rate and tone   Thought content was normal; insight and judgement were intact  Daryl Blue presents with a none risk of suicide, none risk of self-harm, and none risk of harm to others  For any risk assessment that surpasses a "low" rating, a safety plan must be developed  A safety plan was indicated: no  If yes, describe in detail NA    PLAN: Between sessions, Daryl Blue will work on organizing her school assignments  At the next session, the therapist will use Cognitive Behavioral Therapy to address anxiety  Behavioral Health Treatment Plan and Discharge Planning: Daryl Blue is aware of and agrees to continue to work on their treatment plan  They have identified and are working toward their discharge goals   yes    Visit start and stop times:    01/27/23  Start Time: 0900  Stop Time: 0945  Total Visit Time: 45 minutes

## 2023-02-10 ENCOUNTER — TELEMEDICINE (OUTPATIENT)
Dept: BEHAVIORAL/MENTAL HEALTH CLINIC | Facility: CLINIC | Age: 28
End: 2023-02-10

## 2023-02-10 DIAGNOSIS — F31.9 BIPOLAR I DISORDER, IN FULL REMISSION (HCC): Primary | ICD-10-CM

## 2023-02-10 DIAGNOSIS — F41.1 GENERALIZED ANXIETY DISORDER: Chronic | ICD-10-CM

## 2023-02-10 NOTE — PSYCH
This note was not shared with the patient due to this is a psychotherapy note    Virtual Regular Visit    Verification of patient location:    Patient is located in the following state in which I hold an active license [place]      Assessment/Plan:    Problem List Items Addressed This Visit        Other    Generalized anxiety disorder (Chronic)    Bipolar I disorder, in full remission ([facility] [address] ) - Primary       Goals addressed in session: Goal 1          Reason for visit is   Chief Complaint   Patient presents with   • Virtual Regular Visit        Encounter provider Gayathri Barfield    Provider located at [address]  [address]  [place] [address]  [phone]      Recent Visits  No visits were found meeting these conditions  Showing recent visits within past 7 days and meeting all other requirements  Today's Visits  Date Type Provider Dept   02/10/23 Telemedicine Gayathri Barfield [facility]   Showing today's visits and meeting all other requirements  Future Appointments  No visits were found meeting these conditions  Showing future appointments within next 150 days and meeting all other requirements       The patient was identified by name and date of birth  Dung Ku was informed that this is a telemedicine visit and that the visit is being conducted throughthe [email or web address] platform  She agrees to proceed     My office door was closed  No one else was in the room  She acknowledged consent and understanding of privacy and security of the video platform  The patient has agreed to participate and understands they can discontinue the visit at any time  Patient is aware this is a billable service  Behavioral Health Psychotherapy Progress Note    Psychotherapy Provided: Individual Psychotherapy     1  Bipolar I disorder, in full remission ([facility] [address] )        2   Generalized anxiety disorder            Goals addressed in session: Goal 1     DATA: Writer engaged [place], virtually, for an individual psychotherapy session  Daniel Ruiz spent time discussing happenings at her work, both positive and some stressful  [place] identified challenges within her schooling as well, and worked through how she is able to navigate this going forward  [place] identified that she would like to explore an impulse she noticed of having to eat any food that is set in front of her  [place] reported that she did consider that there were times in her childhood that she did not have access to food, but is unsure if this is the root cause of the impulsivity  Writer agreed to continue exploring this with [place] to help increase insight  Writer actively listened throughout session and assisted [place] with processing thoughts and feelings  Writer utilize strengths based communication to help encourage and motivation [place] to continue with her healthy efforts  During this session, this clinician used the following therapeutic modalities: Cognitive Behavioral Therapy  NA  Substance Abuse was not addressed during this session  If the client is diagnosed with a co-occurring substance use disorder, please indicate any changes in the frequency or amount of use: NA  Stage of change for addressing substance use diagnoses: No substance use/Not applicable    ASSESSMENT:  Hyacinth Garza presents with a Euthymic/ normal mood  her affect is Normal range and intensity, which is congruent, with her mood and the content of the session  The client has made progress on their goals  Daniel Ruiz was engaged and oriented X3  Eye contact was good, speech was of normal rate and tone  Thought content was normal; insight and judgement were intact  Hyacinth Garza presents with a none risk of suicide, none risk of self-harm, and none risk of harm to others      For any risk assessment that surpasses a "low" rating, a safety plan must be developed  A safety plan was indicated: no  If yes, describe in detail NA    PLAN: Between sessions, Natalie Reusing will utilize healthy coping and time management    At the next session, the therapist will use Cognitive Behavioral Therapy to address stress  Behavioral Health Treatment Plan and Discharge Planning: Natalie Reusing is aware of and agrees to continue to work on their treatment plan  They have identified and are working toward their discharge goals   yes    Visit start and stop times:    02/10/23  Start Time: 0900  Stop Time: 0949  Total Visit Time: 49 minutes

## 2023-02-16 DIAGNOSIS — F90.0 ATTENTION DEFICIT HYPERACTIVITY DISORDER, INATTENTIVE TYPE: ICD-10-CM

## 2023-02-16 RX ORDER — DEXTROAMPHETAMINE SACCHARATE, AMPHETAMINE ASPARTATE MONOHYDRATE, DEXTROAMPHETAMINE SULFATE AND AMPHETAMINE SULFATE 3.75; 3.75; 3.75; 3.75 MG/1; MG/1; MG/1; MG/1
15 CAPSULE, EXTENDED RELEASE ORAL EVERY MORNING
Qty: 30 CAPSULE | Refills: 0 | Status: SHIPPED | OUTPATIENT
Start: 2023-02-16 | End: 2023-02-23

## 2023-02-16 NOTE — TELEPHONE ENCOUNTER
Medication Refill Request     Name of Medication amphetamine-dextroamphetamine (ADDERALL XR, 15MG,) 15 MG 24 hr capsule  Dose/Frequency Take 1 capsule (15 mg total) by mouth every morning Max Daily Amount: 15 mg  Quantity 30  Verified pharmacy   [x]  Verified ordering Provider   [x]  Does patient have enough for the next 3 days? Yes [x] No []  Does patient have a follow-up appointment scheduled?  Yes [x] No []   If so when is appointment: 03/16/23 @ 11 AM

## 2023-02-20 DIAGNOSIS — F90.0 ATTENTION DEFICIT HYPERACTIVITY DISORDER, INATTENTIVE TYPE: ICD-10-CM

## 2023-02-20 RX ORDER — DEXTROAMPHETAMINE SACCHARATE, AMPHETAMINE ASPARTATE MONOHYDRATE, DEXTROAMPHETAMINE SULFATE AND AMPHETAMINE SULFATE 3.75; 3.75; 3.75; 3.75 MG/1; MG/1; MG/1; MG/1
15 CAPSULE, EXTENDED RELEASE ORAL EVERY MORNING
Qty: 30 CAPSULE | Refills: 0 | OUTPATIENT
Start: 2023-02-20

## 2023-02-20 NOTE — TELEPHONE ENCOUNTER
Placed an outgoing call to patient to let her know that Valarie Mahajan denied the refill  Per patient she states that she didn't receive any refill of adderall xr 15mg on 2/16/2023  Patient is going to call pharmacy and clarify to see what is going on  Patient will return a call with new information when she gets this sorts out

## 2023-02-20 NOTE — TELEPHONE ENCOUNTER
Pharmacy has Adderall 5mg XR and Adderall 10 mg XR in stock  Her regular dosage is out of stock and on back order  Verified pharmacy    Patient is in need of refill

## 2023-02-21 NOTE — TELEPHONE ENCOUNTER
Pt called back saying that Renetta Smith has to call pharmacy, but I explain to her that the pharmacy has to fixed the issue with the database saying that she picked up her medication already, and there is nothing we can do about it

## 2023-02-23 ENCOUNTER — TELEPHONE (OUTPATIENT)
Dept: PSYCHIATRY | Facility: CLINIC | Age: 28
End: 2023-02-23

## 2023-02-23 DIAGNOSIS — F90.0 ATTENTION DEFICIT HYPERACTIVITY DISORDER, INATTENTIVE TYPE: Primary | ICD-10-CM

## 2023-02-23 RX ORDER — DEXTROAMPHETAMINE SACCHARATE, AMPHETAMINE ASPARTATE, DEXTROAMPHETAMINE SULFATE AND AMPHETAMINE SULFATE 2.5; 2.5; 2.5; 2.5 MG/1; MG/1; MG/1; MG/1
10 TABLET ORAL
Qty: 60 TABLET | Refills: 0 | Status: SHIPPED | OUTPATIENT
Start: 2023-02-23 | End: 2023-03-21 | Stop reason: SDUPTHER

## 2023-02-23 NOTE — TELEPHONE ENCOUNTER
Pt has not been able to get her Adderall for over a week. 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 She wanted to know if you have a spare moment if you could give her a call to go over some options

## 2023-02-24 ENCOUNTER — TELEMEDICINE (OUTPATIENT)
Dept: BEHAVIORAL/MENTAL HEALTH CLINIC | Facility: CLINIC | Age: 28
End: 2023-02-24

## 2023-02-24 DIAGNOSIS — F31.9 BIPOLAR I DISORDER, IN FULL REMISSION (HCC): Primary | ICD-10-CM

## 2023-02-24 DIAGNOSIS — F41.1 GENERALIZED ANXIETY DISORDER: Chronic | ICD-10-CM

## 2023-02-24 NOTE — PSYCH
This note was not shared with the patient due to this is a psychotherapy note    Virtual Regular Visit    Verification of patient location:    Patient is located in the following state in which I hold an active license [place]      Assessment/Plan:    Problem List Items Addressed This Visit        Other    Generalized anxiety disorder (Chronic)    Bipolar I disorder, in full remission ([facility] [address] ) - Primary       Goals addressed in session: Goal 2          Reason for visit is   Chief Complaint   Patient presents with   • Virtual Regular Visit        Encounter provider Dany Rodriguez    Provider located at [place] [address]  [phone]      Recent Visits  No visits were found meeting these conditions  Showing recent visits within past 7 days and meeting all other requirements  Today's Visits  Date Type Provider Dept   02/24/23 Telemedicine Dany Rodriguez [facility]   Showing today's visits and meeting all other requirements  Future Appointments  No visits were found meeting these conditions  Showing future appointments within next 150 days and meeting all other requirements       The patient was identified by name and date of birth  Evelyn Lubin was informed that this is a telemedicine visit and that the visit is being conducted throughthe [employer] platform  She agrees to proceed     My office door was closed  No one else was in the room  She acknowledged consent and understanding of privacy and security of the video platform  The patient has agreed to participate and understands they can discontinue the visit at any time  Patient is aware this is a billable service  Behavioral Health Psychotherapy Progress Note    Psychotherapy Provided: Individual Psychotherapy     1  Bipolar I disorder, in full remission ([facility] [address] )        2   Generalized anxiety disorder            Goals addressed in session: Goal 2     DATA: Writer met with Radha Waldron for an individual psychotherapy session  Radha Chivo reported that she worked the past few days and is feeling exhausted  Radha Waldron shared that she interviewed for a new position at work and is having difficulty deciding if this opportunity is the best option for her at this time  Writer spent time exploring Guevara's thoughts regarding this new position  Writer and Radha Waldron weighed pros and cons  Radha Chivo reported that she plans to speak to someone who was in this position in the past to gain more insight  Radha Chivo went on to 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  Radha Waldron shared the details of what took place, and became tearful as she expressed the intense sadness of what she experienced  Writer actively listened and offered emotional validation  Writer spent time processing thoughts and feelings  Writer utilized positive reinforcement throughout session to emphasize and encouraged the positive behaviors taken by Radha Waldron throughout stress  Writer encouraged self-care and healthy coping  During this session, this clinician used the following therapeutic modalities: Cognitive Behavioral Therapy    Substance Abuse was not addressed during this session  If the client is diagnosed with a co-occurring substance use disorder, please indicate any changes in the frequency or amount of use: NA  Stage of change for addressing substance use diagnoses: No substance use/Not applicable    ASSESSMENT:  Kyra Garcia presents with a Euthymic/ normal mood  her affect is Normal range and intensity, which is congruent, with her mood and the content of the session  The client has made progress on their goals  Radha Waldron was oriented X3 and well engaged  Eye contact was good, speech was of normal rate and tone  Thought content was normal; insight and judgement were intact   Radha Waldron Ronak Simpson presents with a none risk of suicide, none risk of self-harm, and none risk of harm to others  For any risk assessment that surpasses a "low" rating, a safety plan must be developed  A safety plan was indicated: no  If yes, describe in detail NA    PLAN: Between sessions, Ines Katz will practice self-care and healthy coping  At the next session, the therapist will use Cognitive Behavioral Therapy to address stress  Behavioral Health Treatment Plan and Discharge Planning: Ines Katz is aware of and agrees to continue to work on their treatment plan  They have identified and are working toward their discharge goals   yes    Visit start and stop times:    02/24/23  Start Time: 0900  Stop Time: 0946  Total Visit Time: 46 minutes

## 2023-03-10 ENCOUNTER — TELEMEDICINE (OUTPATIENT)
Dept: BEHAVIORAL/MENTAL HEALTH CLINIC | Facility: CLINIC | Age: 28
End: 2023-03-10

## 2023-03-10 DIAGNOSIS — F41.1 GENERALIZED ANXIETY DISORDER: Chronic | ICD-10-CM

## 2023-03-10 DIAGNOSIS — F31.9 BIPOLAR I DISORDER, IN FULL REMISSION (HCC): Primary | ICD-10-CM

## 2023-03-10 NOTE — PSYCH
This note was not shared with the patient due to this is a psychotherapy note    Virtual Regular Visit    Verification of patient location:    Patient is located in the following state in which I hold an active license [place]      Assessment/Plan:    Problem List Items Addressed This Visit        Other    Generalized anxiety disorder (Chronic)    B[facility] in full [facility] - Primary       Goals addressed in session: Goal 1          Reason for visit is   Chief Complaint   Patient presents with   • Virtual Regular Visit        Encounter provider Anthony Kennedy    Provider located at [address]  [address]  [place] [address]  [phone]      Recent Visits  No visits were found meeting these conditions  Showing recent visits within past 7 days and meeting all other requirements  Today's Visits  Date Type Provider Dept   03/10/23 Telemedicine Anthony Kennedy [facility]   Showing today's visits and meeting all other requirements  Future Appointments  No visits were found meeting these conditions  Showing future appointments within next 150 days and meeting all other requirements       The patient was identified by name and date of birth  Serenitydana Corbett was informed that this is a telemedicine visit and that the visit is being conducted throughthe [employer] platform  She agrees to proceed     My office door was closed  No one else was in the room  She acknowledged consent and understanding of privacy and security of the video platform  The patient has agreed to participate and understands they can discontinue the visit at any time  Patient is aware this is a billable service  Behavioral Health Psychotherapy Progress Note    Psychotherapy Provided: Individual Psychotherapy     1  Bipolar I disorder, in full remission ([facility] [address] )        2   Generalized anxiety disorder            Goals addressed in session: Goal 1     DATA: Writer met with karolina Culp, for an individual psychotherapy session  Xi Lorenzana reported that the past two weeks have been stressful  Xi Lorenzana spent time sharing about stress with her mentor at school as she thought she was doing well but the mentor thought otherwise  Xi Lorenzana noted increased anxiety in response to this but identified that she was able to cope and manage the anxiety  Xi Lorenzana reported that she decided not to move forward with the job she was interviewing for as she realized that it was not fitting for where she is in life at this time  Writer recognized the healthy decision making process used by Xi Lorenzana to make this decision  Xi Lorenzana discussed other stressors at work and in her personal life  Writer actively listened as Xi Lorenzana shared throughout session and assisted with processing thoughts and feelings  Writer offered emotional validation and emphasized reports of healthy thoughts/behaviors  During this session, this clinician used the following therapeutic modalities: Cognitive Behavioral Therapy    Substance Abuse was not addressed during this session  If the client is diagnosed with a co-occurring substance use disorder, please indicate any changes in the frequency or amount of use: Na  Stage of change for addressing substance use diagnoses: No substance use/Not applicable    ASSESSMENT:  Gavin Lora presents with a Euthymic/ normal mood  her affect is Normal range and intensity, which is congruent, with her mood and the content of the session  The client has made progress on their goals  Xi Lorenzana was oriented X3 and engaged  Eye contact was good, speech was of normal rate and tone  Thought content was normal; insight and judgement were intact  Gavin Lora presents with a none risk of suicide, none risk of self-harm, and none risk of harm to others      For any risk assessment that surpasses a "low" rating, a safety plan must be developed  A safety plan was indicated: no  If yes, describe in detail NA    PLAN: Between sessions, Steve ePrez will utilize self-care and healthy coping to reduce stress and anxiety  At the next session, the therapist will use Cognitive Behavioral Therapy to address mood/anxiety  Behavioral Health Treatment Plan and Discharge Planning: Steve Perez is aware of and agrees to continue to work on their treatment plan  They have identified and are working toward their discharge goals   yes    Visit start and stop times:    03/10/23  Start Time: 0900  Stop Time: 0950  Total Visit Time: 50 minutes

## 2023-03-14 DIAGNOSIS — F31.9 BIPOLAR I DISORDER, IN FULL REMISSION (HCC): ICD-10-CM

## 2023-03-14 NOTE — TELEPHONE ENCOUNTER
Medication Refill Request     Name of Medication lurasidone (Latuda) 40 mg tablet  Dose/Frequency Take 1 tablet (40 mg total) by mouth daily at bedtime  Quantity 30  Verified pharmacy   [x]  Verified ordering Provider   [x]  Does patient have enough for the next 3 days? Yes [x] No []  Does patient have a follow-up appointment scheduled? Yes [x] No []   If so when is appointment: 03/16/23 @ 11 AM

## 2023-03-14 NOTE — PSYCH
This note was not shared with the patient due to reasonable likelihood of causing patient harm    Virtual Regular Visit    Problem List Items Addressed This Visit        Other    Generalized anxiety disorder (Chronic)    Bipolar I disorder, in full remission ([facility] [address] ) - Primary    Attention deficit hyperactivity disorder, inattentive type     Reason for visit is   Chief Complaint   Patient presents with   • Medication Management   • Follow-up     Encounter provider Machelle Shannon PA-C    Provider located at [address] [place] [address] [address] [place] [address]  [address]  [place] [address]  [phone]    Recent Visits  No visits were found meeting these conditions  Showing recent visits within past 7 days and meeting all other requirements  Today's Visits  Date Type Provider Dept   03/16/23 Telemedicine Machelle Shannon PA-C  [facility]   Showing today's visits and meeting all other requirements  Future Appointments  No visits were found meeting these conditions  Showing future appointments within next 150 days and meeting all other requirements       After connecting through [email or web address], the patient was identified by name and date of birth  Heena Benavidez was informed that this is a telemedicine visit and that the visit is being conducted through the [address] [employer] platform  She agrees to proceed  My office door was closed  No one else was in the room  She acknowledged consent and understanding of privacy and security of the video platform  The patient has agreed to participate and understands they can discontinue the visit at any time  SUBJECTIVE:    Heena Benavidez is a 32 y o  female with a history of bipolar disorder and ADHD who presents for virtual follow-up today  She reports that she has been busy with school and work  She shares that at times school has been overwhelming for her    Over the last couple of weeks she reports a more depressed mood than usual   She notes that she is still completing her ADLs but she has been putting off some household chores  She denies any symptoms of willard  She reports that she has been "biting my nails in my sleep "  She reports that the last time she has done this was a time period of extreme stress  At this time she would like to use her coping skills and her therapeutic techniques to help with her increase in depression and anxiety  She also plans to increase her activity level  She denies any current suicidal or homicidal thought, plan, or intent  No auditory or visual hallucinations  No delusions  No medication side effects  HPI ROS Appetite Changes and Sleep: adequate sleep and appetite    Review Of Systems:     Constitutional Negative   ENT Negative   Cardiovascular Negative   Respiratory Negative   Gastrointestinal Negative   Genitourinary Negative   Musculoskeletal Negative   Integumentary Negative   Neurological Negative   Endocrine Negative   Other Symptoms Negative and None          Substance Abuse History:    Social History     Substance and Sexual Activity   Drug Use Not on file       Family Psychiatric History:     No family history on file      Social History     Socioeconomic History   • Marital status: Single     Spouse name: Not on file   • Number of children: Not on file   • Years of education: Not on file   • Highest education level: Not on file   Occupational History   • Not on file   Tobacco Use   • Smoking status: Not on file   • Smokeless tobacco: Not on file   Substance and Sexual Activity   • Alcohol use: Not on file   • Drug use: Not on file   • Sexual activity: Not on file   Other Topics Concern   • Not on file   Social History Narrative   • Not on file     Social Determinants of Health     Financial Resource Strain: Not on file   Food Insecurity: Not on file   Transportation Needs: Not on file   Physical Activity: Not on file   Stress: Not on file   Social Connections: Not on file   Intimate Partner Violence: Not on file   Housing Stability: Not on file       No past medical history on file  No past surgical history on file  Current Outpatient Medications   Medication Sig Dispense Refill   • ALPRAZolam (XANAX) 0 25 mg tablet Take 1 tablet (0 25 mg total) by mouth daily 30 tablet 0   • amphetamine-dextroamphetamine (ADDERALL, 10MG,) 10 mg tablet Take 1 tablet (10 mg total) by mouth 2 (two) times a day Max Daily Amount: 20 mg 60 tablet 0   • Aubra EQ 0 1-20 MG-MCG per tablet      • cyclobenzaprine (FLEXERIL) 5 mg tablet TAKE 1 TABLET BY MOUTH DAILY FOR MUSCLE SPASM     • EPINEPHrine (EPIPEN) 0 3 mg/0 3 mL SOAJ Inject as directed     • lurasidone (Latuda) 40 mg tablet Take 1 tablet (40 mg total) by mouth daily at bedtime 30 tablet 2   • meloxicam (MOBIC) 15 mg tablet TAKE 1 TABLET BY MOUTH DAILY FOR 10 DAYS     • Multiple Vitamin (MULTI-VITAMIN DAILY PO) Take by mouth       No current facility-administered medications for this visit          Allergies   Allergen Reactions   • Cashew Nut Oil - Food Allergy      Allergic to cashews           OBJECTIVE:     Mental Status Examination:  Appearance age appropriate, casually dressed   Behavior pleasant, cooperative   Speech normal volume, normal pitch   Mood Euthymic   Affect Mood Congruent    Thought Processes logical   Associations intact associations   Thought Content normal   Perceptual Disturbances: none   Abnormal Thoughts  Risk Potential Suicidal ideation - None  Homicidal ideation - None  Potential for aggression - No   Orientation oriented to person, place, time/date and situation   Memory recent and remote memory grossly intact   Cosciousness alert and awake   Attention Span attention span and concentration are age appropriate   Intellect Appears to be of Average Intelligence   Insight age appropriate    Judgement good    Muscle Strength and  Gait muscle strength and tone were normal   Language no difficulty naming common objects   Fund of Knowledge displays adequate knowledge of current events   Pain none   Pain Scale 0          Laboratory Results: No results found for this or any previous visit  Assessment/Plan:       Diagnoses and all orders for this visit:    Bipolar I disorder, in full remission ([facility] [address] )    Generalized anxiety disorder    Attention deficit hyperactivity disorder, inattentive type          Treatment Recommendations- Risks Benefits      Continue current medications:     Latuda 40 mg daily for mood  Adderall 10 mg BID for ADHD (changed from Adderall XR 15 mg due to shortage)  Xanax 0 25 mg daily as needed for extreme anxiety     She will follow-up in 3 months  She is aware to call the office if questions or concerns arise sooner  She is aware of emergent and nonemergent mental health resources  She will continue with her psychotherapist within this office every other week  Risks, Benefits And Possible Side Effects Of Medications:  Discussed    Controlled Medication Discussion: PDMP reviewed- no red flags    Psychotherapy Provided: No    Treatment Plan:    Completed and signed during the session: Not applicable - Treatment Plan not due at this session    I spent 16 minutes with the patient during this visit      Suma Fried PA-C 03/16/23

## 2023-03-15 RX ORDER — LURASIDONE HYDROCHLORIDE 40 MG/1
40 TABLET, FILM COATED ORAL
Qty: 30 TABLET | Refills: 2 | Status: SHIPPED | OUTPATIENT
Start: 2023-03-15 | End: 2023-06-19 | Stop reason: SDUPTHER

## 2023-03-16 ENCOUNTER — TELEMEDICINE (OUTPATIENT)
Dept: PSYCHIATRY | Facility: CLINIC | Age: 28
End: 2023-03-16

## 2023-03-16 DIAGNOSIS — F41.1 GENERALIZED ANXIETY DISORDER: ICD-10-CM

## 2023-03-16 DIAGNOSIS — F90.0 ATTENTION DEFICIT HYPERACTIVITY DISORDER, INATTENTIVE TYPE: ICD-10-CM

## 2023-03-16 DIAGNOSIS — F31.9 BIPOLAR I DISORDER, IN FULL REMISSION (HCC): Primary | ICD-10-CM

## 2023-03-21 DIAGNOSIS — F90.0 ATTENTION DEFICIT HYPERACTIVITY DISORDER, INATTENTIVE TYPE: ICD-10-CM

## 2023-03-21 RX ORDER — DEXTROAMPHETAMINE SACCHARATE, AMPHETAMINE ASPARTATE, DEXTROAMPHETAMINE SULFATE AND AMPHETAMINE SULFATE 2.5; 2.5; 2.5; 2.5 MG/1; MG/1; MG/1; MG/1
10 TABLET ORAL
Qty: 60 TABLET | Refills: 0 | Status: SHIPPED | OUTPATIENT
Start: 2023-03-21 | End: 2023-03-28

## 2023-03-21 NOTE — TELEPHONE ENCOUNTER
Medication Refill Request     Name of Medication Adderall 10 mg  Dose/Frequency 1bid   Quantity 60  Verified pharmacy   [x]  Verified ordering Provider   [x]  Does patient have enough for the next 3 days? Yes [] No [x]  Does patient have a follow-up appointment scheduled?  Yes [x] No []   If so when is appointment: 6/28/2023

## 2023-03-28 DIAGNOSIS — F90.0 ATTENTION DEFICIT HYPERACTIVITY DISORDER, INATTENTIVE TYPE: Primary | ICD-10-CM

## 2023-03-28 RX ORDER — DEXTROAMPHETAMINE SACCHARATE, AMPHETAMINE ASPARTATE, DEXTROAMPHETAMINE SULFATE AND AMPHETAMINE SULFATE 1.25; 1.25; 1.25; 1.25 MG/1; MG/1; MG/1; MG/1
10 TABLET ORAL 2 TIMES DAILY
Qty: 120 TABLET | Refills: 0 | Status: SHIPPED | OUTPATIENT
Start: 2023-03-28 | End: 2023-04-27

## 2023-04-03 NOTE — PSYCH
This note was not shared with the patient due to reasonable likelihood of causing patient harm    PROGRESS NOTE        [facility]      Name and Date of Birth:  Juan San 32 y o  1995    Date of Visit: 23    SUBJECTIVE:    Jesús Mina presents today for medication management and follow-up  She reports that she is feeling well today  She denies any significant mood or anxiety symptoms  She feels her symptoms are well controlled by her current medications  Jesús Mina shares that her stress level has improved  She reports good sleep and fair appetite  She just finished her school semester and reports that she will be getting two A's  Today she is requesting a letter so that her pet cat is able to stay in her apartment  She does feel that her pet cat does provide her with emotional support and comfort  Her past provider did provide a letter for this however it is now   She also shares that she is only staying in this apartment for the next 2 months and is moving to [facility]  She denies suicidal ideation, intent or plan at present, has no suicidal ideation, intent or plan at present  She denies any auditory hallucinations and visual hallucinations, denies any other delusional thinking, denies any delusional thinking  She denies any side effects from medications    HPI ROS Appetite Changes and Sleep: normal appetite, normal sleep    Review Of Systems:      Constitutional Negative   ENT Negative   Cardiovascular Negative   Respiratory Negative   Gastrointestinal Negative   Genitourinary Negative   Musculoskeletal Negative   Integumentary Negative   Neurological Negative   Endocrine Negative   Other Symptoms Negative and None       Laboratory Results: No results found for this or any previous visit      Substance Abuse History:    Social History     Substance and Sexual Activity   Drug Use Not on file       Family Psychiatric History:     No family history on file      The following portions of the patient's history were reviewed and updated as appropriate: past family history, past medical history, past social history, past surgical history and problem list     Social History     Socioeconomic History   • Marital status: Single     Spouse name: Not on file   • Number of children: Not on file   • Years of education: Not on file   • Highest education level: Not on file   Occupational History   • Not on file   Tobacco Use   • Smoking status: Not on file   • Smokeless tobacco: Not on file   Substance and Sexual Activity   • Alcohol use: Not on file   • Drug use: Not on file   • Sexual activity: Not on file   Other Topics Concern   • Not on file   Social History Narrative   • Not on file     Social Determinants of Health     Financial Resource Strain: Not on file   Food Insecurity: Not on file   Transportation Needs: Not on file   Physical Activity: Not on file   Stress: Not on file   Social Connections: Not on file   Intimate Partner Violence: Not on file   Housing Stability: Not on file     Social History     Social History Narrative   • Not on file        Social History     Tobacco History     Smoking Status  Never Assessed    Smokeless Tobacco Use  Unknown          Alcohol History     Alcohol Use Status  Not Asked          Drug Use     Drug Use Status  Not Asked          Sexual Activity     Sexually Active  Not Asked          Activities of Daily Living    Not Asked                     OBJECTIVE:     Mental Status Evaluation:    Appearance age appropriate, casually dressed   Behavior pleasant, cooperative   Speech normal volume, normal pitch   Mood  euthymic   Affect  mood congruent   Thought Processes logical   Associations intact associations   Thought Content normal   Perceptual Disturbances: none   Abnormal Thoughts  Risk Potential Suicidal ideation - None  Homicidal ideation - None  Potential for aggression - No   Orientation oriented to person, place, time/date and situation   Memory recent and remote memory grossly intact   Cosciousness alert and awake   Attention Span attention span and concentration are age appropriate   Intellect Appears to be of Average Intelligence   Insight age appropriate    Judgement good    Muscle Strength and  Gait muscle strength and tone were normal   Language no difficulty naming common objects   Fund of Knowledge displays adequate knowledge of current events   Pain none   Pain Scale 0       Assessment/Plan:       Diagnoses and all orders for this visit:    Bipolar I disorder, in full remission ([facility] [address] )    Attention deficit hyperactivity disorder, inattentive type    Generalized anxiety disorder          Treatment Recommendations/Precautions:    Continue current medications:     Latuda 40 mg daily for mood  Adderall 10 mg BID for ADHD (changed from Adderall XR 15 mg due to shortage)  Xanax 0 25 mg daily as needed for extreme anxiety     She will follow-up in 3 months  She is aware to call the office if questions or concerns arise sooner  She is aware of emergent and nonemergent mental health resources  She will continue with her psychotherapist within this office every other week  Risks/Benefits      Risks, Benefits And Possible Side Effects Of Medications:    Risks, benefits, and possible side effects of medications explained to patient and patient verbalizes understanding and agreement for treatment  Controlled Medication Discussion:     PDMP reviewed- no red flags   The patient understands the risk of treatment with this class of medication (xanax and Adderall)  They are aware of the potential for abuse  Patient agrees not to drive or operate heavy machinery if feeling impaired, to take medication as prescribed, to not drink alcohol when taking this medication, and to not share this medication with others       Psychotherapy Provided:     Individual psychotherapy provided: No

## 2023-04-04 ENCOUNTER — OFFICE VISIT (OUTPATIENT)
Dept: PSYCHIATRY | Facility: CLINIC | Age: 28
End: 2023-04-04

## 2023-04-04 DIAGNOSIS — F31.9 BIPOLAR I DISORDER, IN FULL REMISSION (HCC): Primary | ICD-10-CM

## 2023-04-04 DIAGNOSIS — F90.0 ATTENTION DEFICIT HYPERACTIVITY DISORDER, INATTENTIVE TYPE: ICD-10-CM

## 2023-04-04 DIAGNOSIS — F41.1 GENERALIZED ANXIETY DISORDER: ICD-10-CM

## 2023-04-04 NOTE — LETTER
4/4/2023        To Whom It May Concern:     I am writing this letter at the behalf of my patient, Nicki Peterson, who lives at [address] [place] [address], [address] [place] [address], and who has been my patient since December 2022       Ms Jane Carrion has a cat that provides her with a great deal of comfort and relief of stress, and is a therapeutic and emotional support animal      Therefore, in my professional opinion, Ms Jane Carrion should be allowed to keep her support cat in her apartment      Sincerely,              Nichol Reyes PA-C

## 2023-04-26 DIAGNOSIS — F41.1 GENERALIZED ANXIETY DISORDER: ICD-10-CM

## 2023-04-26 DIAGNOSIS — F90.0 ATTENTION DEFICIT HYPERACTIVITY DISORDER, INATTENTIVE TYPE: Primary | ICD-10-CM

## 2023-04-26 DIAGNOSIS — F90.0 ATTENTION DEFICIT HYPERACTIVITY DISORDER, INATTENTIVE TYPE: ICD-10-CM

## 2023-04-26 RX ORDER — DEXTROAMPHETAMINE SACCHARATE, AMPHETAMINE ASPARTATE, DEXTROAMPHETAMINE SULFATE AND AMPHETAMINE SULFATE 1.25; 1.25; 1.25; 1.25 MG/1; MG/1; MG/1; MG/1
10 TABLET ORAL 2 TIMES DAILY
Qty: 120 TABLET | Refills: 0 | OUTPATIENT
Start: 2023-04-26 | End: 2023-05-26

## 2023-04-26 RX ORDER — ALPRAZOLAM 0.25 MG/1
0.25 TABLET ORAL DAILY
Qty: 30 TABLET | Refills: 0 | OUTPATIENT
Start: 2023-04-26

## 2023-04-26 RX ORDER — ALPRAZOLAM 0.25 MG/1
0.25 TABLET ORAL DAILY
Qty: 30 TABLET | Refills: 0 | Status: SHIPPED | OUTPATIENT
Start: 2023-04-26

## 2023-04-26 RX ORDER — DEXTROAMPHETAMINE SACCHARATE, AMPHETAMINE ASPARTATE, DEXTROAMPHETAMINE SULFATE AND AMPHETAMINE SULFATE 2.5; 2.5; 2.5; 2.5 MG/1; MG/1; MG/1; MG/1
10 TABLET ORAL
Qty: 60 TABLET | Refills: 0 | Status: SHIPPED | OUTPATIENT
Start: 2023-04-26 | End: 2023-05-24 | Stop reason: SDUPTHER

## 2023-04-26 NOTE — TELEPHONE ENCOUNTER
Medication Refill Request     Name of Medication ALPRAZolam (XANAX) 0.25 mg tablet  Dose/Frequency Take 1 tablet (0.25 mg total) by mouth daily  Quantity 30  Verified pharmacy   [x]  Verified ordering Provider   [x]  Does patient have enough for the next 3 days? Yes [x] No []  Does patient have a follow-up appointment scheduled? Yes [x] No []   If so when is appointment: 06/28/23 @ 9 am    Medication Refill Request     Name of Medication amphetamine-dextroamphetamine (ADDERALL, 5MG,) 5 MG tablet    Dose/Frequency Take 2 tablets (10 mg total) by mouth 2 (two) times a day Max Daily Amount: 20 mg  Quantity Verified pharmacy   [x]  Verified ordering Provider   [x]  Does patient have enough for the next 3 days? Yes [x] No []  Does patient have a follow-up appointment scheduled? Yes [x] No []   If so when is appointment: 06/28/23 @ 9 am

## 2023-05-04 ENCOUNTER — TELEPHONE (OUTPATIENT)
Dept: PSYCHIATRY | Facility: CLINIC | Age: 28
End: 2023-05-04

## 2023-05-04 NOTE — TELEPHONE ENCOUNTER
Patient is calling regarding cancelling an appointment      Date/Time: 5/5/2023 6:53KN    Reason: conflict    Patient was rescheduled: YES [] NO [x]  If yes, when was Patient reschedule for:     Patient requesting call back to reschedule: YES [] NO [x]

## 2023-05-19 ENCOUNTER — TELEMEDICINE (OUTPATIENT)
Dept: BEHAVIORAL/MENTAL HEALTH CLINIC | Facility: CLINIC | Age: 28
End: 2023-05-19

## 2023-05-19 DIAGNOSIS — F41.1 GENERALIZED ANXIETY DISORDER: Chronic | ICD-10-CM

## 2023-05-19 DIAGNOSIS — F31.12 BIPOLAR 1 DISORDER WITH MODERATE MANIA (HCC): Primary | ICD-10-CM

## 2023-05-19 NOTE — PSYCH
This note was not shared with the patient due to this is a psychotherapy note    Virtual Regular Visit    Verification of patient location:    Patient is located at Home in the following state in which I hold an active license [place]      Assessment/Plan:    Problem List Items Addressed This Visit        Other    Generalized anxiety disorder (Chronic)    Bipolar [facility] disorder with moderate willard ([facility] ) - Primary       Goals addressed in session: Goal 1 and Goal 2          Reason for visit is   Chief Complaint   Patient presents with   • Virtual Regular Visit        Encounter provider Alessandra Devries    Provider located at [address] [place] [address]  [address]  [place] [address]  [phone]      Recent Visits  No visits were found meeting these conditions  Showing recent visits within past 7 days and meeting all other requirements  Today's Visits  Date Type Provider Dept   05/19/23 Telemedicine Alessandra Devries [facility]   Showing today's visits and meeting all other requirements  Future Appointments  No visits were found meeting these conditions  Showing future appointments within next 150 days and meeting all other requirements       The patient was identified by name and date of birth  Shelly Will was informed that this is a telemedicine visit and that the visit is being conducted throughthe [employer] platform  She agrees to proceed     My office door was closed  No one else was in the room  She acknowledged consent and understanding of privacy and security of the video platform  The patient has agreed to participate and understands they can discontinue the visit at any time  Patient is aware this is a billable service  Behavioral Health Psychotherapy Progress Note    Psychotherapy Provided: Individual Psychotherapy     1  Bipolar 1 disorder with moderate willard ([facility] [address] )        2  "Generalized anxiety disorder            Goals addressed in session: Goal 1 and Goal 2     DATA: Writer met with karolina Curran, for an individual psychotherapy session  Ryan Farfan shared that there has been some change recently as she moved into her boyfriend's apartment  Ryan Farfan reported that her boyfriend purchased a home and they will be move into the home toward the end of summer  Ryan Farfan discussed the transition into living with her boyfriend, noting some challenges, but with a positive attitude and willingness to compromise and adjust  Ariadana Juanjojada shared that in the past several weeks she learned of some people she knows having challenges with their mental health  Ryan Farfan discussed how learning about these things causes her to imagine if it were to happen to her  álvarodana Naheed expressed thoughts and feelings about her experience with having Bipolar Disorder, the progress she has made, stigma and preventative measures she takes to prevent becoming \"sick  \" Writer actively listened and offered emotional validation  Writer spent time exploring and processing Luciens thoughts and feelings  Writer emphasized the healthy nature of Guevara's support system, education and insight about her experience with Bipolar Disorder  During this session, this clinician used the following therapeutic modalities: Cognitive Behavioral Therapy and Supportive Psychotherapy    Substance Abuse was not addressed during this session  If the client is diagnosed with a co-occurring substance use disorder, please indicate any changes in the frequency or amount of use: NA  Stage of change for addressing substance use diagnoses: No substance use/Not applicable    ASSESSMENT:  Bill Ramos presents with a Euthymic/ normal mood  her affect is Normal range and intensity, which is congruent, with her mood and the content of the session  The client has made progress on their goals      Ryan Farfan was oriented X3 and engaged " "throughout session  Eye contact was good, speech was of normal rate and tone  Thought content was normal; insight and judgement were intact  Alexander Urbina presents with a none risk of suicide, none risk of self-harm, and none risk of harm to others  For any risk assessment that surpasses a \"low\" rating, a safety plan must be developed  A safety plan was indicated: no  If yes, describe in detail NA    PLAN: Between sessions, Alexander Urbina will continue with self-care  At the next session, the therapist will use Cognitive Behavioral Therapy to address stress/anxiety  Behavioral Health Treatment Plan and Discharge Planning: Alexander Urbina is aware of and agrees to continue to work on their treatment plan  They have identified and are working toward their discharge goals   yes    Visit start and stop times:    05/19/23  Start Time: 0900  Stop Time: 0950  Total Visit Time: 50 minutes  "

## 2023-05-24 DIAGNOSIS — F90.0 ATTENTION DEFICIT HYPERACTIVITY DISORDER, INATTENTIVE TYPE: ICD-10-CM

## 2023-05-24 RX ORDER — DEXTROAMPHETAMINE SACCHARATE, AMPHETAMINE ASPARTATE, DEXTROAMPHETAMINE SULFATE AND AMPHETAMINE SULFATE 2.5; 2.5; 2.5; 2.5 MG/1; MG/1; MG/1; MG/1
10 TABLET ORAL
Qty: 60 TABLET | Refills: 0 | Status: SHIPPED | OUTPATIENT
Start: 2023-05-24 | End: 2023-06-23

## 2023-05-24 NOTE — TELEPHONE ENCOUNTER
Medication Refill Request     Name of Medication amphetamine-dextroamphetamine (ADDERALL, 10MG,) 10 mg tablet  Dose/Frequency Take 1 tablet (10 mg total) by mouth 2 (two) times a day Max Daily Amount: 20 mg  Quantity 60  Verified pharmacy   [x]  Verified ordering Provider   [x]  Does patient have enough for the next 3 days? Yes [x] No []  Does patient have a follow-up appointment scheduled?  Yes [x] No []   If so when is appointment: 6/28/23

## 2023-06-02 ENCOUNTER — TELEMEDICINE (OUTPATIENT)
Dept: BEHAVIORAL/MENTAL HEALTH CLINIC | Facility: CLINIC | Age: 28
End: 2023-06-02

## 2023-06-02 DIAGNOSIS — F41.1 GENERALIZED ANXIETY DISORDER: Chronic | ICD-10-CM

## 2023-06-02 DIAGNOSIS — F31.9 BIPOLAR I DISORDER, IN FULL REMISSION (HCC): Primary | ICD-10-CM

## 2023-06-02 NOTE — PSYCH
This note was not shared with the patient due to this is a psychotherapy note    Virtual Regular Visit    Verification of patient location:    Patient is located at Home in the following state in which I hold an active license [place]      Assessment/Plan:    Problem List Items Addressed This Visit        Other    Generalized anxiety disorder (Chronic)    Bipolar I disorder, in full remission ([facility] ) - Primary       Goals addressed in session: Goal 1 and Goal 2          Reason for visit is   Chief Complaint   Patient presents with   • Virtual Regular Visit        Encounter provider Fiordaliza Dominguez    Provider located at [address]  [place] [address]  [phone]      Recent Visits  No visits were found meeting these conditions  Showing recent visits within past 7 days and meeting all other requirements  Today's Visits  Date Type Provider Dept   06/02/23 Telemedicine Fiordaliza Dominguez [facility] Therap[facility]   Showing today's visits and meeting all other requirements  Future Appointments  No visits were found meeting these conditions  Showing future appointments within next 150 days and meeting all other requirements       The patient was identified by name and date of birth  Lucas Jacinto was informed that this is a telemedicine visit and that the visit is being conducted throughthe [employer] platform  She agrees to proceed     My office door was closed  No one else was in the room  She acknowledged consent and understanding of privacy and security of the video platform  The patient has agreed to participate and understands they can discontinue the visit at any time  Patient is aware this is a billable service  Behavioral Health Psychotherapy Progress Note    Psychotherapy Provided: Individual Psychotherapy     1  Bipolar I disorder, in full remission ([facility] [address] )        2  Generalized anxiety disorder            Goals addressed in session: Goal 1 and Goal 2     DATA: Writer met with Margarita Payan for an individual psychotherapy session  Margarita Payan discussed her continued adjustment to living with her boyfriend  Margarita Payan spent some time discussing her relationship with her boyfriend, noting how it has been different than other relationships she has had, in a positive way  Margarita Payan discussed her thoughts about their future  Margarita Payan spent time discussing her relationship with food - noting that it has been unhealthy  Margarita Payan also identified some distorted thoughts when viewing herself in the mirror  Margarita Payan reported that she has gained insight about this and has actively been trying to improve her relationship with food and her body  Margarita Payan identified challenges and supports she has encountered along the way  Writer actively listened and spent time exploring and processing thoughts and feelings  Writer utilized CBT techniques to respond to unhelpful thinking patterns and challenged distorted cognitions  Writer encouraged self-care and healthy coping  During this session, this clinician used the following therapeutic modalities: Cognitive Behavioral Therapy and Supportive Psychotherapy    Substance Abuse was not addressed during this session  If the client is diagnosed with a co-occurring substance use disorder, please indicate any changes in the frequency or amount of use: NA  Stage of change for addressing substance use diagnoses: No substance use/Not applicable    ASSESSMENT:  Paige Mahoney presents with a Euthymic/ normal mood  her affect is Normal range and intensity, which is congruent, with her mood and the content of the session  The client has made progress on their goals  Margarita Payan was oriented X3 and engaged during session  Eye contact was good, speech was of normal rate and tone  Thought content was normal; insight and judgement were intact   Trisha Sic "Jamarcus Randhawa presents with a none risk of suicide, none risk of self-harm, and none risk of harm to others  For any risk assessment that surpasses a \"low\" rating, a safety plan must be developed  A safety plan was indicated: no  If yes, describe in detail NA    PLAN: Between sessions, Mini Gary will engage in self-care  At the next session, the therapist will use Cognitive Behavioral Therapy to address stress/anxiety  Behavioral Health Treatment Plan and Discharge Planning: Mini Gary is aware of and agrees to continue to work on their treatment plan  They have identified and are working toward their discharge goals   yes    Visit start and stop times:    06/02/23  Start Time: 0902  Stop Time: 9179  Total Visit Time: 47 minutes  "

## 2023-06-16 ENCOUNTER — TELEMEDICINE (OUTPATIENT)
Dept: BEHAVIORAL/MENTAL HEALTH CLINIC | Facility: CLINIC | Age: 28
End: 2023-06-16

## 2023-06-16 DIAGNOSIS — F31.12 BIPOLAR 1 DISORDER WITH MODERATE MANIA (HCC): Primary | ICD-10-CM

## 2023-06-16 DIAGNOSIS — F41.1 GENERALIZED ANXIETY DISORDER: Chronic | ICD-10-CM
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
## 2023-06-19 DIAGNOSIS — F90.0 ATTENTION DEFICIT HYPERACTIVITY DISORDER, INATTENTIVE TYPE: ICD-10-CM

## 2023-06-19 DIAGNOSIS — F31.9 BIPOLAR I DISORDER, IN FULL REMISSION (HCC): ICD-10-CM

## 2023-06-19 RX ORDER — LURASIDONE HYDROCHLORIDE 40 MG/1
40 TABLET, FILM COATED ORAL
Qty: 30 TABLET | Refills: 2 | Status: SHIPPED | OUTPATIENT
Start: 2023-06-19 | End: 2023-09-17

## 2023-06-19 RX ORDER — DEXTROAMPHETAMINE SACCHARATE, AMPHETAMINE ASPARTATE, DEXTROAMPHETAMINE SULFATE AND AMPHETAMINE SULFATE 2.5; 2.5; 2.5; 2.5 MG/1; MG/1; MG/1; MG/1
10 TABLET ORAL
Qty: 60 TABLET | Refills: 0 | Status: SHIPPED | OUTPATIENT
Start: 2023-06-19 | End: 2023-07-19

## 2023-06-27 NOTE — PSYCH
This note was not shared with the patient due to reasonable likelihood of causing patient harm    Virtual Regular Visit    Problem List Items Addressed This Visit        Other    Generalized anxiety disorder (Chronic)    Bipolar I disorder, in full remission ([facility] [address] ) - Primary    Attention deficit hyperactivity disorder, inattentive type     Reason for visit is   Chief Complaint   Patient presents with   • Medication Management   • Follow-up     Encounter provider Belem Suh PA-C    Provider located at [address] [place] [address] [address] [place], [place] [address]  [place] [address]  [phone]    Recent Visits  No visits were found meeting these conditions  Showing recent visits within past 7 days and meeting all other requirements  Today's Visits  Date Type Provider Dept   06/28/23 Telemedicine Belem Suh [facility]   Showing today's visits and meeting all other requirements  Future Appointments  No visits were found meeting these conditions  Showing future appointments within next 150 days and meeting all other requirements       After connecting through [employer], the patient was identified by name and date of birth  Bob Christiansen was informed that this is a telemedicine visit and that the visit is being conducted through the [employer]  She agrees to proceed  which may not be secure and therefore, might not be HIPAA-compliant  My office door was closed  No one else was in the room  She acknowledged consent and understanding of privacy and security of the video platform  The patient has agreed to participate and understands they can discontinue the visit at any time  SUBJECTIVE:    Bob Christiansen is a 25 y o  female with a history of bipolar, ADHD, and BUCK who presents for virtual appointment today    She shares that she recently fractured her foot and therefore has been taking some time off of work   She is currently taking a summer class that is going well  She shares that she only has 2 classes of school left  She reports that she has been sleeping well and getting adequate nutrition  She is trying to follow intuitive eating  She feels that she is handling her stress level well  She is not using her as needed Xanax often  Overall her mood and her anxiety level have been stable  She is taking her medications consistently  She feels that her current regimen is helpful and she would like to remain on the same  She denies suicidal ideation, intent or plan at present, has no suicidal ideation, intent or plan at present      She denies any auditory hallucinations and visual hallucinations, denies any other delusional thinking, denies any delusional thinking      She denies any side effects from medications    HPI ROS Appetite Changes and Sleep: good sleep and fair appetite     Review Of Systems:     Constitutional Negative   ENT Negative   Cardiovascular Negative   Respiratory Negative   Gastrointestinal Negative   Genitourinary Negative   Musculoskeletal Negative   Integumentary Negative   Neurological Negative   Endocrine Negative   Other Symptoms Negative and None          Substance Abuse History:    Social History     Substance and Sexual Activity   Drug Use Not on file       Family Psychiatric History:     No family history on file      Social History     Socioeconomic History   • Marital status: Single     Spouse name: Not on file   • Number of children: Not on file   • Years of education: Not on file   • Highest education level: Not on file   Occupational History   • Not on file   Tobacco Use   • Smoking status: Not on file   • Smokeless tobacco: Not on file   Substance and Sexual Activity   • Alcohol use: Not on file   • Drug use: Not on file   • Sexual activity: Not on file   Other Topics Concern   • Not on file   Social History Narrative   • Not on file     Social Determinants of Health     Financial Resource Strain: Not on file   Food Insecurity: Not on file   Transportation Needs: Not on file   Physical Activity: Not on file   Stress: Not on file   Social Connections: Not on file   Intimate Partner Violence: Not on file   Housing Stability: Not on file       No past medical history on file  No past surgical history on file  Current Outpatient Medications   Medication Sig Dispense Refill   • ALPRAZolam (XANAX) 0 25 mg tablet Take 1 tablet (0 25 mg total) by mouth daily 30 tablet 0   • amphetamine-dextroamphetamine (ADDERALL, 10MG,) 10 mg tablet Take 1 tablet (10 mg total) by mouth 2 (two) times a day Max Daily Amount: 20 mg 60 tablet 0   • Aubra EQ 0 1-20 MG-MCG per tablet      • cyclobenzaprine (FLEXERIL) 5 mg tablet TAKE 1 TABLET BY MOUTH DAILY FOR MUSCLE SPASM     • EPINEPHrine (EPIPEN) 0 3 mg/0 3 mL SOAJ Inject as directed     • lurasidone (Latuda) 40 mg tablet Take 1 tablet (40 mg total) by mouth daily at bedtime 30 tablet 2   • meloxicam (MOBIC) 15 mg tablet TAKE 1 TABLET BY MOUTH DAILY FOR 10 DAYS     • Multiple Vitamin (MULTI-VITAMIN DAILY PO) Take by mouth       No current facility-administered medications for this visit          Allergies   Allergen Reactions   • Cashew Nut Oil - Food Allergy      Allergic to cashews       The following portions of the patient's history were reviewed and updated as appropriate: allergies, current medications, past family history, past medical history, past social history, past surgical history and problem list     OBJECTIVE:     Mental Status Examination:    Appearance age appropriate, casually dressed   Behavior pleasant, cooperative, talkative    Speech normal volume, normal pitch   Mood  euthymic   Affect  mood congruent   Thought Processes logical   Associations intact associations   Thought Content normal   Perceptual Disturbances: none   Abnormal Thoughts  Risk Potential Suicidal ideation - None  Homicidal ideation - None  Potential for aggression - No   Orientation oriented to person, place, time/date and situation   Memory recent and remote memory grossly intact   Cosciousness alert and awake   Attention Span attention span and concentration are age appropriate   Intellect Appears to be of Average Intelligence   Insight age appropriate    Judgement good    Muscle Strength and  Gait muscle strength and tone were normal   Language no difficulty naming common objects   Fund of Knowledge displays adequate knowledge of current events   Pain none   Pain Scale 0          Laboratory Results: No results found for this or any previous visit  Assessment/Plan:       Diagnoses and all orders for this visit:    Bipolar I disorder, in full remission ([facility] [address] )    Attention deficit hyperactivity disorder, inattentive type    Generalized anxiety disorder          Treatment Recommendations- Risks Benefits      Continue current medications:     Latuda 40 mg daily for mood  Adderall 10 mg BID for ADHD (changed from Adderall XR 15 mg due to shortage)  Xanax 0 25 mg daily as needed for extreme anxiety     She will follow-up in 3 months  She is aware to call the office if questions or concerns arise sooner  She is aware of emergent and nonemergent mental health resources  She will continue with her psychotherapist within this office every other week  Risks, Benefits And Possible Side Effects Of Medications:  Discussed     Controlled Medication Discussion: PDMP reviewed- no red flags    Psychotherapy Provided: No    Treatment Plan:    Completed and signed during the session: Not applicable - Treatment Plan to be completed by [address] [place] [address] [address] therapist    I spent 19 minutes with the patient during this visit      Filipe Reilly PA-C 06/28/23

## 2023-06-28 ENCOUNTER — TELEMEDICINE (OUTPATIENT)
Dept: PSYCHIATRY | Facility: CLINIC | Age: 28
End: 2023-06-28
Payer: COMMERCIAL

## 2023-06-28 DIAGNOSIS — F90.0 ATTENTION DEFICIT HYPERACTIVITY DISORDER, INATTENTIVE TYPE: ICD-10-CM

## 2023-06-28 DIAGNOSIS — F31.9 BIPOLAR I DISORDER, IN FULL REMISSION (HCC): Primary | ICD-10-CM

## 2023-06-28 DIAGNOSIS — F41.1 GENERALIZED ANXIETY DISORDER: ICD-10-CM

## 2023-06-28 PROCEDURE — 99214 OFFICE O/P EST MOD 30 MIN: CPT | Performed by: PHYSICIAN ASSISTANT

## 2023-06-30 ENCOUNTER — TELEMEDICINE (OUTPATIENT)
Dept: BEHAVIORAL/MENTAL HEALTH CLINIC | Facility: CLINIC | Age: 28
End: 2023-06-30
Payer: COMMERCIAL

## 2023-06-30 DIAGNOSIS — F31.12 BIPOLAR 1 DISORDER WITH MODERATE MANIA (HCC): Primary | ICD-10-CM

## 2023-06-30 DIAGNOSIS — F41.1 GENERALIZED ANXIETY DISORDER: Chronic | ICD-10-CM
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
## 2023-07-10 ENCOUNTER — TELEMEDICINE (OUTPATIENT)
Dept: BEHAVIORAL/MENTAL HEALTH CLINIC | Facility: CLINIC | Age: 28
End: 2023-07-10
Payer: COMMERCIAL

## 2023-07-10 DIAGNOSIS — F31.12 BIPOLAR 1 DISORDER WITH MODERATE MANIA (HCC): Primary | ICD-10-CM

## 2023-07-10 DIAGNOSIS — F41.1 GENERALIZED ANXIETY DISORDER: Chronic | ICD-10-CM

## 2023-07-10 PROCEDURE — 90834 PSYTX W PT 45 MINUTES: CPT | Performed by: SOCIAL WORKER
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
## 2023-07-13 DIAGNOSIS — F90.0 ATTENTION DEFICIT HYPERACTIVITY DISORDER, INATTENTIVE TYPE: ICD-10-CM

## 2023-07-13 RX ORDER — DEXTROAMPHETAMINE SACCHARATE, AMPHETAMINE ASPARTATE, DEXTROAMPHETAMINE SULFATE AND AMPHETAMINE SULFATE 2.5; 2.5; 2.5; 2.5 MG/1; MG/1; MG/1; MG/1
10 TABLET ORAL
Qty: 60 TABLET | Refills: 0 | Status: SHIPPED | OUTPATIENT
Start: 2023-07-13 | End: 2023-08-12

## 2023-08-04 ENCOUNTER — TELEPHONE (OUTPATIENT)
Dept: PSYCHIATRY | Facility: CLINIC | Age: 28
End: 2023-08-04

## 2023-08-04 NOTE — TELEPHONE ENCOUNTER
Patient is calling regarding cancelling an appointment.    Date/Time: 08/04/23 @ 9 AM    Reason: At Doctors Appointment    Patient was rescheduled: YES [] NO [x]  If yes, when was Patient reschedule for: Pt state she will call back    Patient requesting call back to reschedule: YES [] NO [x]

## 2023-08-09 DIAGNOSIS — F90.0 ATTENTION DEFICIT HYPERACTIVITY DISORDER, INATTENTIVE TYPE: ICD-10-CM

## 2023-08-09 RX ORDER — DEXTROAMPHETAMINE SACCHARATE, AMPHETAMINE ASPARTATE, DEXTROAMPHETAMINE SULFATE AND AMPHETAMINE SULFATE 2.5; 2.5; 2.5; 2.5 MG/1; MG/1; MG/1; MG/1
10 TABLET ORAL
Qty: 60 TABLET | Refills: 0 | Status: SHIPPED | OUTPATIENT
Start: 2023-08-09 | End: 2023-09-08

## 2023-08-09 NOTE — TELEPHONE ENCOUNTER
Medication Refill Request     Name of Medication Adderall 10 mg  Dose/Frequency 1bid  Quantity 60  Verified pharmacy   [x]  Verified ordering Provider   [x]  Does patient have enough for the next 3 days? Yes [] No [x]  Does patient have a follow-up appointment scheduled?  Yes [x] No []   If so when is appointment: 9/28/2023

## 2023-08-18 ENCOUNTER — TELEMEDICINE (OUTPATIENT)
Dept: BEHAVIORAL/MENTAL HEALTH CLINIC | Facility: CLINIC | Age: 28
End: 2023-08-18
Payer: COMMERCIAL

## 2023-08-18 DIAGNOSIS — F31.12 BIPOLAR 1 DISORDER WITH MODERATE MANIA (HCC): Primary | ICD-10-CM

## 2023-08-18 DIAGNOSIS — F41.1 GENERALIZED ANXIETY DISORDER: Chronic | ICD-10-CM

## 2023-08-18 PROCEDURE — 90834 PSYTX W PT 45 MINUTES: CPT | Performed by: SOCIAL WORKER
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
## 2023-09-01 ENCOUNTER — TELEMEDICINE (OUTPATIENT)
Dept: BEHAVIORAL/MENTAL HEALTH CLINIC | Facility: CLINIC | Age: 28
End: 2023-09-01
Payer: COMMERCIAL

## 2023-09-01 DIAGNOSIS — F31.12 BIPOLAR 1 DISORDER WITH MODERATE MANIA (HCC): Primary | ICD-10-CM

## 2023-09-01 DIAGNOSIS — F41.1 GENERALIZED ANXIETY DISORDER: Chronic | ICD-10-CM

## 2023-09-01 PROCEDURE — 90834 PSYTX W PT 45 MINUTES: CPT | Performed by: SOCIAL WORKER

## 2023-09-01 NOTE — PSYCH
This note was not shared with the patient due to this is a psychotherapy note    Virtual Regular Visit    Verification of patient location:    Patient is located at Home in the following state in which I hold an active license [place]      Assessment/Plan:    Problem List Items Addressed This Visit        Other    Generalized anxiety disorder (Chronic)    Bipolar 1 disorder with moderate willard ([address]) - Primary       Goals addressed in session: Goal 1 and Goal 2          Reason for visit is   Chief Complaint   Patient presents with   • Virtual Regular Visit        Encounter provider Adan Rangel    Provider located at [address] [place] [address]  400 [facility]  #8  [address] [place] [address]  [phone]      Recent Visits  No visits were found meeting these conditions. Showing recent visits within past 7 days and meeting all other requirements  Today's Visits  Date Type Provider Dept   09/01/23 Telemedicine Adan Rangel  [facility]   Showing today's visits and meeting all other requirements  Future Appointments  No visits were found meeting these conditions. Showing future appointments within next 150 days and meeting all other requirements       The patient was identified by name and date of birth. Lucy Louis was informed that this is a telemedicine visit and that the visit is being conducted throughthe 71lbs platform. She agrees to proceed. .  My office door was closed. No one else was in the room. She acknowledged consent and understanding of privacy and security of the video platform. The patient has agreed to participate and understands they can discontinue the visit at any time. Patient is aware this is a billable service. Behavioral Health Psychotherapy Progress Note    Psychotherapy Provided: Individual Psychotherapy     1. Bipolar 1 disorder with moderate willard ([address])        2. Generalized anxiety disorder            Goals addressed in session: Goal 1 and Goal 2     DATA: Writer met with Johnny Hansen, virtually, for an individual psychotherapy session. Johnny Hansen reported that she moved into her new home this past week. Johnny Hansen discussed the moving process and some associated stress. Johnny Hansen reported that she has had help from friends and family. Johnny Hansen shared that she is starting her last semester of her master's program which she identified as "bittersweet." Johnny Hansen spent time reflecting on her journey through school including some of the barriers and challenges she faced, as well as some of the successes. Johnny Hansen noted how her mental health played a role throughout her journey. Writer actively listened as Johnny Hansen shared and spent time processing thoughts and feelings. Writer assisted Johnny Hansen with recognizing her strengths and resilience. Writer encouraged use of self-care throughout this last part of her educational journey. During this session, this clinician used the following therapeutic modalities: Cognitive Behavioral Therapy and Supportive Psychotherapy    Substance Abuse was not addressed during this session. If the client is diagnosed with a co-occurring substance use disorder, please indicate any changes in the frequency or amount of use: NA. Stage of change for addressing substance use diagnoses: No substance use/Not applicable    ASSESSMENT:  Renato Triana presents with a Euthymic/ normal mood. her affect is Normal range and intensity, which is congruent, with her mood and the content of the session. The client has made progress on their goals. Johnny Hansen was oriented X3 and engaged. Eye contact was good, speech was of normal rate and tone. Thought content was normal; insight and judgement were intact. Renato Triana presents with a none risk of suicide, none risk of self-harm, and none risk of harm to others.     For any risk assessment that surpasses a "low" rating, a safety plan must be developed. A safety plan was indicated: no  If yes, describe in detail NA    PLAN: Between sessions, Vasile Tobias will utilize self care and manage stress while continuing to settle into her new home and start her new semester at school. At the next session, the therapist will use Cognitive Behavioral Therapy to address stress/depression/anxiety. Behavioral Health Treatment Plan and Discharge Planning: Vasile Tobias is aware of and agrees to continue to work on their treatment plan. They have identified and are working toward their discharge goals.  yes    Visit start and stop times:    09/01/23  Start Time: 0900  Stop Time: 0944  Total Visit Time: 44 minutes

## 2023-09-11 DIAGNOSIS — F31.9 BIPOLAR I DISORDER, IN FULL REMISSION (HCC): ICD-10-CM

## 2023-09-11 DIAGNOSIS — F90.0 ATTENTION DEFICIT HYPERACTIVITY DISORDER, INATTENTIVE TYPE: ICD-10-CM

## 2023-09-11 RX ORDER — DEXTROAMPHETAMINE SACCHARATE, AMPHETAMINE ASPARTATE, DEXTROAMPHETAMINE SULFATE AND AMPHETAMINE SULFATE 2.5; 2.5; 2.5; 2.5 MG/1; MG/1; MG/1; MG/1
10 TABLET ORAL
Qty: 60 TABLET | Refills: 0 | Status: SHIPPED | OUTPATIENT
Start: 2023-09-11 | End: 2023-10-11

## 2023-09-11 RX ORDER — LURASIDONE HYDROCHLORIDE 40 MG/1
40 TABLET, FILM COATED ORAL
Qty: 30 TABLET | Refills: 2 | Status: SHIPPED | OUTPATIENT
Start: 2023-09-11 | End: 2023-12-10

## 2023-09-15 ENCOUNTER — TELEMEDICINE (OUTPATIENT)
Dept: BEHAVIORAL/MENTAL HEALTH CLINIC | Facility: CLINIC | Age: 28
End: 2023-09-15
Payer: COMMERCIAL

## 2023-09-15 DIAGNOSIS — F31.12 BIPOLAR 1 DISORDER WITH MODERATE MANIA (HCC): Primary | ICD-10-CM

## 2023-09-15 DIAGNOSIS — F41.1 GENERALIZED ANXIETY DISORDER: Chronic | ICD-10-CM

## 2023-09-15 PROCEDURE — 90834 PSYTX W PT 45 MINUTES: CPT | Performed by: SOCIAL WORKER
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
## 2023-09-27 NOTE — PSYCH
This note was not shared with the patient due to reasonable likelihood of causing patient harm    Virtual Regular Visit    Problem List Items Addressed This Visit        Other    Generalized anxiety disorder (Chronic)    Bipolar I disorder, in full remission ([address]) - Primary    Attention deficit hyperactivity disorder, inattentive type     Reason for visit is   Chief Complaint   Patient presents with   • Medication Management   • Follow-up     Encounter provider Ana Laura Pratt PA-C    Provider located at [address] [place] [address]  [address]8  [address] [place] [address]  [phone]    Recent Visits  No visits were found meeting these conditions. Showing recent visits within past 7 days and meeting all other requirements  Today's Visits  Date Type Provider Dept   09/28/23 Telemedicine Ana Laura Pratt PA-C  [facility]   Showing today's visits and meeting all other requirements  Future Appointments  No visits were found meeting these conditions. Showing future appointments within next 150 days and meeting all other requirements       After connecting through [employer], the patient was identified by name and date of birth. Vanessa Romberg was informed that this is a telemedicine visit and that the visit is being conducted through the [employer]. She agrees to proceed. My office door was closed. No one else was in the room. She acknowledged consent and understanding of privacy and security of the video platform. The patient has agreed to participate and understands they can discontinue the visit at any time. SUBJECTIVE:    Vanessa Romberg is a 25 y.o. female with a history of bipolar disorder, ADHD, and anxiety who presents for virtual appointment today. She reports that she is feeling well today. She has been spending most of her time working as well as finishing up her last classes for her degree. At this time she feels her mood has been stable. She reports her anxiety level has been fairly low over the last few weeks. She has not needed to use her as needed Xanax over the last 2 weeks. She feels physically well today. She has been consistent with her medication regimen. She feels her symptoms are well controlled on her current regimen and she would like to remain on the same. She denies suicidal ideation, intent or plan at present, has no suicidal ideation, intent or plan at present.     She denies any auditory hallucinations and visual hallucinations, denies any other delusional thinking, denies any delusional thinking.     She denies any side effects from medications    HPI ROS Appetite Changes and Sleep: good sleep, fair appetite     Review Of Systems:     Constitutional Negative   ENT Negative   Cardiovascular Negative   Respiratory Negative   Gastrointestinal Negative   Genitourinary Negative   Musculoskeletal Negative   Integumentary Negative   Neurological Negative   Endocrine Negative   Other Symptoms Negative and None          Substance Abuse History:    Social History     Substance and Sexual Activity   Drug Use Not on file       Family Psychiatric History:     No family history on file.     Social History     Socioeconomic History   • Marital status: Single     Spouse name: Not on file   • Number of children: Not on file   • Years of education: Not on file   • Highest education level: Not on file   Occupational History   • Not on file   Tobacco Use   • Smoking status: Not on file   • Smokeless tobacco: Not on file   Substance and Sexual Activity   • Alcohol use: Not on file   • Drug use: Not on file   • Sexual activity: Not on file   Other Topics Concern   • Not on file   Social History Narrative   • Not on file     Social Determinants of Health     Financial Resource Strain: Not on file   Food Insecurity: Not on file   Transportation Needs: Not on file   Physical Activity: Not on file Stress: Not on file   Social Connections: Not on file   Intimate Partner Violence: Not on file   Housing Stability: Not on file       No past medical history on file. No past surgical history on file. Current Outpatient Medications   Medication Sig Dispense Refill   • ALPRAZolam (XANAX) 0.25 mg tablet Take 1 tablet (0.25 mg total) by mouth daily 30 tablet 0   • amphetamine-dextroamphetamine (ADDERALL, 10MG,) 10 mg tablet Take 1 tablet (10 mg total) by mouth 2 (two) times a day Max Daily Amount: 20 mg 60 tablet 0   • Aubra EQ 0.1-20 MG-MCG per tablet      • cyclobenzaprine (FLEXERIL) 5 mg tablet TAKE 1 TABLET BY MOUTH DAILY FOR MUSCLE SPASM     • EPINEPHrine (EPIPEN) 0.3 mg/0.3 mL SOAJ Inject as directed     • lurasidone (Latuda) 40 mg tablet Take 1 tablet (40 mg total) by mouth daily at bedtime 30 tablet 2   • meloxicam (MOBIC) 15 mg tablet TAKE 1 TABLET BY MOUTH DAILY FOR 10 DAYS     • Multiple Vitamin (MULTI-VITAMIN DAILY PO) Take by mouth       No current facility-administered medications for this visit.         Allergies   Allergen Reactions   • Cashew Nut Oil - Food Allergy      Allergic to cashews       The following portions of the patient's history were reviewed and updated as appropriate: allergies, current medications, past family history, past medical history, past social history, past surgical history and problem list.    OBJECTIVE:     Mental Status Examination:    Appearance age appropriate, casually dressed   Behavior pleasant, cooperative   Speech normal volume, normal pitch   Mood  euthymic   Affect  mood congruent   Thought Processes logical   Associations intact associations   Thought Content normal   Perceptual Disturbances: none   Abnormal Thoughts  Risk Potential Suicidal ideation - None  Homicidal ideation - None  Potential for aggression - No   Orientation oriented to person, place, time/date and situation   Memory recent and remote memory grossly intact   Cosciousness alert and awake Attention Span attention span and concentration are age appropriate   Intellect Appears to be of Average Intelligence   Insight age appropriate    Judgement good    Muscle Strength and  Gait muscle strength and tone were normal   Language no difficulty naming common objects   Fund of Knowledge displays adequate knowledge of current events   Pain none   Pain Scale 0          Laboratory Results: No results found for this or any previous visit. Assessment/Plan:       Diagnoses and all orders for this visit:    Bipolar I disorder, in full remission ([address])    Attention deficit hyperactivity disorder, inattentive type    Generalized anxiety disorder          Treatment Recommendations- Risks Benefits      Continue current medications:     Latuda 40 mg daily for mood  Adderall 10 mg BID for ADHD (changed from Adderall XR 15 mg due to shortage)  Xanax 0.25 mg daily as needed for extreme anxiety     She will follow-up in 3 months. She is aware to call the office if questions or concerns arise sooner. She is aware of emergent and nonemergent mental health resources. She will continue with her psychotherapist within this office every other week. Risks, Benefits And Possible Side Effects Of Medications: Discussed    Controlled Medication Discussion: PDMP reviewed- no red flags    Psychotherapy Provided: no    Treatment Plan:    Completed and signed during the session: Not applicable - Treatment Plan to be completed by [address] [place] [address] therapist    I spent 25 minutes with the patient during this visit. This note was completed in part utilizing Dragon dictation Software. Grammatical, translation, syntax errors, random word insertions, spelling mistakes, and incomplete sentences may be an occasional consequence of this system secondary to software limitations with voice recognition, ambient noise, and hardware issues.  If you have any questions or concerns about the content, text, or information contained within the body of this dictation, please contact the provider for clarification.     Casie Ahn PA-C 09/28/23

## 2023-09-28 ENCOUNTER — TELEMEDICINE (OUTPATIENT)
Dept: PSYCHIATRY | Facility: CLINIC | Age: 28
End: 2023-09-28
Payer: COMMERCIAL

## 2023-09-28 DIAGNOSIS — F31.9 BIPOLAR I DISORDER, IN FULL REMISSION (HCC): Primary | ICD-10-CM

## 2023-09-28 DIAGNOSIS — F90.0 ATTENTION DEFICIT HYPERACTIVITY DISORDER, INATTENTIVE TYPE: ICD-10-CM

## 2023-09-28 DIAGNOSIS — F41.1 GENERALIZED ANXIETY DISORDER: ICD-10-CM

## 2023-09-28 PROCEDURE — 99214 OFFICE O/P EST MOD 30 MIN: CPT | Performed by: PHYSICIAN ASSISTANT

## 2023-09-29 ENCOUNTER — TELEMEDICINE (OUTPATIENT)
Dept: BEHAVIORAL/MENTAL HEALTH CLINIC | Facility: CLINIC | Age: 28
End: 2023-09-29
Payer: COMMERCIAL

## 2023-09-29 DIAGNOSIS — F31.12 BIPOLAR 1 DISORDER WITH MODERATE MANIA (HCC): Primary | ICD-10-CM

## 2023-09-29 DIAGNOSIS — F41.1 GENERALIZED ANXIETY DISORDER: Chronic | ICD-10-CM

## 2023-09-29 PROCEDURE — 90834 PSYTX W PT 45 MINUTES: CPT | Performed by: SOCIAL WORKER

## 2023-09-29 NOTE — PSYCH
This note was not shared with the patient due to this is a psychotherapy note     Virtual Regular Visit    Verification of patient location:    Patient is located at Home in the following state in which I hold an active license [place]      Assessment/Plan:    Problem List Items Addressed This Visit        Other    Generalized anxiety disorder (Chronic)    Bipolar 1 disorder with moderate willard ([address]) - Primary       Goals addressed in session: Goal 3           Reason for visit is   Chief Complaint   Patient presents with   • Virtual Regular Visit        Encounter provider Abimael Marti    Provider located at [address] [place] [address]  #8  [address] [place] [address]  [phone]      Recent Visits  No visits were found meeting these conditions. Showing recent visits within past 7 days and meeting all other requirements  Today's Visits  Date Type Provider Dept   09/29/23 Telemedicine Abimael Marti [facility]   Showing today's visits and meeting all other requirements  Future Appointments  No visits were found meeting these conditions. Showing future appointments within next 150 days and meeting all other requirements       The patient was identified by name and date of birth. Hunter Vail was informed that this is a telemedicine visit and that the visit is being conducted throughthe [employer] platform. She agrees to proceed. .  My office door was closed. No one else was in the room. She acknowledged consent and understanding of privacy and security of the video platform. The patient has agreed to participate and understands they can discontinue the visit at any time. Patient is aware this is a billable service. Behavioral Health Psychotherapy Progress Note    Psychotherapy Provided: Individual Psychotherapy     1. Bipolar 1 disorder with moderate willard ([address])        2.  Generalized anxiety disorder            Goals addressed in session: Goal 3      DATA: Writer met with Librado Johnson for an individual psychotherapy session. Librado Johnson reported that she has been well overall. 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 Librado Johnson identified that she has decided to take the challenges in stride, rather than to fight back as her goal is to complete it and move forward. 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 Librado Johnson went on to report that she has been very happy with her significant other and has been curious about a potential proposal in the near future. Librado Johnson expressed gratefulness about her life and the happiness she has found with her partner. Librado Johnson spent time discussing future goals about work, reporting that she intends on making choices that will fulfill her and reflect well with her mental health. During this session, this clinician used the following therapeutic modalities: Cognitive Behavioral Therapy and Supportive Psychotherapy    Substance Abuse was not addressed during this session. If the client is diagnosed with a co-occurring substance use disorder, please indicate any changes in the frequency or amount of use: NA. Stage of change for addressing substance use diagnoses: No substance use/Not applicable    ASSESSMENT:  Syed Urrutia presents with a Euthymic/ normal mood. her affect is Normal range and intensity, which is congruent, with her mood and the content of the session. The client has made progress on their goals. Librado Johnson was oriented X3 and engaged. Eye contact was good, speech was of normal rate and tone. Thought content was normal; insight and judgement were intact.  Syed Urrutia presents with a none risk of suicide, none risk of self-harm, and none risk of harm to others. For any risk assessment that surpasses a "low" rating, a safety plan must be developed. A safety plan was indicated: no  If yes, describe in detail NA    PLAN: Between sessions, Ramsey Borjas will focus on self-care and healthy decision making. At the next session, the therapist will use Cognitive Behavioral Therapy to address stress/anxiety/mood regulation. Behavioral Health Treatment Plan and Discharge Planning: Ramsey Borjas is aware of and agrees to continue to work on their treatment plan. They have identified and are working toward their discharge goals.  yes    Visit start and stop times:    09/29/23  Start Time: 0900  Stop Time: 0943  Total Visit Time: 43 minutes

## 2023-10-11 DIAGNOSIS — F90.0 ATTENTION DEFICIT HYPERACTIVITY DISORDER, INATTENTIVE TYPE: ICD-10-CM

## 2023-10-11 RX ORDER — DEXTROAMPHETAMINE SACCHARATE, AMPHETAMINE ASPARTATE, DEXTROAMPHETAMINE SULFATE AND AMPHETAMINE SULFATE 2.5; 2.5; 2.5; 2.5 MG/1; MG/1; MG/1; MG/1
10 TABLET ORAL
Qty: 60 TABLET | Refills: 0 | Status: SHIPPED | OUTPATIENT
Start: 2023-10-11 | End: 2023-11-10

## 2023-10-11 NOTE — TELEPHONE ENCOUNTER
Medication Refill Request     Name of Medication Adderall 10 mg  Dose/Frequency 1bid  Quantity 60  Verified pharmacy   [x]  Verified ordering Provider   [x]  Does patient have enough for the next 3 days? Yes [] No [x]  Does patient have a follow-up appointment scheduled?  Yes [] No [x]   If so when is appointment: 12/19/2023

## 2023-10-13 ENCOUNTER — TELEMEDICINE (OUTPATIENT)
Dept: BEHAVIORAL/MENTAL HEALTH CLINIC | Facility: CLINIC | Age: 28
End: 2023-10-13
Payer: COMMERCIAL

## 2023-10-13 DIAGNOSIS — F31.12 BIPOLAR 1 DISORDER WITH MODERATE MANIA (HCC): Primary | ICD-10-CM

## 2023-10-13 DIAGNOSIS — F41.1 GENERALIZED ANXIETY DISORDER: Chronic | ICD-10-CM

## 2023-10-13 PROCEDURE — 90837 PSYTX W PT 60 MINUTES: CPT | Performed by: SOCIAL WORKER

## 2023-10-13 NOTE — PSYCH
This note was not shared with the patient due to this is a psychotherapy note    Virtual Regular Visit    Verification of patient location:    Patient is located at Home in the following state in which I hold an active license [place]      Assessment/Plan:    Problem List Items Addressed This Visit          Other    Generalized anxiety disorder (Chronic)    Bipolar 1 disorder with moderate willard ([address]) - Primary       Goals addressed in session: Goal 1, Goal 2, and Goal 3           Reason for visit is   Chief Complaint   Patient presents with    Virtual Regular Visit          Encounter provider Irvin Crenshaw    Provider located at [address] [place] [address]  #8  [address] [place] [address]  [phone]      Recent Visits  No visits were found meeting these conditions. Showing recent visits within past 7 days and meeting all other requirements  Today's Visits  Date Type Provider Dept   10/13/23 Telemedicine Irvin Crenshaw [facility]   Showing today's visits and meeting all other requirements  Future Appointments  No visits were found meeting these conditions. Showing future appointments within next 150 days and meeting all other requirements       The patient was identified by name and date of birth. Ambrose Sheth was informed that this is a telemedicine visit and that the visit is being conducted throughthe [employer] platform. She agrees to proceed. .  My office door was closed. No one else was in the room. She acknowledged consent and understanding of privacy and security of the video platform. The patient has agreed to participate and understands they can discontinue the visit at any time. Patient is aware this is a billable service. Behavioral Health Psychotherapy Progress Note    Psychotherapy Provided: Individual Psychotherapy     1.  Bipolar 1 disorder with moderate willard ([address]) 2. Generalized anxiety disorder            Goals addressed in session: Goal 1, Goal 2, and Goal 3      DATA: Writer met with Yesenia Landrum for an individual psychotherapy session. Yesenia Landrum reported that she has been well overall. 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 Yesenia Landrum discussed some of the things she has been doing during her clinical hours and balancing various personalities with her coworkers. Yesenia Landrum reported that she wonders if she was experiencing some hypomania as she was able to complete all of her school work for the semester in a short time, but she has not noticed any other signs. Yesenia Landrum shared that she did go on vacation with her significant other and that it was a great time. Yesenia Landrum shared that she did not get engaged and did become upset at one point during the trip when she realized it was not happening at that time. Yesenia Landrum reported that she was able to have a healthy conversation with her significant other about how her expectations got the best of her, and that the two were able to get on the same page with future expectations. Yesenia Landrum spent time discussing her thoughts and feelings regarding her future, her view of self and use of self-talk. Writer actively listened as Yesenia Landrum shared and spent time exploring and processing thoughts and feelings. Writer offered emotional validation, thought challenging and reinforced use of healthy coping and self-care skills. During this session, this clinician used the following therapeutic modalities: Cognitive Behavioral Therapy and Supportive Psychotherapy    Substance Abuse was not addressed during this session.  If the client is diagnosed with a co-occurring substance use disorder, please indicate any changes in the frequency or amount of use: NA. Stage of change for addressing substance use diagnoses: No substance use/Not applicable    ASSESSMENT:  Slim Martin presents with a Euthymic/ normal mood. her affect is Normal range and intensity, which is congruent, with her mood and the content of the session. The client has made progress on their goals. Patel Quiles was oriented X3 and well engaged. Eye contact was good, speech was of normal rate and tone. Thought content was normal; insight and judgement were intact. Slim Martin presents with a none risk of suicide, none risk of self-harm, and none risk of harm to others. For any risk assessment that surpasses a "low" rating, a safety plan must be developed. A safety plan was indicated: yes  If yes, describe in detail NA    PLAN: Between sessions, Slim Martin will utilize healthy self-talk to decrease negative thoughts. At the next session, the therapist will use Cognitive Behavioral Therapy to address stress/anxiety/mood. Behavioral Health Treatment Plan and Discharge Planning: Slim Martin is aware of and agrees to continue to work on their treatment plan. They have identified and are working toward their discharge goals.  yes    Visit start and stop times:    10/13/23  Start Time: 0900  Stop Time: 2609  Total Visit Time: 47 minutes

## 2023-11-10 DIAGNOSIS — F90.0 ATTENTION DEFICIT HYPERACTIVITY DISORDER, INATTENTIVE TYPE: ICD-10-CM

## 2023-11-10 RX ORDER — DEXTROAMPHETAMINE SACCHARATE, AMPHETAMINE ASPARTATE, DEXTROAMPHETAMINE SULFATE AND AMPHETAMINE SULFATE 2.5; 2.5; 2.5; 2.5 MG/1; MG/1; MG/1; MG/1
10 TABLET ORAL
Qty: 60 TABLET | Refills: 0 | Status: SHIPPED | OUTPATIENT
Start: 2023-11-10 | End: 2023-12-10

## 2023-11-10 NOTE — TELEPHONE ENCOUNTER
Medication Refill Request     Name of Medication Adderall 10 mg  Dose/Frequency 1bid  Quantity 60  Verified pharmacy   [x]  Verified ordering Provider   [x]  Does patient have enough for the next 3 days? Yes [] No [x]  Does patient have a follow-up appointment scheduled?  Yes [x] No []   If so when is appointment: 12/19/2023

## 2023-12-01 ENCOUNTER — TELEPHONE (OUTPATIENT)
Dept: PSYCHIATRY | Facility: CLINIC | Age: 28
End: 2023-12-01

## 2023-12-01 ENCOUNTER — TELEMEDICINE (OUTPATIENT)
Dept: BEHAVIORAL/MENTAL HEALTH CLINIC | Facility: CLINIC | Age: 28
End: 2023-12-01

## 2023-12-01 DIAGNOSIS — F41.1 GENERALIZED ANXIETY DISORDER: Chronic | ICD-10-CM

## 2023-12-01 DIAGNOSIS — F31.12 BIPOLAR 1 DISORDER WITH MODERATE MANIA (HCC): Primary | ICD-10-CM

## 2023-12-01 NOTE — BH TREATMENT PLAN
[address]  1995      Date of Initial Psychotherapy Assessment: 4/9/21   Date of Current Treatment Plan: 12/1/23  Treatment Plan Target Date: 3/1/24  Treatment Plan Expiration Date: 6/1/24     Diagnosis:   1. Bipolar I disorder, in full remission ([address])          2. Generalized anxiety disorder                Area(s) of Need: understanding Bipolar Disorder/regulating mood/managing anxiety     Long Term Goal 1 (in the client's own words): Beatriz Vivas will increase self-awareness regarding symptoms of Bipolar Disorder     Stage of Change: Action     Target Date for completion: 6/1/24             Anticipated therapeutic modalities: CBT             People identified to complete this goal: Avelina Lópze LCSW                    Objective 1: (identify the means of measuring success in meeting the objective): Beatriz Vivas will utilize 10-15 minutes of session time to discuss insights and awareness regarding her experience with Bipolar Disorder. Long Term Goal 2 (in the client's own words): Beatriz Sangeetha will utilize healthy coping skills to assist with mood regulation. Stage of Change: Action     Target Date for completion: 6/1/24          Anticipated therapeutic modalities: CBT             People identified to complete this goal: Avelina López LCSW                    Objective 1: (identify the means of measuring success in meeting the objective): When prompted during session, Beatriz Vivas will identify use of at least two healthy coping skills that she used to assist with regulating mood. Long Term Goal 3 (in the client's own words): Beatriz Vivas will recognize triggers to anxiety.      Stage of Change: Action     Target Date for completion: 6/1/24             Anticipated therapeutic modalities: CBT             People identified to complete this goal: Aveilna López LCSW                    Objective 1: (identify the means of measuring success in meeting the objective): Mio Thomas will utilize at least 10-15 minutes of session time to discuss triggers to anxiety. Objective 2: (identify the means of measuring success in meeting the objective): Mio Thomas will implement use of at least 3 different healthy coping skills to reduce anxiety when triggered. I am currently under the care of a [facility] psychiatric provider: yes     My [facility] psychiatric provider is: Charlene Raymundo am currently taking psychiatric medications: Yes, as prescribed     I feel that I will be ready for discharge from mental health care when I reach the following (measurable goal/objective): No longer able to identify treatment goals and have maintained achieved progress, consistently for at least two months. For children and adults who have a legal guardian:          Has there been any change to custody orders and/or guardianship status? NA. If yes, attach updated documentation. Behavioral Health Treatment Plan [facility]: Diagnosis and Treatment Plan explained to [address] acknowledges an understanding of their diagnosis. Diogenes Bellamy agrees to this treatment plan.      I have been offered a copy of this Treatment Plan. yes

## 2023-12-01 NOTE — TELEPHONE ENCOUNTER
Patient is calling regarding cancelling an appointment.    Date/Time: 12/01/23 @ 9 am    Reason: At the doctor's office and waiting over an hour    Patient was rescheduled: YES [] NO [x]    If yes, when was Patient reschedule for: Pt state will call back    Patient requesting call back to reschedule: YES [] NO [x]

## 2023-12-08 DIAGNOSIS — F31.9 BIPOLAR I DISORDER, IN FULL REMISSION (HCC): ICD-10-CM

## 2023-12-08 DIAGNOSIS — F90.0 ATTENTION DEFICIT HYPERACTIVITY DISORDER, INATTENTIVE TYPE: ICD-10-CM

## 2023-12-08 RX ORDER — LURASIDONE HYDROCHLORIDE 40 MG/1
40 TABLET, FILM COATED ORAL
Qty: 30 TABLET | Refills: 2 | Status: SHIPPED | OUTPATIENT
Start: 2023-12-08

## 2023-12-08 RX ORDER — DEXTROAMPHETAMINE SACCHARATE, AMPHETAMINE ASPARTATE, DEXTROAMPHETAMINE SULFATE AND AMPHETAMINE SULFATE 2.5; 2.5; 2.5; 2.5 MG/1; MG/1; MG/1; MG/1
10 TABLET ORAL
Qty: 60 TABLET | Refills: 0 | Status: SHIPPED | OUTPATIENT
Start: 2023-12-08 | End: 2024-01-07

## 2023-12-08 NOTE — TELEPHONE ENCOUNTER
Name of Medication amphetamine-dextroamphetamine (ADDERALL, 10MG,) 10 mg tablet   Dose/Frequency   Take 1 tablet (10 mg total) by mouth 2 (two) times a day Max Daily Amount: 20 mg      Quantity 60  Verified pharmacy   [x]  Verified ordering Provider   [x]  Does patient have enough for the next 3 days? Yes [] No []  Does patient have a follow-up appointment scheduled? Yes [x] No []   If so when is appointment: 12/19/23 @ 10 am          Medication Refill Request     Name of Medication lurasidone (Latuda) 40 mg tablet   Dose/Frequency Take 1 tablet (40 mg total) by mouth daily at bedtime   Quantity 30  Verified pharmacy   [x]  Verified ordering Provider   [x]  Does patient have enough for the next 3 days? Yes [] No []  Does patient have a follow-up appointment scheduled?  Yes [x] No []   If so when is appointment: 12/19/23 10 am

## 2023-12-15 ENCOUNTER — TELEMEDICINE (OUTPATIENT)
Dept: BEHAVIORAL/MENTAL HEALTH CLINIC | Facility: CLINIC | Age: 28
End: 2023-12-15
Payer: COMMERCIAL

## 2023-12-15 DIAGNOSIS — F41.1 GENERALIZED ANXIETY DISORDER: Chronic | ICD-10-CM

## 2023-12-15 DIAGNOSIS — F31.12 BIPOLAR 1 DISORDER WITH MODERATE MANIA (HCC): Primary | ICD-10-CM

## 2023-12-15 PROCEDURE — 90834 PSYTX W PT 45 MINUTES: CPT | Performed by: SOCIAL WORKER

## 2023-12-15 NOTE — PSYCH
This note was not shared with the patient due to this is a psychotherapy note    Virtual Regular Visit    Verification of patient location:    Patient is located at Home in the following state in which I hold an active license [place]      Assessment/Plan:    Problem List Items Addressed This Visit          Other    Generalized anxiety disorder (Chronic)    Bipolar 1 disorder with moderate willard ([address]) - Primary       Goals addressed in session: Goal 1, Goal 2, and Goal 3           Reason for visit is   Chief Complaint   Patient presents with    Virtual Regular Visit          Encounter provider Jaci Figueroa    Provider located at [address] [place] [address]  [address] [place] [address]  [phone]      Recent Visits  No visits were found meeting these conditions. Showing recent visits within past 7 days and meeting all other requirements  Today's Visits  Date Type Provider Dept   12/15/23 Telemedicine Jaci Fig[facility]eroa [facility]rap[facility]   Showing today's visits and meeting all other requirements  Future Appointments  No visits were found meeting these conditions. Showing future appointments within next 150 days and meeting all other requirements       The patient was identified by name and date of birth. Vasile Tobias was informed that this is a telemedicine visit and that the visit is being conducted throughthe [employer] platform. She agrees to proceed. .  My office door was closed. No one else was in the room. She acknowledged consent and understanding of privacy and security of the video platform. The patient has agreed to participate and understands they can discontinue the visit at any time. Patient is aware this is a billable service. Behavioral Health Psychotherapy Progress Note    Psychotherapy Provided: Individual Psychotherapy     1.  Bipolar 1 disorder with moderate willard ([address]) 2. Generalized anxiety disorder            Goals addressed in session: Goal 1, Goal 2, and Goal 3      DATA: Writer met with Erick Siddiquikarolina, for an individual psychotherapy session. Erick Siddiqui reported that she has been well overall. Erick Siddiqui shared that she completed her semester of school with all A's and has applied for graduation. Erick Siddiqui shared about the relief and excitement she has regarding her accomplishment with school. Erick Siddiqui shared that she has been experiencing some stress related to work. Erick Siddiqui spent time processing her thoughts and feelings regarding this stress and identified her plan for moving forward. Erick Siddiqui noted stability in her relationship with her significant other and is hopeful of a proposal in the near future. Writer engaged Erick Siddiqui with reviewing and updating her treatment plan. Erick Siddiqui identified achievement of goals but a desire to keep them as ongoing as things come up at times. Erick Siddiqui reported that working on decreasing anxiety is still something she struggles with at times and would like to focus on. Erick Siddiqui spent time reflecting on her progress and management of Bipolar Disorder. Writer actively listened as Erick Siddiqui shared and spent time processing thoughts and feelings. Writer offered emotional validation, utilized thought redirection and emphasized Alphonsonoemi's strengths. During this session, this clinician used the following therapeutic modalities: Cognitive Behavioral Therapy and Supportive Psychotherapy    Substance Abuse was not addressed during this session. If the client is diagnosed with a co-occurring substance use disorder, please indicate any changes in the frequency or amount of use: NA. Stage of change for addressing substance use diagnoses: No substance use/Not applicable    ASSESSMENT:  Marin Ortiz presents with a Euthymic/ normal mood.      her affect is Normal range and intensity, which is congruent, with her mood and the content of the session. The client has made progress on their goals. Simeon Vaughan was oriented X3 and well engaged. Eye contact was good, speech was of normal rate and tone. Thought content was normal; insight and judgement were intact. Vasile Tobias presents with a none risk of suicide, none risk of self-harm, and none risk of harm to others. For any risk assessment that surpasses a "low" rating, a safety plan must be developed. A safety plan was indicated: no  If yes, describe in detail NA    PLAN: Between sessions, Vasile Tobias will focus on coping with stress and anxiety in a healthy manner. At the next session, the therapist will use Cognitive Behavioral Therapy to address stress/anxiety. Behavioral Health Treatment Plan and Discharge Planning: Vasile Tobias is aware of and agrees to continue to work on their treatment plan. They have identified and are working toward their discharge goals.  yes    Visit start and stop times:    12/15/23  Start Time: 0902  Stop Time: 0926  Total Visit Time: 46 minutes

## 2023-12-15 NOTE — BH TREATMENT PLAN
[address] [facility]  1995     Date of Initial Psychotherapy Assessment: 4/9/21   Date of Current Treatment Plan: 12/15/23  Treatment Plan Target Date: 3/15/24  Treatment Plan Expiration Date: 6/15/24    Diagnosis:   1. Bipolar I disorder, in full remission ([facility])        2. Generalized anxiety disorder            Area(s) of Need: understanding Bipolar Disorder/regulating mood/managing anxiety    Long Term Goal 1 (in the client's own words): Allen Whitmore will increase self-awareness regarding symptoms of Bipolar Disorder    Stage of Change: Action    Target Date for completion: 6/15/24     Anticipated therapeutic modalities: CBT     People identified to complete this goal: Rommel Johnson LCSW      Objective 1: (identify the means of measuring success in meeting the objective): Allen Whitmore will utilize 10-15 minutes of session time to discuss insights and awareness regarding her experience with Bipolar Disorder (achieved, ongoing as of 12/15/23). Long Term Goal 2 (in the client's own words): Allen Whitmore will utilize healthy coping skills to assist with mood regulation. Stage of Change: Action    Target Date for completion: 6/15/24     Anticipated therapeutic modalities: CBT     People identified to complete this goal: Rommel Johnson LCSW      Objective 1: (identify the means of measuring success in meeting the objective): When prompted during session, Allen Whitmore will identify use of at least two healthy coping skills that she used to assist with regulating mood (achieved, ongoing as of 12/15/23). Long Term Goal 3 (in the client's own words): Allen Whitmore will recognize triggers to anxiety.     Stage of Change: Action    Target Date for completion: 6/15/24     Anticipated therapeutic modalities: CBT     People identified to complete this goal: Rommel Johnson LCSW      Objective 1: (identify the means of measuring success in meeting the objective): Lane Conroy will utilize at least 10-15 minutes of session time to discuss triggers to anxiety (achieved, ongoing as of 12/15/23). Objective 2: (identify the means of measuring success in meeting the objective): Lane Conroy will implement use of at least 3 different healthy coping skills to reduce anxiety when triggered (achieved, ongoing as of 12/15/23). I am currently under the care of a [facility] psychiatric provider: yes    My [facility] psychiatric provider is: Danika Dobbs am currently taking psychiatric medications: Yes, as prescribed    I feel that I will be ready for discharge from mental health care when I reach the following (measurable goal/objective): No longer able to identify treatment goals and have maintained achieved progress, consistently for at least two months. For children and adults who have a legal guardian:   Has there been any change to custody orders and/or guardianship status? NA. If yes, attach updated documentation. Behavioral Health Treatment Plan [facility]: Diagnosis and Treatment Plan explained to [address] [address] acknowledges an understanding of their diagnosis. Didi Acosta agrees to this treatment plan.     I have been offered a copy of this Treatment Plan. yes

## 2023-12-19 ENCOUNTER — TELEMEDICINE (OUTPATIENT)
Dept: PSYCHIATRY | Facility: CLINIC | Age: 28
End: 2023-12-19

## 2023-12-19 DIAGNOSIS — F41.1 GENERALIZED ANXIETY DISORDER: ICD-10-CM

## 2023-12-19 DIAGNOSIS — F31.9 BIPOLAR I DISORDER, IN FULL REMISSION (HCC): Primary | ICD-10-CM

## 2023-12-19 DIAGNOSIS — F90.0 ATTENTION DEFICIT HYPERACTIVITY DISORDER, INATTENTIVE TYPE: ICD-10-CM

## 2023-12-19 NOTE — PSYCH
This note was not shared with the patient due to reasonable likelihood of causing patient harm     Virtual Regular Visit    Problem List Items Addressed This Visit          Other    Generalized anxiety disorder (Chronic)    Bipolar I disorder, in full remission (HCC) - Primary    Attention deficit hyperactivity disorder, inattentive type     Reason for visit is   Chief Complaint   Patient presents with    Medication Management    Follow-up     Encounter provider Jytohi Lo PA-C    Provider located at [address]  [address]  [facility] [address] [phone]    Recent Visits  No visits were found meeting these conditions.  Showing recent visits within past 7 days and meeting all other requirements  Today's Visits  Date Type Provider Dept   12/19/23 Telemedicine Jyothi Lo PA-C [facility]   Showing today's visits and meeting all other requirements  Future Appointments  No visits were found meeting these conditions.  Showing future appointments within next 150 days and meeting all other requirements       After connecting through [facility], the patient was identified by name and date of birth. Guevara Rocha was informed that this is a telemedicine visit and that the visit is being conducted through the Epic Embedded platform. She agrees to proceed. which may not be secure and therefore, might not be HIPAA-compliant.  My office door was closed. No one else was in the room.  She acknowledged consent and understanding of privacy and security of the video platform. The patient has agreed to participate and understands they can discontinue the visit at any time.    SUBJECTIVE:    Guevara Rocha is a 28 y.o. female with a history of bipolar disorder, ADHD, and anxiety who presents for virtual follow-up today.  She reports that she finished this past semester with 2 A's in her classes.  She  reports that she is eligible to graduate.  She feels that her mood has been well-regulated.  She does recognize there are moments in which she becomes easily irritated but she is able to manage them.  She notes that her anxiety is also well-controlled at this time.  She reports that it is not stopping her from engaging in anything that she would like to.    No symptoms of willard.  No significant depressive symptoms.  No changes in sleep or appetite.  She feels her ADHD is well-controlled with her regimen at this time.    No auditory or visual hallucinations.  No delusions.    No medication side effects.    No suicidal or homicidal thought, plan, or intent.    HPI ROS Appetite Changes and Sleep: normal appetite and normal number of sleep hours    Review Of Systems:     Constitutional Negative   ENT Negative   Cardiovascular Negative   Respiratory Negative   Gastrointestinal Negative   Genitourinary Negative   Musculoskeletal Negative   Integumentary Negative   Neurological Negative   Endocrine Negative   Other Symptoms Negative and None           Substance Abuse History:    Social History     Substance and Sexual Activity   Drug Use Not on file       Family Psychiatric History:     History reviewed. No pertinent family history.    Social History     Socioeconomic History    Marital status: Single     Spouse name: Not on file    Number of children: Not on file    Years of education: Not on file    Highest education level: Not on file   Occupational History    Not on file   Tobacco Use    Smoking status: Never    Smokeless tobacco: Never   Substance and Sexual Activity    Alcohol use: Not on file    Drug use: Not on file    Sexual activity: Not on file   Other Topics Concern    Not on file   Social History Narrative    Not on file     Social Determinants of Health     Financial Resource Strain: Not on file   Food Insecurity: Not on file   Transportation Needs: Not on file   Physical Activity: Not on file   Stress: Not  on file   Social Connections: Not on file   Intimate Partner Violence: Not on file   Housing Stability: Not on file       History reviewed. No pertinent past medical history.    History reviewed. No pertinent surgical history.    Current Outpatient Medications   Medication Sig Dispense Refill    ALPRAZolam (XANAX) 0.25 mg tablet Take 1 tablet (0.25 mg total) by mouth daily 30 tablet 0    amphetamine-dextroamphetamine (ADDERALL, 10MG,) 10 mg tablet Take 1 tablet (10 mg total) by mouth 2 (two) times a day Max Daily Amount: 20 mg 60 tablet 0    Aubra EQ 0.1-20 MG-MCG per tablet       cyclobenzaprine (FLEXERIL) 5 mg tablet TAKE 1 TABLET BY MOUTH DAILY FOR MUSCLE SPASM      EPINEPHrine (EPIPEN) 0.3 mg/0.3 mL SOAJ Inject as directed      lurasidone (LATUDA) 40 mg tablet TAKE 1 TABLET BY MOUTH DAILY AT BEDTIME 30 tablet 2    meloxicam (MOBIC) 15 mg tablet TAKE 1 TABLET BY MOUTH DAILY FOR 10 DAYS      Multiple Vitamin (MULTI-VITAMIN DAILY PO) Take by mouth       No current facility-administered medications for this visit.        Allergies   Allergen Reactions    Cashew Nut Oil - Food Allergy      Allergic to cashews       The following portions of the patient's history were reviewed and updated as appropriate: allergies, current medications, past family history, past medical history, past social history, past surgical history, and problem list.    OBJECTIVE:     Mental Status Examination:    Appearance age appropriate, casually dressed   Behavior pleasant, cooperative   Speech normal volume, normal pitch   Mood  euthymic   Affect  mood congruent   Thought Processes logical   Associations intact associations   Thought Content normal   Perceptual Disturbances: none   Abnormal Thoughts  Risk Potential Suicidal ideation - None  Homicidal ideation - None  Potential for aggression - No   Orientation oriented to person, place, time/date and situation   Memory recent and remote memory grossly intact   Cosciousness alert and awake    Attention Span attention span and concentration are age appropriate   Intellect Appears to be of Average Intelligence   Insight age appropriate    Judgement good    Muscle Strength and  Gait muscle strength and tone were normal   Language no difficulty naming common objects   Fund of Knowledge displays adequate knowledge of current events   Pain none   Pain Scale 0           Laboratory Results: No results found for this or any previous visit.    Assessment/Plan:       Diagnoses and all orders for this visit:    Bipolar I disorder, in full remission (HCC)    Attention deficit hyperactivity disorder, inattentive type    Generalized anxiety disorder          Treatment Recommendations- Risks Benefits        Continue current medications:     Latuda 40 mg daily for mood  Adderall 10 mg BID for ADHD (changed from Adderall XR 15 mg due to shortage)  Xanax 0.25 mg daily as needed for extreme anxiety     She will follow-up in 3 months.  She is aware to call the office if questions or concerns arise sooner.  She is aware of emergent and nonemergent mental health resources.  She will continue with her psychotherapist within this office every other week.   nefits And Possible Side Effects Of Medications: discussed    Controlled Medication Discussion: PDMP reviewed- no red flags  The patient understands the risk of treatment with this class of medication. They are aware of the potential for abuse. Patient agrees not to drive or operate heavy machinery if feeling impaired, to take medication as prescribed, to not drink alcohol when taking this medication, and to not share this medication with others.      Psychotherapy Provided: no    Treatment Plan:    Completed and signed during the session: Not applicable - Treatment Plan to be completed by [facility] therapist    I spent 16 minutes with the patient during this visit.    This note was completed in part utilizing Dragon dictation Software. Grammatical,  translation, syntax errors, random word insertions, spelling mistakes, and incomplete sentences may be an occasional consequence of this system secondary to software limitations with voice recognition, ambient noise, and hardware issues. If you have any questions or concerns about the content, text, or information contained within the body of this dictation, please contact the provider for clarification.     Jyothi Lo PA-C 12/19/23

## 2023-12-29 ENCOUNTER — TELEMEDICINE (OUTPATIENT)
Dept: BEHAVIORAL/MENTAL HEALTH CLINIC | Facility: CLINIC | Age: 28
End: 2023-12-29
Payer: COMMERCIAL

## 2023-12-29 DIAGNOSIS — F31.9 BIPOLAR 1 DISORDER (HCC): Primary | ICD-10-CM

## 2023-12-29 DIAGNOSIS — F41.1 GENERALIZED ANXIETY DISORDER: Chronic | ICD-10-CM

## 2023-12-29 PROCEDURE — 90834 PSYTX W PT 45 MINUTES: CPT | Performed by: SOCIAL WORKER
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
## 2024-01-10 DIAGNOSIS — F90.0 ATTENTION DEFICIT HYPERACTIVITY DISORDER, INATTENTIVE TYPE: ICD-10-CM

## 2024-01-10 RX ORDER — DEXTROAMPHETAMINE SACCHARATE, AMPHETAMINE ASPARTATE, DEXTROAMPHETAMINE SULFATE AND AMPHETAMINE SULFATE 2.5; 2.5; 2.5; 2.5 MG/1; MG/1; MG/1; MG/1
10 TABLET ORAL
Qty: 60 TABLET | Refills: 0 | Status: SHIPPED | OUTPATIENT
Start: 2024-01-10 | End: 2024-02-09

## 2024-01-10 NOTE — TELEPHONE ENCOUNTER
Medication Refill Request     Name of Medication Adderall 10 mg  Dose/Frequency 1bid  Quantity 60  Verified pharmacy   [x]  Verified ordering Provider   [x]  Does patient have enough for the next 3 days? Yes [] No [x]  Does patient have a follow-up appointment scheduled? Yes [x] No []   If so when is appointment: 3/14/2024

## 2024-01-12 ENCOUNTER — TELEMEDICINE (OUTPATIENT)
Dept: BEHAVIORAL/MENTAL HEALTH CLINIC | Facility: CLINIC | Age: 29
End: 2024-01-12
Payer: COMMERCIAL

## 2024-01-12 DIAGNOSIS — F41.1 GENERALIZED ANXIETY DISORDER: Chronic | ICD-10-CM

## 2024-01-12 DIAGNOSIS — F31.9 BIPOLAR 1 DISORDER (HCC): Primary | ICD-10-CM

## 2024-01-12 PROCEDURE — 90834 PSYTX W PT 45 MINUTES: CPT | Performed by: SOCIAL WORKER

## 2024-01-12 NOTE — PSYCH
This note was not shared with the patient due to this is a psychotherapy note    Virtual Regular Visit    Verification of patient location:    Patient is located at Home in the following state in which I hold an active license [place]      Assessment/Plan:    Problem List Items Addressed This Visit          Other    Generalized anxiety disorder (Chronic)    Bipolar 1 disorder (HCC) - Primary       Goals addressed in session: Goal 1, Goal 2, and Goal 3           Reason for visit is   Chief Complaint   Patient presents with    Virtual Regular Visit          Encounter provider Lea Nicholson    Provider located at [facility] [place]  [address]  [address]  [place] [id number] [phone]      Recent Visits  No visits were found meeting these conditions.  Showing recent visits within past 7 days and meeting all other requirements  Today's Visits  Date Type Provider Dept   01/12/24 Telemedicine Lea Nicholson  [facility] [place]   Showing today's visits and meeting all other requirements  Future Appointments  No visits were found meeting these conditions.  Showing future appointments within next 150 days and meeting all other requirements       The patient was identified by name and date of birth. Guevara Rocha was informed that this is a telemedicine visit and that the visit is being conducted throughthe [employer] platform. She agrees to proceed..  My office door was closed. No one else was in the room.  She acknowledged consent and understanding of privacy and security of the video platform. The patient has agreed to participate and understands they can discontinue the visit at any time.    Patient is aware this is a billable service.     Behavioral Health Psychotherapy Progress Note    Psychotherapy Provided: Individual Psychotherapy     1. Bipolar 1 disorder (HCC)        2. Generalized anxiety disorder             Goals addressed in session: Goal 1, Goal 2, and Goal 3      DATA: Writer met with karolina Waterman, for an individual psychotherapy session. Guevara reported that she is not engaged yet, but has realized this is because her boyfriend waited until last minute during the holiday season to find a ring. Guevara shared that over the past two weeks she has noticed that she is having increased intrusive thoughts of her past trauma. Guevara shared that she is quick to utilize distraction and self-talk to move past the thought; however, the thoughts have been slightly more intense than usual. Guevara speculated that this could be related to the passing of the holidays and spending time with her sisters.     Guevara went on to discuss thoughts about potentially going back to school, work and various stressors. Writer actively listened as Guevara shared and spent time exploring and processing thoughts and feelings. Writer offered emotional validation, thought redirection and emphasized the importance of healthy self-talk and coping.   During this session, this clinician used the following therapeutic modalities: Cognitive Behavioral Therapy and Supportive Psychotherapy    Substance Abuse was not addressed during this session. If the client is diagnosed with a co-occurring substance use disorder, please indicate any changes in the frequency or amount of use: NA. Stage of change for addressing substance use diagnoses: No substance use/Not applicable    ASSESSMENT:  Guevara Rocha presents with a Euthymic/ normal mood.     her affect is Normal range and intensity, which is congruent, with her mood and the content of the session. The client has made progress on their goals.    Guevara was oriented X3 and well engaged. Eye contact was good, speech was of normal rate and tone. Thought content was normal; insight and judgement were intact. Guevara Rocha presents with a none risk of suicide, none  "risk of self-harm, and none risk of harm to others.    For any risk assessment that surpasses a \"low\" rating, a safety plan must be developed.    A safety plan was indicated: no  If yes, describe in detail NA    PLAN: Between sessions, Guevara Rocha will be mindful of self-talk when experiencing intrusive thoughts. At the next session, the therapist will use Cognitive Behavioral Therapy to address stress/anxiety/mood.    Behavioral Health Treatment Plan and Discharge Planning: Guevara Rocha is aware of and agrees to continue to work on their treatment plan. They have identified and are working toward their discharge goals. yes    Visit start and stop times:    01/12/24  Start Time: 0900  Stop Time: 0945  Total Visit Time: 45 minutes    "

## 2024-01-26 ENCOUNTER — TELEMEDICINE (OUTPATIENT)
Dept: BEHAVIORAL/MENTAL HEALTH CLINIC | Facility: CLINIC | Age: 29
End: 2024-01-26
Payer: COMMERCIAL

## 2024-01-26 DIAGNOSIS — F31.9 BIPOLAR 1 DISORDER (HCC): Primary | ICD-10-CM

## 2024-01-26 DIAGNOSIS — F41.1 GENERALIZED ANXIETY DISORDER: Chronic | ICD-10-CM

## 2024-01-26 PROCEDURE — 90834 PSYTX W PT 45 MINUTES: CPT | Performed by: SOCIAL WORKER

## 2024-01-26 NOTE — PSYCH
This note was not shared with the patient due to this is a psychotherapy note    Virtual Regular Visit    Verification of patient location:    Patient is located at Home in the following state in which I hold an active license [place]      Assessment/Plan:    Problem List Items Addressed This Visit       Generalized anxiety disorder (Chronic)    Bipolar 1 disorder (HCC) - Primary       Goals addressed in session: Goal 1, Goal 2, and Goal 3           Reason for visit is   Chief Complaint   Patient presents with    Virtual Regular Visit          Encounter provider Lea Nicholson    Provider located at [facility] [place]  [address]  [address]  [facility] [id number] [phone]      Recent Visits  No visits were found meeting these conditions.  Showing recent visits within past 7 days and meeting all other requirements  Today's Visits  Date Type Provider Dept   01/26/24 Telemedicine Lea Nicholson  [facility] [place]   Showing today's visits and meeting all other requirements  Future Appointments  No visits were found meeting these conditions.  Showing future appointments within next 150 days and meeting all other requirements       The patient was identified by name and date of birth. Guevara Rocha was informed that this is a telemedicine visit and that the visit is being conducted through[employer] platform. She agrees to proceed..  My office door was closed. No one else was in the room.  She acknowledged consent and understanding of privacy and security of the video platform. The patient has agreed to participate and understands they can discontinue the visit at any time.    Patient is aware this is a billable service.     Behavioral Health Psychotherapy Progress Note    Psychotherapy Provided: Individual Psychotherapy     1. Bipolar 1 disorder (HCC)        2. Generalized anxiety disorder             Goals addressed in session: Goal 1, Goal 2, and Goal 3      DATA: Writer met with Guevara, virtually, for an individual psychotherapy session. Guevara shared that she has been well overall. Guevara reported that she has had more time for relaxation since school ended and has been enjoying the downtime. Guevara noted a small decrease in motivation which she equates to the overall reduction of her busy schedule. Guevara shared that her mental health has been steady which she has been enjoying as well.     Guevara reported that in speaking to her niece, she recognized that they share similarities with sensory oddities. Guevara provided examples such as not being able to wear certain fabrics/textures, feeling dysregulated if sweating too much and doing things a certain way. Guevara reported that she did some research and found connections to these things and Bipolar and anxiety disorders. Writer spent time exploring this with Guevara and discussing potential underlying causes. Writer and Guevara discussed potential traits of OCD. Guevara reported that she believes she has learned to cope well with these things and they do not impact her functioning.     Guevara shared that she has been vocal with her significant other about the potential of her becoming symptomatic of Bipolar Disorder. Guevara discussed thoughts and feelings related to self-awareness of the symptoms and being able to navigate them, especially as she is moving forward in her life with thoughts of marriage and starting a family. Guevara reflected on her own childhood and what she wants to ensure does not happen with her children. Guevara also discussed her relationships with her sisters, noting feelings of anxiety and stress related to seeing them. Guevara discussed the idea of potentially having a family session to address some of these emotions.     Writer actively listened as Guevara shared throughout session and  "assisted with processing thoughts and feelings. Writer offered emotional validation, utilized thought challenging and provided psycho-education throughout session. Writer encouraged healthy coping and self-care.   During this session, this clinician used the following therapeutic modalities: Cognitive Behavioral Therapy and Supportive Psychotherapy    Substance Abuse was not addressed during this session. If the client is diagnosed with a co-occurring substance use disorder, please indicate any changes in the frequency or amount of use: NA. Stage of change for addressing substance use diagnoses: No substance use/Not applicable    ASSESSMENT:  Guevara Rohca presents with a Euthymic/ normal mood.     her affect is Normal range and intensity, which is congruent, with her mood and the content of the session. The client has made progress on their goals.    Guevara was oriented X3 and well engaged. Eye contact was good, speech was of normal rate and tone. Thought content was normal; insight and judgement were intact. Guevara Rocha presents with a none risk of suicide, none risk of self-harm, and none risk of harm to others.    For any risk assessment that surpasses a \"low\" rating, a safety plan must be developed.    A safety plan was indicated: no  If yes, describe in detail NA    PLAN: Between sessions, Guevara Rocha will be mindful of self-care and healthy life choices. At the next session, the therapist will use Cognitive Behavioral Therapy to address stress/anxiety.    Behavioral Health Treatment Plan and Discharge Planning: Guevara Rocha is aware of and agrees to continue to work on their treatment plan. They have identified and are working toward their discharge goals. yes    Visit start and stop times:    01/26/24  Start Time: 0900  Stop Time: 0945  Total Visit Time: 45 minutes  "

## 2024-02-09 ENCOUNTER — TELEMEDICINE (OUTPATIENT)
Dept: BEHAVIORAL/MENTAL HEALTH CLINIC | Facility: CLINIC | Age: 29
End: 2024-02-09

## 2024-02-09 DIAGNOSIS — F90.0 ATTENTION DEFICIT HYPERACTIVITY DISORDER, INATTENTIVE TYPE: ICD-10-CM

## 2024-02-09 DIAGNOSIS — F41.1 GENERALIZED ANXIETY DISORDER: Chronic | ICD-10-CM

## 2024-02-09 DIAGNOSIS — F31.9 BIPOLAR 1 DISORDER (HCC): Primary | ICD-10-CM

## 2024-02-09 DIAGNOSIS — F41.1 GENERALIZED ANXIETY DISORDER: ICD-10-CM

## 2024-02-09 RX ORDER — ALPRAZOLAM 0.25 MG/1
0.25 TABLET ORAL DAILY
Qty: 30 TABLET | Refills: 0 | Status: SHIPPED | OUTPATIENT
Start: 2024-02-09

## 2024-02-09 RX ORDER — DEXTROAMPHETAMINE SACCHARATE, AMPHETAMINE ASPARTATE, DEXTROAMPHETAMINE SULFATE AND AMPHETAMINE SULFATE 2.5; 2.5; 2.5; 2.5 MG/1; MG/1; MG/1; MG/1
10 TABLET ORAL
Qty: 60 TABLET | Refills: 0 | Status: SHIPPED | OUTPATIENT
Start: 2024-02-09 | End: 2024-03-10

## 2024-02-09 NOTE — TELEPHONE ENCOUNTER
Patient requesting medication refills on:    Xanax 0.25 mg  Adderall 10 mg    Pharmacy verified [facility]    Next Appt Jyothi Lo PA-C 3/14/2025

## 2024-02-09 NOTE — PSYCH
This note was not shared with the patient due to this is a psychotherapy note    Virtual Regular Visit    Verification of patient location:    Patient is located at Home in the following state in which I hold an active license [place]      Assessment/Plan:    Problem List Items Addressed This Visit       Generalized anxiety disorder (Chronic)    Bipolar 1 disorder (HCC) - Primary       Goals addressed in session: Goal 1, Goal 2, and Goal 3           Reason for visit is   Chief Complaint   Patient presents with    Virtual Regular Visit          Encounter provider Lea Nicholson    Provider located at [facility] [place]  [address]  [address]  [facility] [id number] [phone]      Recent Visits  No visits were found meeting these conditions.  Showing recent visits within past 7 days and meeting all other requirements  Today's Visits  Date Type Provider Dept   02/09/24 Telemedicine Lea Nicholson  [facility] [place]   Showing today's visits and meeting all other requirements  Future Appointments  No visits were found meeting these conditions.  Showing future appointments within next 150 days and meeting all other requirements       The patient was identified by name and date of birth. Guevara Rocha was informed that this is a telemedicine visit and that the visit is being conducted through[employer] platform. She agrees to proceed..  My office door was closed. No one else was in the room.  She acknowledged consent and understanding of privacy and security of the video platform. The patient has agreed to participate and understands they can discontinue the visit at any time.    Patient is aware this is a billable service.     Behavioral Health Psychotherapy Progress Note    Psychotherapy Provided: Individual Psychotherapy     1. Bipolar 1 disorder (HCC)        2. Generalized anxiety disorder             Goals addressed in session: Goal 1, Goal 2, and Goal 3      DATA: Writer met with Guevara for an individual psychotherapy session. Guevara shared that she got engaged last week. Guevara discussed the details of her engagement and reported feeling so happy. Guevara discussed the reactions of her family and friends, noting some surprise at a few friends that either did not react or reacted in a lesser way than she anticipated. Guevara spent time reflecting on past relationships and the decisions she made over the years that led her to this place. Guevara discussed her mindset at this time, noting that she is more focused on happiness and stability vs material things. Guevara recognized how she has grown over time in her relationship with her significant other and acknowledged how being in a healthy relationship has helped to balance her. Writer actively listened as Guevara shared and assisted with processing thoughts and feelings. Writer offered emotional validation and encouraged continued effort to make healthy decisions, engage in self-care and utilize healthy coping as needed to reduce stress.   During this session, this clinician used the following therapeutic modalities: Cognitive Behavioral Therapy and Supportive Psychotherapy    Substance Abuse was not addressed during this session. If the client is diagnosed with a co-occurring substance use disorder, please indicate any changes in the frequency or amount of use: NA. Stage of change for addressing substance use diagnoses: No substance use/Not applicable    ASSESSMENT:  Guevara Rocha presents with a Euthymic/ normal mood.     her affect is Normal range and intensity, which is congruent, with her mood and the content of the session. The client has made progress on their goals.    Guevara was oriented X3 and well engaged. Eye contact was good, speech was of normal rate and tone. Thought content was normal; insight and judgement were  "intact. Guevara Rocha presents with a none risk of suicide, none risk of self-harm, and none risk of harm to others.    For any risk assessment that surpasses a \"low\" rating, a safety plan must be developed.    A safety plan was indicated: no  If yes, describe in detail NA    PLAN: Between sessions, Guevara Rocha will focus on healthy decision making and self-care. At the next session, the therapist will use Cognitive Behavioral Therapy to address stress/anxiety/mood.    Behavioral Health Treatment Plan and Discharge Planning: Guevara Rocha is aware of and agrees to continue to work on their treatment plan. They have identified and are working toward their discharge goals. yes    Visit start and stop times:    02/09/24  Start Time: 0900  Stop Time: 0944  Total Visit Time: 44 minutes    "

## 2024-02-23 ENCOUNTER — TELEMEDICINE (OUTPATIENT)
Dept: BEHAVIORAL/MENTAL HEALTH CLINIC | Facility: CLINIC | Age: 29
End: 2024-02-23
Payer: COMMERCIAL

## 2024-02-23 DIAGNOSIS — F41.1 GENERALIZED ANXIETY DISORDER: Chronic | ICD-10-CM

## 2024-02-23 DIAGNOSIS — F31.9 BIPOLAR 1 DISORDER (HCC): Primary | ICD-10-CM

## 2024-02-23 DIAGNOSIS — F90.0 ATTENTION DEFICIT HYPERACTIVITY DISORDER, INATTENTIVE TYPE: ICD-10-CM

## 2024-02-23 PROCEDURE — 90834 PSYTX W PT 45 MINUTES: CPT | Performed by: SOCIAL WORKER

## 2024-02-23 NOTE — PSYCH
This note was not shared with the patient due to this is a psychotherapy note    Virtual Regular Visit    Verification of patient location:    Patient is located at Home in the following state in which I hold an active license [place]      Assessment/Plan:    Problem List Items Addressed This Visit       Generalized anxiety disorder (Chronic)    Bipolar 1 disorder (HCC) - Primary    Attention deficit hyperactivity disorder, inattentive type       Goals addressed in session: Goal 1, Goal 2, and Goal 3           Reason for visit is   Chief Complaint   Patient presents with    Virtual Regular Visit          Encounter provider Lea Nicholson    Provider located at [facility] [place]  [address]  [address]  [facility] [id number] [phone]      Recent Visits  No visits were found meeting these conditions.  Showing recent visits within past 7 days and meeting all other requirements  Today's Visits  Date Type Provider Dept   02/23/24 Telemedicine Lea Nicholson  [facility] [place]   Showing today's visits and meeting all other requirements  Future Appointments  No visits were found meeting these conditions.  Showing future appointments within next 150 days and meeting all other requirements       The patient was identified by name and date of birth. Guevara Rocha was informed that this is a telemedicine visit and that the visit is being conducted throughthe [employer] platform. She agrees to proceed..  My office door was closed. No one else was in the room.  She acknowledged consent and understanding of privacy and security of the video platform. The patient has agreed to participate and understands they can discontinue the visit at any time.    Patient is aware this is a billable service.     Behavioral Health Psychotherapy Progress Note    Psychotherapy Provided: Individual Psychotherapy     1. Bipolar 1  disorder (HCC)        2. Generalized anxiety disorder        3. Attention deficit hyperactivity disorder, inattentive type            Goals addressed in session: Goal 1, Goal 2, and Goal 3      DATA: Writer met with Guevara for an individual psychotherapy session. 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 Guevara spent time detailing what took place, how she responded, feedback she received from coworkers and her own internal dialogue. Guevara reported that she has been having some difficultly with self-blame and doubting her decisions, but is working through this. Guevara discussed thoughts about how long she will be able to stay at this job due to the critical nature.     Guevara shared that outside of work she has been well. Guevara reported that she has been feeling slightly bored and missing [place] more [place] lived [place]. Writer and Guevara discussed allowing herself time to adjust and finding positive outlets in her new area.     Writer actively listened as Guevara shared throughout session and assisted with processing thoughts and feelings. Writer offered emotional validation, utilized thought redirection and reinforced Guevara's strengths to assist with positive self-talk.   During this session, this clinician used the following therapeutic modalities: Cognitive Behavioral Therapy and Supportive Psychotherapy    Substance Abuse was not addressed during this session. If the client is diagnosed with a co-occurring substance use disorder, please indicate any changes in the frequency or amount of use: NA. Stage of change for addressing substance use diagnoses: No substance use/Not applicable    ASSESSMENT:  Guevara HANNON [place] presents with a Euthymic/ normal mood.     her affect is Normal range and intensity, which is congruent, with her mood and the  "content of the session. The client has made progress on their goals.    Guevara was oriented X3 and well engaged. Eye contact was good, speech was of normal rate and tone. Thought content was normal; insight and judgement were intact. Guevara Rocha presents with a none risk of suicide, none risk of self-harm, and none risk of harm to others.    For any risk assessment that surpasses a \"low\" rating, a safety plan must be developed.    A safety plan was indicated: no  If yes, describe in detail NA    PLAN: Between sessions, Guevara Rocha will allow herself time to decompress from stress at work and utilize positive self-talk. At the next session, the therapist will use Cognitive Behavioral Therapy to address stress/anxiety.    Behavioral Health Treatment Plan and Discharge Planning: Guevara Rocha is aware of and agrees to continue to work on their treatment plan. They have identified and are working toward their discharge goals. yes    Visit start and stop times:    02/23/24  Start Time: 0900  Stop Time: 0948  Total Visit Time: 48 minutes  "

## 2024-03-08 ENCOUNTER — TELEPHONE (OUTPATIENT)
Dept: PSYCHIATRY | Facility: CLINIC | Age: 29
End: 2024-03-08

## 2024-03-08 NOTE — TELEPHONE ENCOUNTER
Patient is calling regarding cancelling an appointment.    Date/Time: 3/8/2024 9:00 am    Reason: conflict     Patient was rescheduled: YES [] NO [x]  If yes, when was Patient reschedule for:     Patient requesting call back to reschedule: YES [] NO [x]    She really didn't give a reason

## 2024-03-11 DIAGNOSIS — F31.9 BIPOLAR I DISORDER, IN FULL REMISSION (HCC): ICD-10-CM

## 2024-03-11 DIAGNOSIS — F90.0 ATTENTION DEFICIT HYPERACTIVITY DISORDER, INATTENTIVE TYPE: ICD-10-CM

## 2024-03-11 RX ORDER — DEXTROAMPHETAMINE SACCHARATE, AMPHETAMINE ASPARTATE, DEXTROAMPHETAMINE SULFATE AND AMPHETAMINE SULFATE 2.5; 2.5; 2.5; 2.5 MG/1; MG/1; MG/1; MG/1
10 TABLET ORAL
Qty: 60 TABLET | Refills: 0 | Status: SHIPPED | OUTPATIENT
Start: 2024-03-11 | End: 2024-04-10

## 2024-03-11 RX ORDER — LURASIDONE HYDROCHLORIDE 40 MG/1
40 TABLET, FILM COATED ORAL
Qty: 30 TABLET | Refills: 2 | Status: SHIPPED | OUTPATIENT
Start: 2024-03-11

## 2024-03-11 NOTE — TELEPHONE ENCOUNTER
Medication Refill Request     Name of Medication  amphetamine-dextroamphetamine (ADDERALL, 10MG,) 10 mg tablet ()   Dose/Frequency  Take 1 tablet (10 mg total) by mouth 2 (two) times a day Max Daily Amount: 20 mg   Quantity 60  Verified pharmacy   [x]  Verified ordering Provider   [x]  Does patient have enough for the next 3 days? Yes [] No [x]  Does patient have a follow-up appointment scheduled? Yes [x] No []   If so when is appointment: 24 @ 11:30 am

## 2024-03-11 NOTE — TELEPHONE ENCOUNTER
Medication Refill Request     Name of Medication Latuda 40 mg  Dose/Frequency 1qd hs  Quantity 30  Verified pharmacy   [x]  Verified ordering Provider   [x]  Does patient have enough for the next 3 days? Yes [] No [x]  Does patient have a follow-up appointment scheduled? Yes [x] No []   If so when is appointment: 3/14/2024

## 2024-03-14 ENCOUNTER — TELEMEDICINE (OUTPATIENT)
Dept: PSYCHIATRY | Facility: CLINIC | Age: 29
End: 2024-03-14
Payer: COMMERCIAL

## 2024-03-14 DIAGNOSIS — F90.0 ATTENTION DEFICIT HYPERACTIVITY DISORDER, INATTENTIVE TYPE: ICD-10-CM

## 2024-03-14 DIAGNOSIS — F31.9 BIPOLAR I DISORDER, IN FULL REMISSION (HCC): Primary | ICD-10-CM

## 2024-03-14 DIAGNOSIS — F41.1 GENERALIZED ANXIETY DISORDER: ICD-10-CM

## 2024-03-14 PROCEDURE — 99214 OFFICE O/P EST MOD 30 MIN: CPT | Performed by: PHYSICIAN ASSISTANT

## 2024-03-14 NOTE — PSYCH
This note was not shared with the patient due to reasonable likelihood of causing patient harm     Virtual Regular Visit    Problem List Items Addressed This Visit       Generalized anxiety disorder (Chronic)    Attention deficit hyperactivity disorder, inattentive type     Other Visit Diagnoses       Bipolar I disorder, in full remission (HCC)    -  Primary          Reason for visit is   Chief Complaint   Patient presents with    Follow-up    Medication Management     Encounter provider Jyothi Lo PA-C    Provider located at [address]  [address]  [facility] [address] [phone]    Recent Visits  Date Type Provider Dept   03/08/24 Telephone Lea Nicholson [facility]   Showing recent visits within past 7 days and meeting all other requirements  Today's Visits  Date Type Provider Dept   03/14/24 Telemedicine Jyothi Lo PA-C [facility]   Showing today's visits and meeting all other requirements  Future Appointments  No visits were found meeting these conditions.  Showing future appointments within next 150 days and meeting all other requirements       After connecting through [employer] [place], the patient was identified by name and date of birth. Guevara Rocha was informed that this is a telemedicine visit and that the visit is being conducted through the Epic Embedded platform. She agrees to proceed. which may not be secure and therefore, might not be HIPAA-compliant.  My office door was closed. No one else was in the room.  She acknowledged consent and understanding of privacy and security of the video platform. The patient has agreed to participate and understands they can discontinue the visit at any time.    SUBJECTIVE:    Guevara Rocha is a 28 y.o. female with a history of bipolar disorder, ADHD, and anxiety who presents for virtual follow-up today.  She  reports that she has been doing well.  She has been working a lot because she is no longer in school.  She does find that she has more time for things that she wants to do.  She also has more time to focus on physical activity and nutrition.  She feels that has been very helpful for her mood.  She was recently found to be vitamin D deficient and is now supplementing.  She does notice that she has been more tired than usual.  She is hoping that the supplementation will help.    Eventually she would like to shift back to taking extended release Adderall.  She was switched to immediate release due to the shortage.  She does often forget to take the second dose and she will often be too busy to take the second dose if she is at work.  Therefore it just does not work as well for her.  We discussed a short-term trial of returning to her previous XR dosing of 15 mg and then increasing if need be.    No symptoms of willard.  No significant depressive symptoms.  No changes in sleep or appetite.      No auditory or visual hallucinations.  No delusions.    No medication side effects.    No suicidal or homicidal thought, plan, or intent.    HPI ROS Appetite Changes and Sleep: normal appetite and normal number of sleep hours    Review Of Systems:     Constitutional Negative   ENT Negative   Cardiovascular Negative   Respiratory Negative   Gastrointestinal Negative   Genitourinary Negative   Musculoskeletal Negative   Integumentary Negative   Neurological Negative   Endocrine Negative   Other Symptoms Negative and None           Substance Abuse History:    Social History     Substance and Sexual Activity   Drug Use Not on file       Family Psychiatric History:     History reviewed. No pertinent family history.    Social History     Socioeconomic History    Marital status: Single     Spouse name: Not on file    Number of children: Not on file    Years of education: Not on file    Highest education level: Not on file   Occupational  History    Not on file   Tobacco Use    Smoking status: Never    Smokeless tobacco: Never   Substance and Sexual Activity    Alcohol use: Not on file    Drug use: Not on file    Sexual activity: Not on file   Other Topics Concern    Not on file   Social History Narrative    Not on file     Social Determinants of Health     Financial Resource Strain: Not on file   Food Insecurity: Not on file   Transportation Needs: Not on file   Physical Activity: Not on file   Stress: Not on file   Social Connections: Not on file   Intimate Partner Violence: Not on file   Housing Stability: Not on file       History reviewed. No pertinent past medical history.    History reviewed. No pertinent surgical history.    Current Outpatient Medications   Medication Sig Dispense Refill    ALPRAZolam (XANAX) 0.25 mg tablet Take 1 tablet (0.25 mg total) by mouth daily 30 tablet 0    amphetamine-dextroamphetamine (ADDERALL, 10MG,) 10 mg tablet Take 1 tablet (10 mg total) by mouth 2 (two) times a day Max Daily Amount: 20 mg 60 tablet 0    Aubra EQ 0.1-20 MG-MCG per tablet       cyclobenzaprine (FLEXERIL) 5 mg tablet TAKE 1 TABLET BY MOUTH DAILY FOR MUSCLE SPASM      EPINEPHrine (EPIPEN) 0.3 mg/0.3 mL SOAJ Inject as directed      lurasidone (LATUDA) 40 mg tablet TAKE 1 TABLET BY MOUTH DAILY AT BEDTIME 30 tablet 2    meloxicam (MOBIC) 15 mg tablet TAKE 1 TABLET BY MOUTH DAILY FOR 10 DAYS      Multiple Vitamin (MULTI-VITAMIN DAILY PO) Take by mouth       No current facility-administered medications for this visit.        Allergies   Allergen Reactions    Cashew Nut Oil - Food Allergy      Allergic to cashews       The following portions of the patient's history were reviewed and updated as appropriate: allergies, current medications, past family history, past medical history, past social history, past surgical history, and problem list.    OBJECTIVE:     Mental Status Examination:    Appearance age appropriate, casually dressed   Behavior  pleasant, cooperative   Speech normal volume, normal pitch   Mood  euthymic   Affect  mood congruent   Thought Processes logical   Associations intact associations   Thought Content normal   Perceptual Disturbances: none   Abnormal Thoughts  Risk Potential Suicidal ideation - None  Homicidal ideation - None  Potential for aggression - No   Orientation oriented to person, place, time/date and situation   Memory recent and remote memory grossly intact   Cosciousness alert and awake   Attention Span attention span and concentration are age appropriate   Intellect Appears to be of Average Intelligence   Insight age appropriate    Judgement good    Muscle Strength and  Gait muscle strength and tone were normal   Language no difficulty naming common objects   Fund of Knowledge displays adequate knowledge of current events   Pain none   Pain Scale 0           Laboratory Results: No results found for this or any previous visit.    Assessment/Plan:       Diagnoses and all orders for this visit:    Bipolar I disorder, in full remission (HCC)    Attention deficit hyperactivity disorder, inattentive type    Generalized anxiety disorder          Treatment Recommendations- Risks Benefits        Continue current medications:     Latuda 40 mg daily for mood  Adderall 10 mg BID for ADHD (changed from Adderall XR 15 mg due to shortage)  Xanax 0.25 mg daily as needed for extreme anxiety     She will follow-up in 3 months.  She is aware to call the office if questions or concerns arise sooner.  She is aware of emergent and nonemergent mental health resources.  She will continue with her psychotherapist within this office every other week.   nefits And Possible Side Effects Of Medications: discussed    Controlled Medication Discussion: PDMP reviewed- no red flags  The patient understands the risk of treatment with this class of medication. They are aware of the potential for abuse. Patient agrees not to drive or operate heavy machinery  if feeling impaired, to take medication as prescribed, to not drink alcohol when taking this medication, and to not share this medication with others.      Psychotherapy Provided: no    Treatment Plan:    Completed and signed during the session: Not applicable - Treatment Plan to be completed by [facility] therapist    I spent 16 minutes with the patient during this visit.    This note was completed in part utilizing Dragon dictation Software. Grammatical, translation, syntax errors, random word insertions, spelling mistakes, and incomplete sentences may be an occasional consequence of this system secondary to software limitations with voice recognition, ambient noise, and hardware issues. If you have any questions or concerns about the content, text, or information contained within the body of this dictation, please contact the provider for clarification.     Jyothi Lo PA-C 03/14/24

## 2024-04-05 ENCOUNTER — TELEMEDICINE (OUTPATIENT)
Dept: BEHAVIORAL/MENTAL HEALTH CLINIC | Facility: CLINIC | Age: 29
End: 2024-04-05
Payer: COMMERCIAL

## 2024-04-05 DIAGNOSIS — F41.1 GENERALIZED ANXIETY DISORDER: Chronic | ICD-10-CM

## 2024-04-05 DIAGNOSIS — F31.9 BIPOLAR 1 DISORDER (HCC): Primary | ICD-10-CM

## 2024-04-05 PROCEDURE — 90834 PSYTX W PT 45 MINUTES: CPT | Performed by: SOCIAL WORKER
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
## 2024-04-10 DIAGNOSIS — F41.1 GENERALIZED ANXIETY DISORDER: ICD-10-CM

## 2024-04-10 DIAGNOSIS — F90.0 ATTENTION DEFICIT HYPERACTIVITY DISORDER, INATTENTIVE TYPE: ICD-10-CM

## 2024-04-10 RX ORDER — ALPRAZOLAM 0.25 MG/1
0.25 TABLET ORAL DAILY
Qty: 30 TABLET | Refills: 0 | Status: SHIPPED | OUTPATIENT
Start: 2024-04-10

## 2024-04-10 RX ORDER — DEXTROAMPHETAMINE SACCHARATE, AMPHETAMINE ASPARTATE, DEXTROAMPHETAMINE SULFATE AND AMPHETAMINE SULFATE 2.5; 2.5; 2.5; 2.5 MG/1; MG/1; MG/1; MG/1
10 TABLET ORAL
Qty: 60 TABLET | Refills: 0 | Status: SHIPPED | OUTPATIENT
Start: 2024-04-10 | End: 2024-05-10

## 2024-04-10 NOTE — TELEPHONE ENCOUNTER
Patient calling requesting refills on medication:    Adderall 10 mg  Xanax 0.25 mg    Next visit 6/13/2024 Jyothi Lo PA-C    Pharmacy verified [facility]

## 2024-05-03 ENCOUNTER — TELEMEDICINE (OUTPATIENT)
Dept: BEHAVIORAL/MENTAL HEALTH CLINIC | Facility: CLINIC | Age: 29
End: 2024-05-03

## 2024-05-03 DIAGNOSIS — F90.0 ATTENTION DEFICIT HYPERACTIVITY DISORDER, INATTENTIVE TYPE: ICD-10-CM

## 2024-05-03 DIAGNOSIS — F31.9 BIPOLAR 1 DISORDER (HCC): Primary | ICD-10-CM

## 2024-05-03 DIAGNOSIS — F41.1 GENERALIZED ANXIETY DISORDER: Chronic | ICD-10-CM

## 2024-05-03 NOTE — PSYCH
This note was not shared with the patient due to this is a psychotherapy note    Virtual Regular Visit    Verification of patient location:    Patient is located at Home in the following state in which I hold an active license [place]      Assessment/Plan:    Problem List Items Addressed This Visit       Generalized anxiety disorder (Chronic)    Bipolar 1 disorder (HCC) - Primary    Attention deficit hyperactivity disorder, inattentive type       Goals addressed in session: Goal 1, Goal 2, and Goal 3           Reason for visit is   Chief Complaint   Patient presents with    Virtual Regular Visit          Encounter provider Lea Nicholson      Recent Visits  No visits were found meeting these conditions.  Showing recent visits within past 7 days and meeting all other requirements  Today's Visits  Date Type Provider Dept   05/03/24 Telemedicine [facility]olson [facility] [place]   Showing today's visits and meeting all other requirements  Future Appointments  No visits were found meeting these conditions.  Showing future appointments within next 150 days and meeting all other requirements       The patient was identified by name and date of birth. Guevara Rocha was informed that this is a telemedicine visit and that the visit is being conducted throughthe Epic Embedded platform. She agrees to proceed..  My office door was closed. No one else was in the room.  She acknowledged consent and understanding of privacy and security of the video platform. The patient has agreed to participate and understands they can discontinue the visit at any time.    Patient is aware this is a billable service.     Behavioral Health Psychotherapy Progress Note    Psychotherapy Provided: Individual Psychotherapy     1. Bipolar 1 disorder (HCC)        2. Generalized anxiety disorder        3. Attention deficit hyperactivity disorder, inattentive type            Goals addressed in session: Goal 1, Goal 2, and Goal  3      DATA: Writer met with Guevara for an individual psychotherapy session. Guevara shared that she has been feeling increased stress recently as she has been planning her wedding. Guevara shared that they picked a wedding date in October of this year, which means that they  have a short time to get a lot done. Guevara shared that she has been asking her fiance for more help as she has noticed the increased stress. Guevara spent time discussing her thoughts and feelings related to wedding planning, navigating many decisions and expectations. Guevara went on to discuss some stress related to family and friends and their involvement in the wedding. Guevara expressed some frustration with some of the decisions others have been making. Writer actively listened as Guevara shared and assisted with processing thoughts and feelings. Writer offered emotional validation, discussed boundaries and utilized thought challenging to address unhelpful thinking patterns.   During this session, this clinician used the following therapeutic modalities: Cognitive Behavioral Therapy and Supportive Psychotherapy    Substance Abuse was not addressed during this session. If the client is diagnosed with a co-occurring substance use disorder, please indicate any changes in the frequency or amount of use: NA. Stage of change for addressing substance use diagnoses: No substance use/Not applicable    ASSESSMENT:  Guevara Rocha presents with a Euthymic/ normal mood.     her affect is Normal range and intensity, which is congruent, with her mood and the content of the session. The client has made progress on their goals.    Guevara was oriented X3 and well engaged. Eye contact was good, speech was of normal rate and tone. Thought content was normal; insight and judgement were intact. Guevara Rocha presents with a none risk of suicide, none risk of self-harm, and none risk of harm to others.    For any risk assessment  "that surpasses a \"low\" rating, a safety plan must be developed.    A safety plan was indicated: no  If yes, describe in detail NA    PLAN: Between sessions, Guevara Rocha will utilize self-talk and boundaries to reduce stress and ask for help if needed. At the next session, the therapist will use Cognitive Behavioral Therapy and Supportive Psychotherapy to address stress/mood/anxiety.    Behavioral Health Treatment Plan and Discharge Planning: Guevara Rocha is aware of and agrees to continue to work on their treatment plan. They have identified and are working toward their discharge goals. yes    Visit start and stop times:    05/03/24  Start Time: 0900  Stop Time: 0953  Total Visit Time: 53 minutes    "

## 2024-05-10 DIAGNOSIS — F90.0 ATTENTION DEFICIT HYPERACTIVITY DISORDER, INATTENTIVE TYPE: ICD-10-CM

## 2024-05-10 RX ORDER — DEXTROAMPHETAMINE SACCHARATE, AMPHETAMINE ASPARTATE, DEXTROAMPHETAMINE SULFATE AND AMPHETAMINE SULFATE 2.5; 2.5; 2.5; 2.5 MG/1; MG/1; MG/1; MG/1
10 TABLET ORAL
Qty: 60 TABLET | Refills: 0 | Status: SHIPPED | OUTPATIENT
Start: 2024-05-10 | End: 2024-06-09

## 2024-05-13 ENCOUNTER — TELEPHONE (OUTPATIENT)
Dept: PSYCHIATRY | Facility: CLINIC | Age: 29
End: 2024-05-13

## 2024-05-13 NOTE — TELEPHONE ENCOUNTER
Guevara called looking for a an appt. She is taking FLMA from work and needs to talk to you.    You really dont have any appts. Please advise    I dont think she is doing well

## 2024-05-14 ENCOUNTER — TELEMEDICINE (OUTPATIENT)
Dept: PSYCHIATRY | Facility: CLINIC | Age: 29
End: 2024-05-14
Payer: COMMERCIAL

## 2024-05-14 DIAGNOSIS — F41.1 GENERALIZED ANXIETY DISORDER: ICD-10-CM

## 2024-05-14 DIAGNOSIS — F90.0 ATTENTION DEFICIT HYPERACTIVITY DISORDER, INATTENTIVE TYPE: ICD-10-CM

## 2024-05-14 DIAGNOSIS — F31.9 BIPOLAR I DISORDER, IN FULL REMISSION (HCC): Primary | ICD-10-CM

## 2024-05-14 PROCEDURE — 99214 OFFICE O/P EST MOD 30 MIN: CPT | Performed by: PHYSICIAN ASSISTANT

## 2024-05-14 NOTE — PSYCH
This note was not shared with the patient due to reasonable likelihood of causing patient harm     Virtual Regular Visit    Problem List Items Addressed This Visit       Generalized anxiety disorder (Chronic)    Attention deficit hyperactivity disorder, inattentive type     Other Visit Diagnoses       Bipolar I disorder, in full remission (HCC)    -  Primary          Reason for visit is   Chief Complaint   Patient presents with    Follow-up    Medication Management     Encounter provider Jyothi Lo PA-C    Provider located at [address]  [address]  [facility] [address] [phone]    Recent Visits  Date Type Provider Dept   05/13/24 Telephone Jyohti Lo PA-C [facility]   Showing recent visits within past 7 days and meeting all other requirements  Today's Visits  Date Type Provider Dept   05/14/24 Telemedicine Jyothi Lo PA-C [facility]   Showing today's visits and meeting all other requirements  Future Appointments  No visits were found meeting these conditions.  Showing future appointments within next 150 days and meeting all other requirements       After connecting through PhoneAndPhone, the patient was identified by name and date of birth. Guevara Rocha was informed that this is a telemedicine visit and that the visit is being conducted through the Epic Embedded platform. She agrees to proceed. which may not be secure and therefore, might not be HIPAA-compliant.  My office door was closed. No one else was in the room.  She acknowledged consent and understanding of privacy and security of the video platform. The patient has agreed to participate and understands they can discontinue the visit at any time.    SUBJECTIVE:    Guevara Rocha is a 29 y.o. female with a history of bipolar disorder, ADHD, and anxiety who presents for virtual follow-up today.   "She reports \"I am having a hard time.\"  She shares that she has been under a lot of stress and feeling overwhelmed over the last several weeks.  She is getting  in October and recently had a falling out with a very close friend.  She states \"we got to a really big fight and now this close friend of several years is not talking to me anymore.\"  She feels that her stress has been exacerbated by being very busy at work.  She shares that she would like to look into [facility] due to the exacerbation of her anxiety.  She has been taking her medication consistently.  She denies any major mood changes such as symptoms of willard or severe depression.  She does report \"I am feeling more on edge.\"  At this time she would like to remain on her current medications.  She has been consistently following up with her psychotherapist.      She is having a little bit more difficulty falling asleep but she is getting adequate amount of hours.  She also reports a lower appetite.      No auditory or visual hallucinations.  No delusions.    No medication side effects.    No suicidal or homicidal thought, plan, or intent.    HPI ROS Appetite Changes and Sleep: Slightly decreased appetite, trouble falling asleep    Review Of Systems:     Constitutional Negative   ENT Negative   Cardiovascular Negative   Respiratory Negative   Gastrointestinal Negative   Genitourinary Negative   Musculoskeletal Negative   Integumentary Negative   Neurological Negative   Endocrine Negative   Other Symptoms Negative and None           Substance Abuse History:    Social History     Substance and Sexual Activity   Drug Use Not on file       Family Psychiatric History:     History reviewed. No pertinent family history.    Social History     Socioeconomic History    Marital status: Single     Spouse name: Not on file    Number of children: Not on file    Years of education: Not on file    Highest education level: Not on file   Occupational History    Not on file "   Tobacco Use    Smoking status: Never    Smokeless tobacco: Never   Substance and Sexual Activity    Alcohol use: Not on file    Drug use: Not on file    Sexual activity: Not on file   Other Topics Concern    Not on file   Social History Narrative    Not on file     Social Determinants of Health     Financial Resource Strain: Not on file   Food Insecurity: Not on file   Transportation Needs: Not on file   Physical Activity: Not on file   Stress: Not on file   Social Connections: Not on file   Intimate Partner Violence: Not on file   Housing Stability: Not on file       History reviewed. No pertinent past medical history.    History reviewed. No pertinent surgical history.    Current Outpatient Medications   Medication Sig Dispense Refill    ALPRAZolam (XANAX) 0.25 mg tablet Take 1 tablet (0.25 mg total) by mouth daily 30 tablet 0    amphetamine-dextroamphetamine (ADDERALL, 10MG,) 10 mg tablet Take 1 tablet (10 mg total) by mouth 2 (two) times a day Max Daily Amount: 20 mg 60 tablet 0    Aubra EQ 0.1-20 MG-MCG per tablet       cyclobenzaprine (FLEXERIL) 5 mg tablet TAKE 1 TABLET BY MOUTH DAILY FOR MUSCLE SPASM      EPINEPHrine (EPIPEN) 0.3 mg/0.3 mL SOAJ Inject as directed      lurasidone (LATUDA) 40 mg tablet TAKE 1 TABLET BY MOUTH DAILY AT BEDTIME 30 tablet 2    meloxicam (MOBIC) 15 mg tablet TAKE 1 TABLET BY MOUTH DAILY FOR 10 DAYS      Multiple Vitamin (MULTI-VITAMIN DAILY PO) Take by mouth       No current facility-administered medications for this visit.        Allergies   Allergen Reactions    Cashew Nut Oil - Food Allergy      Allergic to cashews       The following portions of the patient's history were reviewed and updated as appropriate: allergies, current medications, past family history, past medical history, past social history, past surgical history, and problem list.    OBJECTIVE:     Mental Status Examination:    Appearance age appropriate, casually dressed   Behavior pleasant, cooperative   Speech  normal volume, normal pitch   Mood Anxious   Affect  mood congruent   Thought Processes logical   Associations intact associations   Thought Content normal   Perceptual Disturbances: none   Abnormal Thoughts  Risk Potential Suicidal ideation - None  Homicidal ideation - None  Potential for aggression - No   Orientation oriented to person, place, time/date and situation   Memory recent and remote memory grossly intact   Cosciousness alert and awake   Attention Span attention span and concentration are age appropriate   Intellect Appears to be of Average Intelligence   Insight age appropriate    Judgement good    Muscle Strength and  Gait muscle strength and tone were normal   Language no difficulty naming common objects   Fund of Knowledge displays adequate knowledge of current events   Pain none   Pain Scale 0           Laboratory Results: No results found for this or any previous visit.    Assessment/Plan:       Diagnoses and all orders for this visit:    Bipolar I disorder, in full remission (HCC)    Attention deficit hyperactivity disorder, inattentive type    Generalized anxiety disorder          Treatment Recommendations- Risks Benefits        Continue current medications:     Latuda 40 mg daily for mood  Adderall 10 mg BID for ADHD (changed from Adderall XR 15 mg due to shortage)  Xanax 0.25 mg daily as needed for extreme anxiety     She will follow-up in 1-2 months.  She is aware to call the office if questions or concerns arise sooner.  She is aware of emergent and nonemergent mental health resources.  She will continue with her psychotherapist within this office every other week.   nefits And Possible Side Effects Of Medications: discussed    Controlled Medication Discussion: PDMP reviewed- no red flags  The patient understands the risk of treatment with this class of medication. They are aware of the potential for abuse. Patient agrees not to drive or operate heavy machinery if feeling impaired, to take  medication as prescribed, to not drink alcohol when taking this medication, and to not share this medication with others.      Psychotherapy Provided: no    Treatment Plan:    Completed and signed during the session: Not applicable - Treatment Plan to be completed by [facility] therapist    I spent 17 minutes with the patient during this visit.    This note was completed in part utilizing Dragon dictation Software. Grammatical, translation, syntax errors, random word insertions, spelling mistakes, and incomplete sentences may be an occasional consequence of this system secondary to software limitations with voice recognition, ambient noise, and hardware issues. If you have any questions or concerns about the content, text, or information contained within the body of this dictation, please contact the provider for clarification.     Jyothi Lo PA-C 05/14/24

## 2024-05-15 ENCOUNTER — TELEPHONE (OUTPATIENT)
Dept: PSYCHIATRY | Facility: CLINIC | Age: 29
End: 2024-05-15

## 2024-05-15 NOTE — TELEPHONE ENCOUNTER
Letter is completed and signed by Jyothi Lo PA-C. Scanned in the media.  Patient is aware that she will sign a MONA at the appointment with Lea Nicholson and call us after visit is completed.  Then we email letter to patient at the email on file in computer demographics.   Patient is aware that we are still in need of any [facility] paperwork and copy of MONA.  Original letter is in front office desk bin for emailing Friday.

## 2024-05-15 NOTE — TELEPHONE ENCOUNTER
Received call from patient stating that she needed a note from Jyothi Lo PA-C completed for her to be able to go on intermittent leave for her employer.   Patient states this was discussed at her last appointment.  Patient is planning on calling out for Friday, May 17, 2024.  Patient is aware of needing an MONA and states letter doesn't need a diagnosis on it.  Patient would like this letter by Friday and to be emailed to her.  Please advise for further Instructions.

## 2024-05-15 NOTE — TELEPHONE ENCOUNTER
----- Message from Jyothi Lo PA-C sent at 5/14/2024  4:41 PM EDT -----  Regarding: RE: MONA Tate has agreed to get the MONA witnessed for us. Can you touch base with Guevara and get the MONA to her before Friday? Thank you!  ----- Message -----  From: Lea Nicholson  Sent: 5/14/2024   3:49 PM EDT  To: Jyothi Lo PA-C  Subject: RE: MONA                                          Absolutely! No problem!!    ----- Message -----  From: Jyothi Lo PA-C  Sent: 5/14/2024   3:15 PM EDT  To: Rita Pacheco; Lea Nicholson; Radha Souza  Subject: RE: MONA                                          José Miguel Tate,     Would you be able to witness an MONA signature for Guevara during your session on Friday? She is requesting FMLA from me and I don't want her to have to drive here to sign.    Sorry for the inconvenience !    Thank you,  Jyothi   ----- Message -----  From: Radha Souza  Sent: 5/14/2024   2:15 PM EDT  To: Jyothi Lo PA-C  Subject: RE: MONA                                          None of the cameras up here works very well for virtuals so I'm not sure how we are going to get it done for her virtually.  ----- Message -----  From: Jyothi Lo PA-C  Sent: 5/14/2024   1:54 PM EDT  To: Rita Pacheco; Radha Souza  Subject: MONA Waterman is sending over paperwork for FMLA- she lives 1.5 hours away so if we can get the MONA signed via video that would be better for her. I really don't want her to have to travel that far to fill it out.       Thank you!    Jyothi

## 2024-05-15 NOTE — TELEPHONE ENCOUNTER
Received FMLA Paperwork from [employer]  for completion by Jyothi Lo PA-C.  Awaiting [facility].

## 2024-05-15 NOTE — TELEPHONE ENCOUNTER
Email patient MONA  on 5/14 to be witness by Lea Nicholson during patient appt on 5/17 for FMLA paperwork, to be filled out by Jyothi Lo.

## 2024-05-17 ENCOUNTER — TELEMEDICINE (OUTPATIENT)
Dept: BEHAVIORAL/MENTAL HEALTH CLINIC | Facility: CLINIC | Age: 29
End: 2024-05-17
Payer: COMMERCIAL

## 2024-05-17 DIAGNOSIS — F41.1 GENERALIZED ANXIETY DISORDER: Chronic | ICD-10-CM

## 2024-05-17 DIAGNOSIS — F90.0 ATTENTION DEFICIT HYPERACTIVITY DISORDER, INATTENTIVE TYPE: ICD-10-CM

## 2024-05-17 DIAGNOSIS — F31.9 BIPOLAR 1 DISORDER (HCC): Primary | ICD-10-CM

## 2024-05-17 PROCEDURE — 90834 PSYTX W PT 45 MINUTES: CPT | Performed by: SOCIAL WORKER

## 2024-05-17 NOTE — PSYCH
This note was not shared with the patient due to this is a psychotherapy note    Virtual Regular Visit    Verification of patient location:    Patient is located at Home in the following state in which I hold an active license [place]      Assessment/Plan:    Problem List Items Addressed This Visit       Generalized anxiety disorder (Chronic)    Bipolar 1 disorder (HCC) - Primary    Attention deficit hyperactivity disorder, inattentive type       Goals addressed in session: Goal 1, Goal 2, and Goal 3           Reason for visit is   Chief Complaint   Patient presents with    Virtual Regular Visit          Encounter provider Lea Nicholson      Recent Visits  No visits were found meeting these conditions.  Showing recent visits within past 7 days and meeting all other requirements  Today's Visits  Date Type Provider Dept   05/17/24 Telemedicine Lea Nicholson [facility] Therapist [facility]   Showing today's visits and meeting all other requirements  Future Appointments  No visits were found meeting these conditions.  Showing future appointments within next 150 days and meeting all other requirements       The patient was identified by name and date of birth. Guevara Rocha was informed that this is a telemedicine visit and that the visit is being conducted throughthe Epic Embedded platform. She agrees to proceed..  My office door was closed. No one else was in the room.  She acknowledged consent and understanding of privacy and security of the video platform. The patient has agreed to participate and understands they can discontinue the visit at any time.    Patient is aware this is a billable service.     Behavioral Health Psychotherapy Progress Note    Psychotherapy Provided: Individual Psychotherapy     1. Bipolar 1 disorder (HCC)        2. Generalized anxiety disorder        3. Attention deficit hyperactivity disorder, inattentive type            Goals addressed in session: Goal 1, Goal 2, and Goal  3      DATA: Writer met with karolina Waterman, for an individual psychotherapy session. Guevara reported that the past few weeks have been stressful. Guevara shared that she was experiencing heightened emotions and stress to the point that she thought she was going to have to be hospitalized. Guevara reported that she realized this was the result of stress from her best friend and maid of honor. Guevara shared how this person was calling her multiple times a day with negative information, causing Guevara to think that other members of her bridal party did not care about her and trying to change things about Guevara's wedding/bachelorette party. Guevara shared that this all came to a head when a mutual friend facilitated a conversation and it was realized how unhealthy this person was being toward Guevara. Guevara reported that the person ended up cutting off their friendship as a result which was upsetting for Guevara, but also came with significant relief. Guevara reported in the days following the cut off she was able to recognize that the escalation of her symptoms was directly related to the stress and not her having a manic episode. Guevara shared that she has since been able to be productive and relax. Guevara reported that she still plans to follow through with applying for FMLA in the event that she begins to struggle. Writer witnessed Guevara sign a release of information related to the FMLA. Writer actively listened as Guevara shared throughout session and assisted with processing thoughts and feelings. Writer offered emotional validation and utilized thought redirection at times. Writer reinforced Guevara's healthy decision making and use of boundaries. Writer encouraged healthy coping and self-care to help keep stress low.     During this session, this clinician used the following therapeutic modalities: Cognitive Behavioral Therapy and Supportive  "Psychotherapy    Substance Abuse was not addressed during this session. If the client is diagnosed with a co-occurring substance use disorder, please indicate any changes in the frequency or amount of use: NA. Stage of change for addressing substance use diagnoses: No substance use/Not applicable    ASSESSMENT:  Guevara Rocha presents with a Euthymic/ normal mood.     her affect is Normal range and intensity, which is congruent, with her mood and the content of the session. The client has made progress on their goals.    Guevara was oriented X3 and well engaged. Eye contact was good, speech was of normal rate and tone. Thought content was normal; insight and judgement were intact. Guevara Rocha presents with a none risk of suicide, none risk of self-harm, and none risk of harm to others.    For any risk assessment that surpasses a \"low\" rating, a safety plan must be developed.    A safety plan was indicated: no  If yes, describe in detail NA    PLAN: Between sessions, Guevara Rocha will focus on self-care and healthy coping to reduce stress. At the next session, the therapist will use Cognitive Behavioral Therapy to address stress/anxiety/mood.    Behavioral Health Treatment Plan and Discharge Planning: Guevara Rocha is aware of and agrees to continue to work on their treatment plan. They have identified and are working toward their discharge goals. yes    Visit start and stop times:    05/17/24  Start Time: 0900  Stop Time: 0947  Total Visit Time: 47 minutes    "

## 2024-05-23 ENCOUNTER — TELEPHONE (OUTPATIENT)
Dept: PSYCHIATRY | Facility: CLINIC | Age: 29
End: 2024-05-23

## 2024-05-23 NOTE — TELEPHONE ENCOUNTER
Emailed sent today to patient the signed letter for FMLA completed by Jyothi Lo PA-C.    Also, put forms to be completed for [employer] - FMLA/Disability in provider's mailbox for completion by Jyothi Lo PA-C. Signed MONA for [place] scanned in media.

## 2024-05-30 ENCOUNTER — TELEPHONE (OUTPATIENT)
Dept: PSYCHIATRY | Facility: CLINIC | Age: 29
End: 2024-05-30

## 2024-05-31 ENCOUNTER — TELEMEDICINE (OUTPATIENT)
Dept: BEHAVIORAL/MENTAL HEALTH CLINIC | Facility: CLINIC | Age: 29
End: 2024-05-31
Payer: COMMERCIAL

## 2024-05-31 DIAGNOSIS — F41.1 GENERALIZED ANXIETY DISORDER: Chronic | ICD-10-CM

## 2024-05-31 DIAGNOSIS — F31.9 BIPOLAR 1 DISORDER (HCC): Primary | ICD-10-CM

## 2024-05-31 DIAGNOSIS — F90.0 ATTENTION DEFICIT HYPERACTIVITY DISORDER, INATTENTIVE TYPE: ICD-10-CM

## 2024-05-31 PROCEDURE — 90834 PSYTX W PT 45 MINUTES: CPT | Performed by: SOCIAL WORKER

## 2024-05-31 NOTE — PSYCH
This note was not shared with the patient due to this is a psychotherapy note    Virtual Regular Visit    Verification of patient location:    Patient is located at Home in the following state in which I hold an active license [place]      Assessment/Plan:    Problem List Items Addressed This Visit       Generalized anxiety disorder (Chronic)    Bipolar 1 disorder (HCC) - Primary    Attention deficit hyperactivity disorder, inattentive type       Goals addressed in session: Goal 1 and Goal 2          Reason for visit is   Chief Complaint   Patient presents with    Virtual Regular Visit          Encounter provider Lea Nicholson      Recent Visits  No visits were found meeting these conditions.  Showing recent visits within past 7 days and meeting all other requirements  Today's Visits  Date Type Provider Dept   05/31/24 Telemedicine Lea Nicholson [facility] Therapist [facility]   Showing today's visits and meeting all other requirements  Future Appointments  No visits were found meeting these conditions.  Showing future appointments within next 150 days and meeting all other requirements       The patient was identified by name and date of birth. Guevara Rocha was informed that this is a telemedicine visit and that the visit is being conducted throughthe B[employer] platform. She agrees to proceed..  My office door was closed. No one else was in the room.  She acknowledged consent and understanding of privacy and security of the video platform. The patient has agreed to participate and understands they can discontinue the visit at any time.    Patient is aware this is a billable service.     Behavioral Health Psychotherapy Progress Note    Psychotherapy Provided: Individual Psychotherapy     1. Bipolar 1 disorder (HCC)        2. Generalized anxiety disorder        3. Attention deficit hyperactivity disorder, inattentive type            Goals addressed in session: Goal 1 and Goal 2     DATA: Writer  met with Guevara for an individual psychotherapy session. Guevara shared that she was late to session because another appointment ran into it. Guevara shared that she has been going to wellness appointments that have been helpful with her diet and lifestyle. Guevara shared that she finds this has benefited her with making healthy food choices for herself, as well as helping with keeping her self-image in a good place. Guevara went on to share that she helped to save a man's life this week. Guevara explained how the main lost consciousness and she went to help and was able to recognize what was wrong. Guevara shared feeling proud of herself. 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 Guevara reported that mood has been steady overall, some anxiety that she is managing. Guevara reported that she wants her focus to be on taking care of her mental health and being kind to herself.     Writer actively listened as Guevara shared throughout session and assisted with processing thoughts and feelings. Writer offered emotional validation and utilized thought redirection at time in response to anxious thinking patterns.     During this session, this clinician used the following therapeutic modalities: Cognitive Behavioral Therapy and Supportive Psychotherapy    Substance Abuse was not addressed during this session. If the client is diagnosed with a co-occurring substance use disorder, please indicate any changes in the frequency or amount of use: NA. Stage of change for addressing substance use diagnoses: No substance use/Not applicable    ASSESSMENT:  Guevara Rocha presents with a Euthymic/ normal mood.     her affect is Normal range and intensity, which is congruent, with her mood and the content of the session. The client has made progress on their goals.    Guevara was oriented X3 and well engaged. Eye contact was good, speech was of normal rate and  "tone. Thought content was normal; insight and judgement were intact.  uGevara Rocha presents with a none risk of suicide, none risk of self-harm, and none risk of harm to others.    For any risk assessment that surpasses a \"low\" rating, a safety plan must be developed.    A safety plan was indicated: no  If yes, describe in detail NA    PLAN: Between sessions, Guevara Rocha will focus on being kind to herself and having realistic expectations. At the next session, the therapist will use Cognitive Behavioral Therapy to address stress/mood/anxiety.    Behavioral Health Treatment Plan and Discharge Planning: Guevara Rocha is aware of and agrees to continue to work on their treatment plan. They have identified and are working toward their discharge goals. yes    Visit start and stop times:    05/31/24  Start Time: 0908  Stop Time: 0948  Total Visit Time: 40 minutes    "

## 2024-06-11 DIAGNOSIS — F31.9 BIPOLAR I DISORDER, IN FULL REMISSION (HCC): ICD-10-CM

## 2024-06-11 DIAGNOSIS — F90.0 ATTENTION DEFICIT HYPERACTIVITY DISORDER, INATTENTIVE TYPE: ICD-10-CM

## 2024-06-11 NOTE — TELEPHONE ENCOUNTER
Patient calling requesting refills on medication:    Adderall 10 mg  Latuda 40 mg    Pharmacy--[facility] [place]    Next visit 6/13/2024 Jyothi Lo PA-C    Patient has enough for 3 days.

## 2024-06-12 RX ORDER — DEXTROAMPHETAMINE SACCHARATE, AMPHETAMINE ASPARTATE, DEXTROAMPHETAMINE SULFATE AND AMPHETAMINE SULFATE 2.5; 2.5; 2.5; 2.5 MG/1; MG/1; MG/1; MG/1
10 TABLET ORAL
Qty: 60 TABLET | Refills: 0 | Status: SHIPPED | OUTPATIENT
Start: 2024-06-12 | End: 2024-07-12

## 2024-06-12 RX ORDER — LURASIDONE HYDROCHLORIDE 40 MG/1
40 TABLET, FILM COATED ORAL
Qty: 30 TABLET | Refills: 2 | Status: SHIPPED | OUTPATIENT
Start: 2024-06-12

## 2024-06-13 ENCOUNTER — TELEMEDICINE (OUTPATIENT)
Dept: PSYCHIATRY | Facility: CLINIC | Age: 29
End: 2024-06-13
Payer: COMMERCIAL

## 2024-06-13 DIAGNOSIS — F31.9 BIPOLAR I DISORDER, IN FULL REMISSION (HCC): Primary | ICD-10-CM

## 2024-06-13 DIAGNOSIS — F41.1 GENERALIZED ANXIETY DISORDER: ICD-10-CM

## 2024-06-13 DIAGNOSIS — F90.0 ATTENTION DEFICIT HYPERACTIVITY DISORDER, INATTENTIVE TYPE: ICD-10-CM

## 2024-06-13 PROCEDURE — 99214 OFFICE O/P EST MOD 30 MIN: CPT | Performed by: PHYSICIAN ASSISTANT

## 2024-06-13 NOTE — PSYCH
"This note was not shared with the patient due to reasonable likelihood of causing patient harm     Virtual Regular Visit    Problem List Items Addressed This Visit       Generalized anxiety disorder (Chronic)    Attention deficit hyperactivity disorder, inattentive type     Other Visit Diagnoses       Bipolar I disorder, in full remission (HCC)    -  Primary          Reason for visit is   Chief Complaint   Patient presents with    Medication Management    Follow-up     Encounter provider Jyothi Lo PA-C    Provider located at [address]  [address]  [facility] [id number] [phone]    Recent Visits  No visits were found meeting these conditions.  Showing recent visits within past 7 days and meeting all other requirements  Today's Visits  Date Type Provider Dept   06/13/24 Telemedicine Jyothi Lo PA-C [facility]   Showing today's visits and meeting all other requirements  Future Appointments  No visits were found meeting these conditions.  Showing future appointments within next 150 days and meeting all other requirements       After connecting through [employer], the patient was identified by name and date of birth. Guevara Rocha was informed that this is a telemedicine visit and that the visit is being conducted through the Epic Embedded platform. She agrees to proceed. which may not be secure and therefore, might not be HIPAA-compliant.  My office door was closed. No one else was in the room.  She acknowledged consent and understanding of privacy and security of the video platform. The patient has agreed to participate and understands they can discontinue the visit at any time.    SUBJECTIVE:    Guevara Rocha is a 29 y.o. female with a history of bipolar disorder, ADHD, and anxiety who presents for virtual follow-up today. She reports \"I'm feeling good, I'm significantly less " "stressed.\"  She reports that she has had no contact with her friend that she had a conflict with and feels it has been beneficial.     She is sleeping better and reports an adequate appetite.       No auditory or visual hallucinations.  No delusions.    No medication side effects.    No suicidal or homicidal thought, plan, or intent.    HPI ROS Appetite Changes and Sleep: Adequate sleep, fair appetite     Review Of Systems:     Constitutional Negative   ENT Negative   Cardiovascular Negative   Respiratory Negative   Gastrointestinal Negative   Genitourinary Negative   Musculoskeletal Negative   Integumentary Negative   Neurological Negative   Endocrine Negative   Other Symptoms Negative and None           Substance Abuse History:    Social History     Substance and Sexual Activity   Drug Use Not on file       Family Psychiatric History:     History reviewed. No pertinent family history.    Social History     Socioeconomic History    Marital status: Single     Spouse name: Not on file    Number of children: Not on file    Years of education: Not on file    Highest education level: Not on file   Occupational History    Not on file   Tobacco Use    Smoking status: Never    Smokeless tobacco: Never   Substance and Sexual Activity    Alcohol use: Not on file    Drug use: Not on file    Sexual activity: Not on file   Other Topics Concern    Not on file   Social History Narrative    Not on file     Social Determinants of Health     Financial Resource Strain: Not on file   Food Insecurity: Not on file   Transportation Needs: Not on file   Physical Activity: Not on file   Stress: Not on file   Social Connections: Not on file   Intimate Partner Violence: Not on file   Housing Stability: Not on file       History reviewed. No pertinent past medical history.    History reviewed. No pertinent surgical history.    Current Outpatient Medications   Medication Sig Dispense Refill    ALPRAZolam (XANAX) 0.25 mg tablet Take 1 tablet " (0.25 mg total) by mouth daily 30 tablet 0    amphetamine-dextroamphetamine (ADDERALL, 10MG,) 10 mg tablet Take 1 tablet (10 mg total) by mouth 2 (two) times a day Max Daily Amount: 20 mg 60 tablet 0    Aubra EQ 0.1-20 MG-MCG per tablet       cyclobenzaprine (FLEXERIL) 5 mg tablet TAKE 1 TABLET BY MOUTH DAILY FOR MUSCLE SPASM      EPINEPHrine (EPIPEN) 0.3 mg/0.3 mL SOAJ Inject as directed      lurasidone (LATUDA) 40 mg tablet TAKE 1 TABLET BY MOUTH DAILY AT BEDTIME 30 tablet 2    meloxicam (MOBIC) 15 mg tablet TAKE 1 TABLET BY MOUTH DAILY FOR 10 DAYS      Multiple Vitamin (MULTI-VITAMIN DAILY PO) Take by mouth       No current facility-administered medications for this visit.        Allergies   Allergen Reactions    Cashew Nut Oil - Food Allergy      Allergic to cashews       The following portions of the patient's history were reviewed and updated as appropriate: allergies, current medications, past family history, past medical history, past social history, past surgical history, and problem list.    OBJECTIVE:     Mental Status Examination:    Appearance age appropriate, casually dressed   Behavior pleasant, cooperative   Speech normal volume, normal pitch   Mood Neutral    Affect  mood congruent   Thought Processes logical   Associations intact associations   Thought Content normal   Perceptual Disturbances: none   Abnormal Thoughts  Risk Potential Suicidal ideation - None  Homicidal ideation - None  Potential for aggression - No   Orientation oriented to person, place, time/date and situation   Memory recent and remote memory grossly intact   Cosciousness alert and awake   Attention Span attention span and concentration are age appropriate   Intellect Appears to be of Average Intelligence   Insight age appropriate    Judgement good    Muscle Strength and  Gait muscle strength and tone were normal   Language no difficulty naming common objects   Fund of Knowledge displays adequate knowledge of current events    Pain none   Pain Scale 0           Laboratory Results: No results found for this or any previous visit.    Assessment/Plan:       Diagnoses and all orders for this visit:    Bipolar I disorder, in full remission (HCC)    Attention deficit hyperactivity disorder, inattentive type    Generalized anxiety disorder          Treatment Recommendations- Risks Benefits        Continue current medications:     Latuda 40 mg daily for mood  Adderall 10 mg BID for ADHD (changed from Adderall XR 15 mg due to shortage)  Xanax 0.25 mg daily as needed for extreme anxiety     She will follow-up in 3 months.  She is aware to call the office if questions or concerns arise sooner.  She is aware of emergent and nonemergent mental health resources.  She will continue with her psychotherapist within this office every other week.   nefits And Possible Side Effects Of Medications: discussed    Controlled Medication Discussion: PDMP reviewed- no red flags  The patient understands the risk of treatment with this class of medication. They are aware of the potential for abuse. Patient agrees not to drive or operate heavy machinery if feeling impaired, to take medication as prescribed, to not drink alcohol when taking this medication, and to not share this medication with others.      Psychotherapy Provided: no    Treatment Plan:    Completed and signed during the session: Not applicable - Treatment Plan to be completed by [facility] therapist    I spent 16 minutes with the patient during this visit.    This note was completed in part utilizing Dragon dictation Software. Grammatical, translation, syntax errors, random word insertions, spelling mistakes, and incomplete sentences may be an occasional consequence of this system secondary to software limitations with voice recognition, ambient noise, and hardware issues. If you have any questions or concerns about the content, text, or information contained within the body of  this dictation, please contact the provider for clarification.     Jyothi Lo PA-C 06/13/24

## 2024-07-10 DIAGNOSIS — F90.0 ATTENTION DEFICIT HYPERACTIVITY DISORDER, INATTENTIVE TYPE: ICD-10-CM

## 2024-07-10 RX ORDER — DEXTROAMPHETAMINE SACCHARATE, AMPHETAMINE ASPARTATE, DEXTROAMPHETAMINE SULFATE AND AMPHETAMINE SULFATE 2.5; 2.5; 2.5; 2.5 MG/1; MG/1; MG/1; MG/1
10 TABLET ORAL
Qty: 60 TABLET | Refills: 0 | Status: SHIPPED | OUTPATIENT
Start: 2024-07-10 | End: 2024-08-09

## 2024-07-17 ENCOUNTER — TELEMEDICINE (OUTPATIENT)
Dept: PSYCHIATRY | Facility: CLINIC | Age: 29
End: 2024-07-17
Payer: COMMERCIAL

## 2024-07-17 DIAGNOSIS — F31.9 BIPOLAR I DISORDER, IN FULL REMISSION (HCC): Primary | ICD-10-CM

## 2024-07-17 DIAGNOSIS — F90.0 ATTENTION DEFICIT HYPERACTIVITY DISORDER, INATTENTIVE TYPE: ICD-10-CM

## 2024-07-17 DIAGNOSIS — F41.1 GENERALIZED ANXIETY DISORDER: ICD-10-CM

## 2024-07-17 PROCEDURE — 99214 OFFICE O/P EST MOD 30 MIN: CPT | Performed by: PHYSICIAN ASSISTANT
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
## 2024-07-26 ENCOUNTER — TELEMEDICINE (OUTPATIENT)
Dept: BEHAVIORAL/MENTAL HEALTH CLINIC | Facility: CLINIC | Age: 29
End: 2024-07-26
Payer: COMMERCIAL

## 2024-07-26 DIAGNOSIS — F41.1 GENERALIZED ANXIETY DISORDER: Chronic | ICD-10-CM

## 2024-07-26 DIAGNOSIS — F90.0 ATTENTION DEFICIT HYPERACTIVITY DISORDER, INATTENTIVE TYPE: ICD-10-CM

## 2024-07-26 DIAGNOSIS — F31.9 BIPOLAR 1 DISORDER (HCC): Primary | ICD-10-CM

## 2024-07-26 PROCEDURE — 90834 PSYTX W PT 45 MINUTES: CPT | Performed by: SOCIAL WORKER

## 2024-07-26 NOTE — BH TREATMENT PLAN
Outpatient Behavioral Health Psychotherapy Treatment Plan    Guevara Rocha  1995     Date of Initial Psychotherapy Assessment: 4/9/21   Date of Current Treatment Plan: 7/26/24  Treatment Plan Target Date: 10/26/24  Treatment Plan Expiration Date: 1/26/25    Diagnosis:   1. Bipolar I disorder, in full remission (HCC)        2. Generalized anxiety disorder            Area(s) of Need: understanding Bipolar Disorder/regulating mood/managing anxiety    Long Term Goal 1 (in the client's own words): Guevara will increase self-awareness regarding symptoms of Bipolar Disorder    Stage of Change: Action    Target Date for completion: 1/26/25     Anticipated therapeutic modalities: CBT     People identified to complete this goal: Lea Waterman LCSW      Objective 1: (identify the means of measuring success in meeting the objective): Guevara will utilize 10-15 minutes of session time to discuss insights and awareness regarding her experience with Bipolar Disorder (achieved, ongoing as of 7/26/24).       Long Term Goal 2 (in the client's own words): Guevara will utilize healthy coping skills to assist with mood regulation.    Stage of Change: Action    Target Date for completion: 1/26/25     Anticipated therapeutic modalities: CBT     People identified to complete this goal: Lea Waterman LCSW      Objective 1: (identify the means of measuring success in meeting the objective): When prompted during session, Guevara will identify use of at least two healthy coping skills that she used to assist with regulating mood (achieved, ongoing as of 7/26/24).     Long Term Goal 3 (in the client's own words): Guevara will recognize triggers to anxiety.    Stage of Change: Action    Target Date for completion: 1/26/25     Anticipated therapeutic modalities: CBT     People identified to complete this goal: Lea Waterman LCSW      Objective 1: (identify the means of measuring  success in meeting the objective): Guevara will utilize at least 10-15 minutes of session time to discuss triggers to anxiety (achieved, ongoing as of 7/26/24).      Objective 2: (identify the means of measuring success in meeting the objective): Guevara will implement use of at least 3 different healthy coping skills to reduce anxiety when triggered (achieved, ongoing as of 7/26/24).      I am currently under the care of a [facility] psychiatric provider: yes    My [facility] psychiatric provider is: Jyothi Lo    I am currently taking psychiatric medications: Yes, as prescribed    I feel that I will be ready for discharge from mental health care when I reach the following (measurable goal/objective): No longer able to identify treatment goals and have maintained achieved progress, consistently for at least two months.    For children and adults who have a legal guardian:   Has there been any change to custody orders and/or guardianship status? NA. If yes, attach updated documentation.    Behavioral Health Treatment Plan [facility]: Diagnosis and Treatment Plan explained to Guevara Rocha acknowledges an understanding of their diagnosis. Guevara Rocha agrees to this treatment plan.    I have been offered a copy of this Treatment Plan. yes

## 2024-07-26 NOTE — PSYCH
This note was not shared with the patient due to this is a psychotherapy note    Virtual Regular Visit    Verification of patient location:    Patient is located at Home in the following state in which I hold an active license [place]      Assessment/Plan:    Problem List Items Addressed This Visit       Generalized anxiety disorder (Chronic)    Bipolar 1 disorder (HCC) - Primary    Attention deficit hyperactivity disorder, inattentive type       Goals addressed in session: Goal 1, Goal 2, and Goal 3           Reason for visit is   Chief Complaint   Patient presents with    Virtual Regular Visit          Encounter provider Lea Nicholson      Recent Visits  No visits were found meeting these conditions.  Showing recent visits within past 7 days and meeting all other requirements  Today's Visits  Date Type Provider Dept   07/26/24 Telemedicine Lea Nicholson [facility] Therapist [facility]   Showing today's visits and meeting all other requirements  Future Appointments  No visits were found meeting these conditions.  Showing future appointments within next 150 days and meeting all other requirements       The patient was identified by name and date of birth. Guevara Rocha was informed that this is a telemedicine visit and that the visit is being conducted throughthe [employer] platform. She agrees to proceed..  My office door was closed. No one else was in the room.  She acknowledged consent and understanding of privacy and security of the video platform. The patient has agreed to participate and understands they can discontinue the visit at any time.    Patient is aware this is a billable service.       Behavioral Health Psychotherapy Progress Note    Psychotherapy Provided: Individual Psychotherapy     1. Bipolar 1 disorder (HCC)        2. Generalized anxiety disorder        3. Attention deficit hyperactivity disorder, inattentive type            Goals addressed in session: Goal 1, Goal 2, and Goal  "3      DATA: Writer met with Guevara for an individual psychotherapy session. Guevara shared that she has been doing well, busy with wedding planning. Guevara reported that she has been feeling some stress because her wedding dress came in earlier than she expected which led her thoughts to worrying about potential body changes by her wedding date and the dress not working. Guevara expressed a desire to adjust her expectations as she is self-aware about having high expectations which can lead to let down. Guevara provided some other examples. Guevara went on to discuss that she has been feeling very supported by her fiance and family with the help they have been providing toward the wedding. Guevara reported feeling grateful that her fiance is excited about wedding planning and it has been going smoothly. Guevara discussed thoughts about having children after marriage and plans to speak to her medication provider about potential changes that are pregnancy safe. Guevara discussed thoughts and feelings related to setting boundaries and ending some unhealthy friendships over the past year. Writer actively listened as Guevara shared and spent time processing thoughts and feelings. Writer offered emotional validation, utilized thought challenging and encouraged continuing focus on healthy decision making/self-care. Writer also engaged Guevara in reviewing and updating her treatment plan. Guevara agreed to keep current goals and objectives, identifying that she is in more of a \"maintenance\" phase; Writer agreed.     During this session, this clinician used the following therapeutic modalities: Cognitive Behavioral Therapy and Supportive Psychotherapy    Substance Abuse was not addressed during this session. If the client is diagnosed with a co-occurring substance use disorder, please indicate any changes in the frequency or amount of use: NA. Stage of change for addressing substance use diagnoses: " "No substance use/Not applicable    ASSESSMENT:  Guevara Rocha presents with a Euthymic/ normal mood.     her affect is Normal range and intensity, which is congruent, with her mood and the content of the session. The client has made progress on their goals.    Guevara was oriented X3 and well engaged. Eye contact was good, speech was of normal rate and tone. Thought content was normal; insight and judgement were intact. Guevara Rocha presents with a none risk of suicide, none risk of self-harm, and none risk of harm to others.    For any risk assessment that surpasses a \"low\" rating, a safety plan must be developed.    A safety plan was indicated: no  If yes, describe in detail NA    PLAN: Between sessions, Guevara Rocha will be mindful of expectations. At the next session, the therapist will use Cognitive Behavioral Therapy and Supportive Psychotherapy to address stress/mood/anxiety.    Behavioral Health Treatment Plan and Discharge Planning: Guevara Rocha is aware of and agrees to continue to work on their treatment plan. They have identified and are working toward their discharge goals. yes    Visit start and stop times:    07/26/24  Start Time: 0900  Stop Time: 0948  Total Visit Time: 48 minutes  "

## 2024-08-05 ENCOUNTER — TELEPHONE (OUTPATIENT)
Dept: PSYCHIATRY | Facility: CLINIC | Age: 29
End: 2024-08-05

## 2024-08-06 ENCOUNTER — TELEMEDICINE (OUTPATIENT)
Dept: BEHAVIORAL/MENTAL HEALTH CLINIC | Facility: CLINIC | Age: 29
End: 2024-08-06
Payer: COMMERCIAL

## 2024-08-06 DIAGNOSIS — F90.0 ATTENTION DEFICIT HYPERACTIVITY DISORDER, INATTENTIVE TYPE: ICD-10-CM

## 2024-08-06 DIAGNOSIS — F41.1 GENERALIZED ANXIETY DISORDER: Chronic | ICD-10-CM

## 2024-08-06 DIAGNOSIS — F31.9 BIPOLAR 1 DISORDER (HCC): Primary | ICD-10-CM

## 2024-08-06 PROCEDURE — 90832 PSYTX W PT 30 MINUTES: CPT | Performed by: SOCIAL WORKER

## 2024-08-06 NOTE — PSYCH
This note was not shared with the patient due to this is a psychotherapy note    Virtual Regular Visit    Verification of patient location:    Patient is located at Home in the following state in which I hold an active license [place]      Assessment/Plan:    Problem List Items Addressed This Visit       Generalized anxiety disorder (Chronic)    Bipolar 1 disorder (HCC) - Primary    Attention deficit hyperactivity disorder, inattentive type       Goals addressed in session: Goal 1, Goal 2, and Goal 3           Reason for visit is   Chief Complaint   Patient presents with    Virtual Regular Visit          Encounter provider Lea Nicholson      Recent Visits  Date Type Provider Dept   08/05/24 Telephone Lea Nicholson [facility]   Showing recent visits within past 7 days and meeting all other requirements  Today's Visits  Date Type Provider Dept   08/06/24 Telemedicine Lea Nicholson [facility] Therap[facility]   Showing today's visits and meeting all other requirements  Future Appointments  No visits were found meeting these conditions.  Showing future appointments within next 150 days and meeting all other requirements       The patient was identified by name and date of birth. Guevara Rocha was informed that this is a telemedicine visit and that the visit is being conducted throughthe [employer] platform. She agrees to proceed..  My office door was closed. No one else was in the room.  She acknowledged consent and understanding of privacy and security of the video platform. The patient has agreed to participate and understands they can discontinue the visit at any time.    Patient is aware this is a billable service.     Behavioral Health Psychotherapy Progress Note    Psychotherapy Provided: Individual Psychotherapy     1. Bipolar 1 disorder (HCC)        2. Generalized anxiety disorder        3. Attention deficit hyperactivity disorder, inattentive type            Goals  addressed in session: Goal 1, Goal 2, and Goal 3      DATA: Writer met with Guevara for an individual psychotherapy session. Guevara shared that she has been feeling stress. Guevara reported that her bachelorette party is this coming weekend and she is looking forward to it; however, disappointed in some of her bridesmaids as they do not seem to be putting for the effort that she expected. Guevara spent time sharing specifics about various bridesmaids, noting that when she was in their weddings, she put forth as much time and effort as possible to give the bride the best experience. Guevara expressed feelings of hurt and disappointment. Guevara reported feeling as if she wants to tell her  to leave certain bridesmaids out of the wedding because of how they are acting. Guevara shared that she also had a terrible experience with her dress fitting and left feeling unsure. Writer actively listened as Guevara shared and spent time processing thoughts and feelings. Writer offered emotional validation and also utilized thought challenging to help provide prospective and redirection. Writer and Guevara discussed how she can communicate her concerns to her bridesmaids, and even reach out to the store where her dress is being altered to request another appointment. Guevara was receptive to intervention despite her hurt feelings. Writer encouraged Guevara to focus on her happiness.     During this session, this clinician used the following therapeutic modalities: Cognitive Behavioral Therapy and Supportive Psychotherapy    Substance Abuse was not addressed during this session. If the client is diagnosed with a co-occurring substance use disorder, please indicate any changes in the frequency or amount of use: NA. Stage of change for addressing substance use diagnoses: No substance use/Not applicable    ASSESSMENT:  Guevara HANNON Aj presents with a  frustrated  mood.     her affect is Normal  "range and intensity, which is congruent, with her mood and the content of the session. The client has made progress on their goals.    Guevara was oriented X3 and well engaged. Eye contact was good, speech was of normal rate and tone. Thought content was normal; insight and judgement were intact. Guevara Rocha presents with a none risk of suicide, none risk of self-harm, and none risk of harm to others.    For any risk assessment that surpasses a \"low\" rating, a safety plan must be developed.    A safety plan was indicated: no  If yes, describe in detail NA    PLAN: Between sessions, Guevara Rocha will utilize healthy communication and thought challenging. At the next session, the therapist will use Cognitive Behavioral Therapy and Supportive Psychotherapy to address stress/mood/anxiety.    Behavioral Health Treatment Plan and Discharge Planning: Guevara Rocha is aware of and agrees to continue to work on their treatment plan. They have identified and are working toward their discharge goals. yes    Visit start and stop times:    08/06/24  Start Time: 1200  Stop Time: 1235  Total Visit Time: 35 minutes  "

## 2024-08-07 DIAGNOSIS — F41.1 GENERALIZED ANXIETY DISORDER: ICD-10-CM

## 2024-08-07 DIAGNOSIS — F90.0 ATTENTION DEFICIT HYPERACTIVITY DISORDER, INATTENTIVE TYPE: ICD-10-CM

## 2024-08-07 RX ORDER — DEXTROAMPHETAMINE SACCHARATE, AMPHETAMINE ASPARTATE, DEXTROAMPHETAMINE SULFATE AND AMPHETAMINE SULFATE 2.5; 2.5; 2.5; 2.5 MG/1; MG/1; MG/1; MG/1
10 TABLET ORAL
Qty: 60 TABLET | Refills: 0 | Status: SHIPPED | OUTPATIENT
Start: 2024-08-07 | End: 2024-09-06

## 2024-08-07 RX ORDER — ALPRAZOLAM 0.25 MG/1
0.25 TABLET ORAL DAILY
Qty: 30 TABLET | Refills: 0 | Status: SHIPPED | OUTPATIENT
Start: 2024-08-07

## 2024-08-07 NOTE — TELEPHONE ENCOUNTER
Medication Refill Request     Name of Medication Adderall   Dose/Frequency 10 mg   Quantity 60  Verified pharmacy   [x]  Verified ordering Provider   [x]  Does patient have enough for the next 3 days? Yes [] No [x]  Does patient have a follow-up appointment scheduled? Yes [x] No []   If so when is appointment: 8/28/2024 at 9:00 am    Medication Refill Request     Name of Medication Xanax  Dose/Frequency 0.25 mg  Quantity 30  Verified pharmacy   [x]  Verified ordering Provider   [x]  Does patient have enough for the next 3 days? Yes [] No [x]  Does patient have a follow-up appointment scheduled? Yes [x] No []   If so when is appointment: 8/28/2024 at 9:00 am

## 2024-08-12 ENCOUNTER — TELEPHONE (OUTPATIENT)
Dept: PSYCHIATRY | Facility: CLINIC | Age: 29
End: 2024-08-12

## 2024-08-28 ENCOUNTER — TELEMEDICINE (OUTPATIENT)
Dept: BEHAVIORAL/MENTAL HEALTH CLINIC | Facility: CLINIC | Age: 29
End: 2024-08-28
Payer: COMMERCIAL

## 2024-08-28 DIAGNOSIS — F41.1 GENERALIZED ANXIETY DISORDER: Chronic | ICD-10-CM

## 2024-08-28 DIAGNOSIS — F31.9 BIPOLAR 1 DISORDER (HCC): Primary | ICD-10-CM

## 2024-08-28 DIAGNOSIS — F90.0 ATTENTION DEFICIT HYPERACTIVITY DISORDER, INATTENTIVE TYPE: ICD-10-CM

## 2024-08-28 PROCEDURE — 90834 PSYTX W PT 45 MINUTES: CPT | Performed by: SOCIAL WORKER

## 2024-08-28 NOTE — PSYCH
This note was not shared with the patient due to this is a psychotherapy note    Virtual Regular Visit    Verification of patient location:    Patient is located at Home in the following state in which I hold an active license [place]      Assessment/Plan:    Problem List Items Addressed This Visit       Generalized anxiety disorder (Chronic)    Bipolar 1 disorder (HCC) - Primary    Attention deficit hyperactivity disorder, inattentive type       Goals addressed in session: Goal 1, Goal 2, and Goal 3           Reason for visit is   Chief Complaint   Patient presents with    Virtual Regular Visit          Encounter provider Lea Nicholson      Recent Visits  No visits were found meeting these conditions.  Showing recent visits within past 7 days and meeting all other requirements  Today's Visits  Date Type Provider Dept   08/28/24 Telemedicine Lea Nicholson [facility] Therapist [facility]   Showing today's visits and meeting all other requirements  Future Appointments  No visits were found meeting these conditions.  Showing future appointments within next 150 days and meeting all other requirements       The patient was identified by name and date of birth. Guevara Rocha was informed that this is a telemedicine visit and that the visit is being conducted throughthe [employer] platform. She agrees to proceed..  My office door was closed. No one else was in the room.  She acknowledged consent and understanding of privacy and security of the video platform. The patient has agreed to participate and understands they can discontinue the visit at any time.    Patient is aware this is a billable service.     Behavioral Health Psychotherapy Progress Note    Psychotherapy Provided: Individual Psychotherapy     1. Bipolar 1 disorder (HCC)        2. Generalized anxiety disorder        3. Attention deficit hyperactivity disorder, inattentive type            Goals addressed in session: Goal 1, Goal 2, and Goal 3   "    DATA: Writer met with karolina Waterman, for an individual psychotherapy session. Guevara reported that she has been \"ok\" over the past few weeks. Guevara shared that her bachelorette party went well, although not what she envisioned it to be. Guevara spent time discussing details of this event in her life, noting some disappointment, but overall acceptance of how it turned out. Guevara reported that she did communicate to her one friend about how she was feeling regarding her friend's lack of involvement in the bridal party planning, but the concern was minimized by this friend. Guevara reported that she has noticed seeing the true colors of friends throughout this journey. Guevara went on to share that she  \"adopted\" another cat, a stray from her neighborhood that she has been feeding. Guevara expressed feeling good about making the effort to help. uGevara went on to share some drama and stress that came from her fiance's bachelor party as one of his friends had relapsed into drinking alcohol and it became uncomfortable. Writer actively listened as Guevara shared and spent time exploring and processing thoughts and feelings. Writer offered emotional validation and utilized some thought challenging. Writer reinforced Guevara's strengths and healthy behaviors. Writer encouraged self-care and healthy coping. Guevara noted that she has her second wedding dress fitting today and plans to go into the appointment with the intentions of being more assertive which Writer validated.     During this session, this clinician used the following therapeutic modalities: Cognitive Behavioral Therapy and Supportive Psychotherapy    Substance Abuse was not addressed during this session. If the client is diagnosed with a co-occurring substance use disorder, please indicate any changes in the frequency or amount of use: Na. Stage of change for addressing substance use diagnoses: No substance use/Not " "applicable    ASSESSMENT:  Guevara Rocha presents with a Euthymic/ normal mood.     her affect is Normal range and intensity, which is congruent, with her mood and the content of the session. The client has made progress on their goals.    Guevara was oriented X3 and well engaged. Eye contact was good, speech was of normal rate and tone. Thought content was normal; insight and judgement were intact. Guevara Rocha presents with a none risk of suicide, none risk of self-harm, and none risk of harm to others.    For any risk assessment that surpasses a \"low\" rating, a safety plan must be developed.    A safety plan was indicated: no  If yes, describe in detail NA    PLAN: Between sessions, Guevara Rocha will utilize assertive communication; utilize healthy coping and self-care. At the next session, the therapist will use Cognitive Behavioral Therapy and Supportive Psychotherapy to address stress/mood/anxiety.    Behavioral Health Treatment Plan and Discharge Planning: Guevara Rocha is aware of and agrees to continue to work on their treatment plan. They have identified and are working toward their discharge goals. yes    Visit start and stop times:    08/28/24  Start Time: 0900  Stop Time: 0952  Total Visit Time: 52 minutes  "

## 2024-09-04 DIAGNOSIS — F31.9 BIPOLAR I DISORDER, IN FULL REMISSION (HCC): ICD-10-CM

## 2024-09-04 DIAGNOSIS — F90.0 ATTENTION DEFICIT HYPERACTIVITY DISORDER, INATTENTIVE TYPE: ICD-10-CM

## 2024-09-04 RX ORDER — LURASIDONE HYDROCHLORIDE 40 MG/1
40 TABLET, FILM COATED ORAL
Qty: 30 TABLET | Refills: 2 | Status: SHIPPED | OUTPATIENT
Start: 2024-09-04

## 2024-09-04 RX ORDER — DEXTROAMPHETAMINE SACCHARATE, AMPHETAMINE ASPARTATE, DEXTROAMPHETAMINE SULFATE AND AMPHETAMINE SULFATE 2.5; 2.5; 2.5; 2.5 MG/1; MG/1; MG/1; MG/1
10 TABLET ORAL
Qty: 60 TABLET | Refills: 0 | Status: SHIPPED | OUTPATIENT
Start: 2024-09-04 | End: 2024-10-04

## 2024-09-23 ENCOUNTER — TELEMEDICINE (OUTPATIENT)
Dept: PSYCHIATRY | Facility: CLINIC | Age: 29
End: 2024-09-23
Payer: COMMERCIAL

## 2024-09-23 DIAGNOSIS — F90.0 ATTENTION DEFICIT HYPERACTIVITY DISORDER, INATTENTIVE TYPE: ICD-10-CM

## 2024-09-23 DIAGNOSIS — F41.1 GENERALIZED ANXIETY DISORDER: ICD-10-CM

## 2024-09-23 DIAGNOSIS — F31.9 BIPOLAR I DISORDER, IN FULL REMISSION (HCC): Primary | ICD-10-CM

## 2024-09-23 PROCEDURE — 99214 OFFICE O/P EST MOD 30 MIN: CPT | Performed by: PHYSICIAN ASSISTANT

## 2024-09-23 NOTE — PATIENT INSTRUCTIONS
Plan to come off of Adderall:    Decrease current dosing to taking half tab twice per day (5 mg twice per day) for 10 days.  May continue with half tab in the morning for 2 days (5 mg) and then discontinue.

## 2024-09-23 NOTE — PSYCH
This note was not shared with the patient due to reasonable likelihood of causing patient harm     Virtual Regular Visit    Problem List Items Addressed This Visit       Generalized anxiety disorder (Chronic)    Attention deficit hyperactivity disorder, inattentive type     Other Visit Diagnoses       Bipolar I disorder, in full remission (HCC)    -  Primary          Reason for visit is   Chief Complaint   Patient presents with    Medication Management    Follow-up     Encounter provider Jyothi Lo PA-C    Provider located at [address]  [address]  [facility] [address] [phone]    Recent Visits  No visits were found meeting these conditions.  Showing recent visits within past 7 days and meeting all other requirements  Today's Visits  Date Type Provider Dept   09/23/24 Telemedicine Jyothi Lo PA-C [facility]   Showing today's visits and meeting all other requirements  Future Appointments  No visits were found meeting these conditions.  Showing future appointments within next 150 days and meeting all other requirements       After connecting through [employer], the patient was identified by name and date of birth. Guevara Rocha was informed that this is a telemedicine visit and that the visit is being conducted through the Epic Embedded platform. She agrees to proceed. which may not be secure and therefore, might not be HIPAA-compliant.  My office door was closed. No one else was in the room.  She acknowledged consent and understanding of privacy and security of the video platform. The patient has agreed to participate and understands they can discontinue the visit at any time.    SUBJECTIVE:    Guevara Rocha is a 29 y.o. female with a history of bipolar disorder, ADHD, and anxiety who presents for virtual follow-up today.  She reports that things have been going well over the last  "several weeks.  She feels that her mood is stable.  She denies any significant depression or anxiety symptoms.  She denies any symptoms of willard.  She has been doing well at work.  She is very excited for her wedding on October 4th.  She shares that she made this appointment due to wanting to discuss coming off of Adderall right after her wedding due to family-planning.    She reports that she takes the Adderall consistently.  She is really not skipped any doses and she is worried about coming off of it.  There are times when she takes her dose late and she reports that she will be \"more tired.\"    She has been sleeping and and getting adequate nutrition.    No auditory or visual hallucinations.  No delusions.    No medication side effects.    No suicidal or homicidal thought, plan, or intent.    HPI ROS Appetite Changes and Sleep: Adequate sleep, fair appetite     Review Of Systems:     Constitutional Negative   ENT Negative   Cardiovascular Negative   Respiratory Negative   Gastrointestinal Negative   Genitourinary Negative   Musculoskeletal Negative   Integumentary Negative   Neurological Negative   Endocrine Negative   Other Symptoms Negative and None           Substance Abuse History:    Social History     Substance and Sexual Activity   Drug Use Not on file       Family Psychiatric History:     History reviewed. No pertinent family history.    Social History     Socioeconomic History    Marital status: Single     Spouse name: Not on file    Number of children: Not on file    Years of education: Not on file    Highest education level: Not on file   Occupational History    Not on file   Tobacco Use    Smoking status: Never    Smokeless tobacco: Never   Substance and Sexual Activity    Alcohol use: Not on file    Drug use: Not on file    Sexual activity: Not on file   Other Topics Concern    Not on file   Social History Narrative    Not on file     Social Determinants of Health     Financial Resource Strain: Not " on file   Food Insecurity: Not on file   Transportation Needs: Not on file   Physical Activity: Not on file   Stress: Not on file   Social Connections: Not on file   Intimate Partner Violence: Not on file   Housing Stability: Not on file       History reviewed. No pertinent past medical history.    History reviewed. No pertinent surgical history.    Current Outpatient Medications   Medication Sig Dispense Refill    ALPRAZolam (XANAX) 0.25 mg tablet Take 1 tablet (0.25 mg total) by mouth daily 30 tablet 0    amphetamine-dextroamphetamine (ADDERALL, 10MG,) 10 mg tablet Take 1 tablet (10 mg total) by mouth 2 (two) times a day Max Daily Amount: 20 mg 60 tablet 0    Aubra EQ 0.1-20 MG-MCG per tablet       cyclobenzaprine (FLEXERIL) 5 mg tablet TAKE 1 TABLET BY MOUTH DAILY FOR MUSCLE SPASM      EPINEPHrine (EPIPEN) 0.3 mg/0.3 mL SOAJ Inject as directed      lurasidone (LATUDA) 40 mg tablet TAKE 1 TABLET BY MOUTH DAILY AT BEDTIME 30 tablet 2    meloxicam (MOBIC) 15 mg tablet TAKE 1 TABLET BY MOUTH DAILY FOR 10 DAYS      Multiple Vitamin (MULTI-VITAMIN DAILY PO) Take by mouth       No current facility-administered medications for this visit.        Allergies   Allergen Reactions    Cashew Nut Oil - Food Allergy      Allergic to cashews       The following portions of the patient's history were reviewed and updated as appropriate: allergies, current medications, past family history, past medical history, past social history, past surgical history, and problem list.    OBJECTIVE:     Mental Status Examination:    Appearance age appropriate, casually dressed   Behavior pleasant, cooperative   Speech normal volume, normal pitch   Mood Euthymic   Affect  mood congruent   Thought Processes logical   Associations intact associations   Thought Content normal   Perceptual Disturbances: none   Abnormal Thoughts  Risk Potential Suicidal ideation - None  Homicidal ideation - None  Potential for aggression - No   Orientation oriented to  person, place, time/date and situation   Memory recent and remote memory grossly intact   Cosciousness alert and awake   Attention Span attention span and concentration are age appropriate   Intellect Appears to be of Average Intelligence   Insight age appropriate    Judgement good    Muscle Strength and  Gait Not assessed due to virtual visit   Language no difficulty naming common objects   Fund of Knowledge displays adequate knowledge of current events   Pain none   Pain Scale 0           Laboratory Results: No results found for this or any previous visit.    Assessment/Plan:      Assessment & Plan  Bipolar I disorder, in full remission (HCC)  Patient is stable on her current dosing of Latuda 40 mg daily         Generalized anxiety disorder  Patient utilizes Xanax 0.25 mg daily as needed very sparingly.         Attention deficit hyperactivity disorder, inattentive type  Stable on Adderall 10 mg twice daily.              Treatment Recommendations- Risks Benefits      Patient would like to come off of Adderall due to family planning.  Once she returns from her wedding she will decrease current dosing to 5 mg twice a day for 10 days and then stop.  Discussed that she may take 5 mg in the morning for a couple of days and then stop if she has any discontinuation symptoms.    We did discuss that she should review her regimen with her OB/GYN.  She is aware of the need to stop Xanax and she takes sparingly currently.    Continue current medications:     Latuda 40 mg daily for mood  Adderall 10 mg BID for ADHD   Xanax 0.25 mg daily as needed for extreme anxiety     She will follow-up in 2-3 months.  She is aware to call the office if questions or concerns arise sooner.  She is aware of emergent and nonemergent mental health resources.  She will continue with her psychotherapist within this office every other week.  Benefits And Possible Side Effects Of Medications: discussed    Controlled Medication Discussion: PDMP  reviewed- no red flags  The patient understands the risk of treatment with this class of medication. They are aware of the potential for abuse. Patient agrees not to drive or operate heavy machinery if feeling impaired, to take medication as prescribed, to not drink alcohol when taking this medication, and to not share this medication with others.      Psychotherapy Provided: no    Treatment Plan:    Completed and signed during the session: Not applicable - Treatment Plan to be completed by [facility] therapist    I spent 16 minutes with the patient during this visit.    This note was completed in part utilizing Dragon dictation Software. Grammatical, translation, syntax errors, random word insertions, spelling mistakes, and incomplete sentences may be an occasional consequence of this system secondary to software limitations with voice recognition, ambient noise, and hardware issues. If you have any questions or concerns about the content, text, or information contained within the body of this dictation, please contact the provider for clarification.     Jyothi Lo PA-C 09/23/24

## 2024-10-03 DIAGNOSIS — F90.0 ATTENTION DEFICIT HYPERACTIVITY DISORDER, INATTENTIVE TYPE: ICD-10-CM

## 2024-10-03 DIAGNOSIS — F31.9 BIPOLAR I DISORDER, IN FULL REMISSION (HCC): ICD-10-CM

## 2024-10-03 RX ORDER — LURASIDONE HYDROCHLORIDE 40 MG/1
40 TABLET, FILM COATED ORAL
Qty: 30 TABLET | Refills: 2 | Status: SHIPPED | OUTPATIENT
Start: 2024-10-03

## 2024-10-03 RX ORDER — DEXTROAMPHETAMINE SACCHARATE, AMPHETAMINE ASPARTATE, DEXTROAMPHETAMINE SULFATE AND AMPHETAMINE SULFATE 2.5; 2.5; 2.5; 2.5 MG/1; MG/1; MG/1; MG/1
10 TABLET ORAL
Qty: 60 TABLET | Refills: 0 | Status: SHIPPED | OUTPATIENT
Start: 2024-10-03 | End: 2024-11-02

## 2024-10-03 NOTE — TELEPHONE ENCOUNTER
Medication refill request.   Writer informed there are 2 refills on this medication. Per patient, pharmacy stated the Latuda script was cancelled

## 2024-10-09 ENCOUNTER — TELEMEDICINE (OUTPATIENT)
Dept: BEHAVIORAL/MENTAL HEALTH CLINIC | Facility: CLINIC | Age: 29
End: 2024-10-09
Payer: COMMERCIAL

## 2024-10-09 DIAGNOSIS — F41.1 GENERALIZED ANXIETY DISORDER: Chronic | ICD-10-CM

## 2024-10-09 DIAGNOSIS — F31.12 BIPOLAR 1 DISORDER WITH MODERATE MANIA (HCC): Primary | ICD-10-CM

## 2024-10-09 DIAGNOSIS — F90.0 ATTENTION DEFICIT HYPERACTIVITY DISORDER, INATTENTIVE TYPE: ICD-10-CM

## 2024-10-09 PROCEDURE — 90834 PSYTX W PT 45 MINUTES: CPT | Performed by: SOCIAL WORKER

## 2024-10-09 NOTE — PSYCH
This note was not shared with the patient due to this is a psychotherapy note    Virtual Regular Visit    Verification of patient location:    Patient is located at Home in the following state in which I hold an active license [place]      Assessment/Plan:    Problem List Items Addressed This Visit       Generalized anxiety disorder (Chronic)    Bipolar 1 disorder with moderate willard (HCC) - Primary    Attention deficit hyperactivity disorder, inattentive type       Goals addressed in session: Goal 1, Goal 2, and Goal 3           Reason for visit is   Chief Complaint   Patient presents with    Virtual Regular Visit          Encounter provider Lea Nicholson      Recent Visits  No visits were found meeting these conditions.  Showing recent visits within past 7 days and meeting all other requirements  Today's Visits  Date Type Provider Dept   10/09/24 Telemedicine Lea Nicholson  [facility]   Showing today's visits and meeting all other requirements  Future Appointments  No visits were found meeting these conditions.  Showing future appointments within next 150 days and meeting all other requirements       The patient was identified by name and date of birth. Guevara Rocha was informed that this is a telemedicine visit and that the visit is being conducted throughthe [employer] platform. She agrees to proceed..  My office door was closed. No one else was in the room.  She acknowledged consent and understanding of privacy and security of the video platform. The patient has agreed to participate and understands they can discontinue the visit at any time.    Patient is aware this is a billable service.     Behavioral Health Psychotherapy Progress Note    Psychotherapy Provided: Individual Psychotherapy     1. Bipolar 1 disorder with moderate willard (HCC)        2. Generalized anxiety disorder        3. Attention deficit hyperactivity disorder, inattentive type            Goals addressed  in session: Goal 1, Goal 2, and Goal 3      DATA: Writer met with Guevara for an individual psychotherapy session. Guevara shared that she has been well. Guevara reported that she got  last Friday and the wedding went well. Guevara spent time sharing about various stressors that occurred leading up to the wedding, including the loss of another friendship. Guevara reported that she reminded herself that some things are out of her control which was a great help in coping. Guevara discussed some of the anxiety that she experienced leading up to the wedding and how she was able to navigate. Guevara reported that she continues to feel good and is still processing the event. Guevara shared that she has a job interview tomorrow to explore a different opportunity closer to her home. Writer actively listened as Guevara shared and spent time processing thoughts and feelings. Writer reinforced some of the healthy choices and coping methods Guevara utilized during stressful/anxious times. Writer offered emotional validation and utilized some mild thought challenging. Writer encouraged continuous effort toward treatment goals.     During this session, this clinician used the following therapeutic modalities: Cognitive Behavioral Therapy and Supportive Psychotherapy    Substance Abuse was not addressed during this session. If the client is diagnosed with a co-occurring substance use disorder, please indicate any changes in the frequency or amount of use: NA. Stage of change for addressing substance use diagnoses: No substance use/Not applicable    ASSESSMENT:  Guevara Rocha presents with a Euthymic/ normal mood.     her affect is Normal range and intensity, which is congruent, with her mood and the content of the session. The client has made progress on their goals.    Guevara was oriented X3 and well engaged. Eye contact was good, speech was of normal rate and tone. Thought content was normal;  "insight and judgement were intact. Guevara Rocha presents with a none risk of suicide, none risk of self-harm, and none risk of harm to others.    For any risk assessment that surpasses a \"low\" rating, a safety plan must be developed.    A safety plan was indicated: no  If yes, describe in detail NA    PLAN: Between sessions, Guevara Rocha will continue with effort toward treatment goals. At the next session, the therapist will use Cognitive Behavioral Therapy and Supportive Psychotherapy to address stress/mood/anxiety.    Behavioral Health Treatment Plan and Discharge Planning: Guevara Rocha is aware of and agrees to continue to work on their treatment plan. They have identified and are working toward their discharge goals. yes    Visit start and stop times:    10/09/24  Start Time: 0900  Stop Time: 0947  Total Visit Time: 47 minutes  "

## 2024-10-23 ENCOUNTER — TELEMEDICINE (OUTPATIENT)
Dept: BEHAVIORAL/MENTAL HEALTH CLINIC | Facility: CLINIC | Age: 29
End: 2024-10-23

## 2024-10-23 DIAGNOSIS — F31.9 BIPOLAR I DISORDER, IN FULL REMISSION (HCC): Primary | ICD-10-CM

## 2024-10-23 DIAGNOSIS — F41.1 GENERALIZED ANXIETY DISORDER: Chronic | ICD-10-CM

## 2024-10-23 NOTE — PSYCH
"Writer received cancellation notice from client through [employer] link stating reason for cancellation \"sick.\"   "

## 2024-11-06 ENCOUNTER — TELEPHONE (OUTPATIENT)
Dept: PSYCHIATRY | Facility: CLINIC | Age: 29
End: 2024-11-06

## 2024-11-06 ENCOUNTER — TELEMEDICINE (OUTPATIENT)
Dept: BEHAVIORAL/MENTAL HEALTH CLINIC | Facility: CLINIC | Age: 29
End: 2024-11-06
Payer: COMMERCIAL

## 2024-11-06 DIAGNOSIS — F31.9 BIPOLAR I DISORDER, IN FULL REMISSION (HCC): Primary | ICD-10-CM

## 2024-11-06 DIAGNOSIS — F41.1 GENERALIZED ANXIETY DISORDER: Chronic | ICD-10-CM

## 2024-11-06 DIAGNOSIS — F90.0 ATTENTION DEFICIT HYPERACTIVITY DISORDER, INATTENTIVE TYPE: ICD-10-CM

## 2024-11-06 PROCEDURE — 90834 PSYTX W PT 45 MINUTES: CPT | Performed by: SOCIAL WORKER

## 2024-11-06 NOTE — PSYCH
This note was not shared with the patient due to this is a psychotherapy note    Virtual Regular Visit    Verification of patient location:    Patient is located at Home in the following state in which I hold an active license [place]      Assessment/Plan:    Problem List Items Addressed This Visit       Generalized anxiety disorder (Chronic)    Bipolar I disorder, in full remission (HCC) - Primary    Attention deficit hyperactivity disorder, inattentive type       Goals addressed in session: Goal 1, Goal 2, and Goal 3           Reason for visit is   Chief Complaint   Patient presents with    Virtual Regular Visit          Encounter provider Lea Nicholson      Recent Visits  No visits were found meeting these conditions.  Showing recent visits within past 7 days and meeting all other requirements  Today's Visits  Date Type Provider Dept   11/06/24 Telemedicine Lea Nicholson  [facility]   Showing today's visits and meeting all other requirements  Future Appointments  No visits were found meeting these conditions.  Showing future appointments within next 150 days and meeting all other requirements       The patient was identified by name and date of birth. Guevara Rocha was informed that this is a telemedicine visit and that the visit is being conducted throughthe [employer] platform. She agrees to proceed..  My office door was closed. No one else was in the room.  She acknowledged consent and understanding of privacy and security of the video platform. The patient has agreed to participate and understands they can discontinue the visit at any time.    Patient is aware this is a billable service.     Behavioral Health Psychotherapy Progress Note    Psychotherapy Provided: Individual Psychotherapy     1. Bipolar I disorder, in full remission (HCC)        2. Generalized anxiety disorder        3. Attention deficit hyperactivity disorder, inattentive type            Goals addressed  in session: Goal 1, Goal 2, and Goal 3      DATA: Writer met with Guevara for an individual psychotherapy session. Guevara reported that she has been well overall. Guevara shared that she has had some extra time off recently after injuring her knee during her wedding. 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 Guevara exploring potential decisions that she has been considering that have been a source of anxiety. Guevara reported that she is no longer taking Adderall and believes she has adjusted to navigating ADHD symptoms without it. Guevara reported that she does notice some periods of overstimulation and distractibility, but she has been able to manage. Guevara reported that she is being mindful of good sleep, exercise and healthy eating. Guevara shared that overall her mood has been steady and she has no major concerns at this time. Writer actively listened as Guevara shared and assisted with processing thoughts and feelings. Writer offered emotional validation and reinforced healthy behaviors and decision making.     During this session, this clinician used the following therapeutic modalities: Cognitive Behavioral Therapy and Supportive Psychotherapy    Substance Abuse was not addressed during this session. If the client is diagnosed with a co-occurring substance use disorder, please indicate any changes in the frequency or amount of use: NA. Stage of change for addressing substance use diagnoses: No substance use/Not applicable    ASSESSMENT:  Guevara Rocha presents with a Euthymic/ normal mood.     her affect is Normal range and intensity, which is congruent, with her mood and the content of the session. The client has made progress on their goals.    Guevara was oriented X3 and well engaged. Eye contact was good, speech was of normal rate and tone. Thought content was normal; insight and judgement  "were intact. Guevara Rocha presents with a none risk of suicide, none risk of self-harm, and none risk of harm to others.    For any risk assessment that surpasses a \"low\" rating, a safety plan must be developed.    A safety plan was indicated: no  If yes, describe in detail NA    PLAN: Between sessions, Guevara Rocha will continue with effort toward treatment goals. At the next session, the therapist will use Cognitive Behavioral Therapy and Supportive Psychotherapy to address stress/mood/anxiety.    Behavioral Health Treatment Plan and Discharge Planning: Guevara Rocha is aware of and agrees to continue to work on their treatment plan. They have identified and are working toward their discharge goals. yes    Visit start and stop times:    11/06/24  Start Time: 0900  Stop Time: 0943  Total Visit Time: 43 minutes    "

## 2024-11-18 ENCOUNTER — TELEPHONE (OUTPATIENT)
Dept: PSYCHIATRY | Facility: CLINIC | Age: 29
End: 2024-11-18

## 2024-11-18 NOTE — TELEPHONE ENCOUNTER
Called patient but had to leave voicemail  to sign consent forms via [employer]  before patient's appointment with Lea Nicholson LCSW on 11/20/2024 or appointment will need to be in office.

## 2024-11-20 ENCOUNTER — TELEPHONE (OUTPATIENT)
Age: 29
End: 2024-11-20

## 2024-11-20 NOTE — TELEPHONE ENCOUNTER
Patient called office in regards to needing consents signed. Writer informed patient the consents for signed for virtual appt. Patient then stated the link can be sent whenever. Call then got disconnected. Writer was unable to inform patient appt got cancelled by provider.

## 2024-12-04 ENCOUNTER — TELEMEDICINE (OUTPATIENT)
Dept: BEHAVIORAL/MENTAL HEALTH CLINIC | Facility: CLINIC | Age: 29
End: 2024-12-04
Payer: COMMERCIAL

## 2024-12-04 DIAGNOSIS — F41.1 GENERALIZED ANXIETY DISORDER: Chronic | ICD-10-CM

## 2024-12-04 DIAGNOSIS — F90.0 ATTENTION DEFICIT HYPERACTIVITY DISORDER, INATTENTIVE TYPE: ICD-10-CM

## 2024-12-04 DIAGNOSIS — F31.12 BIPOLAR 1 DISORDER WITH MODERATE MANIA (HCC): Primary | ICD-10-CM

## 2024-12-04 PROCEDURE — 90834 PSYTX W PT 45 MINUTES: CPT | Performed by: SOCIAL WORKER

## 2024-12-04 NOTE — PSYCH
This note was not shared with the patient due to this is a psychotherapy note    Virtual Regular Visit    Verification of patient location:    Patient is located at Home in the following state in which I hold an active license [place]      Assessment/Plan:    Problem List Items Addressed This Visit       Generalized anxiety disorder (Chronic)    Bipolar 1 disorder with moderate willard (HCC) - Primary    Attention deficit hyperactivity disorder, inattentive type       Goals addressed in session: Goal 1, Goal 2, and Goal 3           Reason for visit is   Chief Complaint   Patient presents with    Virtual Regular Visit          Encounter provider Lea Nicholson      Recent Visits  No visits were found meeting these conditions.  Showing recent visits within past 7 days and meeting all other requirements  Today's Visits  Date Type Provider Dept   12/04/24 Telemedicine Le[facility]   Showing today's visits and meeting all other requirements  Future Appointments  No visits were found meeting these conditions.  Showing future appointments within next 150 days and meeting all other requirements       The patient was identified by name and date of birth. Guevara Rocha was informed that this is a telemedicine visit and that the visit is being conducted throughthe [employer] platform. She agrees to proceed..  My office door was closed. No one else was in the room.  She acknowledged consent and understanding of privacy and security of the video platform. The patient has agreed to participate and understands they can discontinue the visit at any time.    Patient is aware this is a billable service.     Behavioral Health Psychotherapy Progress Note    Psychotherapy Provided: Individual Psychotherapy     1. Bipolar 1 disorder with moderate willard (HCC)        2. Generalized anxiety disorder        3. Attention deficit hyperactivity disorder, inattentive type            Goals addressed  in session: Goal 1 and Goal 2     DATA: Writer met with Guevara for an individual psychotherapy session. Guevara reported that she has been well and is preparing to leave for her honeymoon this weekend. Guevara spent some time discussing preparation for the honeymoon and her thoughts and feelings about the trip. Guevara went on to share thoughts and feelings about trying to conceive. Guevara reported that she is trying to keep it stress free. Guevara reported that work has been going well with some stress that she is managing. Guevara reported that she has noticed increased confidence in her work which has been positive. Guevara spent time discussing her relationship with her mother, noting that her mother has been experiencing increased anxiety which impacts Guevara when she is around. Guevara discussed her attempts to navigate this but noted the difficulty that she has and uncertainty about how to handle it. Writer actively listened and spent time processing thoughts and feelings. Writer offered emotional validation and interactive feedback. Writer and Joaquínrinoemi discussed how she could effectively communicate her concerns to her mother. Writer encouraged self-care and healthy coping to continue managing stressors.     During this session, this clinician used the following therapeutic modalities: Cognitive Behavioral Therapy and Supportive Psychotherapy    Substance Abuse was not addressed during this session. If the client is diagnosed with a co-occurring substance use disorder, please indicate any changes in the frequency or amount of use: NA. Stage of change for addressing substance use diagnoses: No substance use/Not applicable    ASSESSMENT:  Guevara Rocha presents with a Euthymic/ normal mood.     her affect is Normal range and intensity, which is congruent, with her mood and the content of the session. The client has made progress on their goals.    Guevara was oriented X3 and well  "engaged. Eye contact was good, speech was of normal rate and tone. Thought content was normal; insight and judgement were intact. Guevara Rocha presents with a none risk of suicide, none risk of self-harm, and none risk of harm to others.    For any risk assessment that surpasses a \"low\" rating, a safety plan must be developed.    A safety plan was indicated: no  If yes, describe in detail NA    PLAN: Between sessions, Guevara Rocha will enjoy her honeymoon; utilize self-care and healthy coping to assist with managing stressors. At the next session, the therapist will use Cognitive Behavioral Therapy and Supportive Psychotherapy to address stress/mood/anxiety.    Behavioral Health Treatment Plan and Discharge Planning: Guevara Rocha is aware of and agrees to continue to work on their treatment plan. They have identified and are working toward their discharge goals. yes    Visit start and stop times:    12/04/24  Start Time: 0900  Stop Time: 0948  Total Visit Time: 48 minutes      "

## 2025-01-13 DIAGNOSIS — F31.9 BIPOLAR I DISORDER, IN FULL REMISSION (HCC): ICD-10-CM

## 2025-01-14 DIAGNOSIS — F41.1 GENERALIZED ANXIETY DISORDER: ICD-10-CM

## 2025-01-14 DIAGNOSIS — F31.9 BIPOLAR I DISORDER, IN FULL REMISSION (HCC): ICD-10-CM

## 2025-01-14 RX ORDER — ALPRAZOLAM 0.25 MG/1
0.25 TABLET ORAL DAILY
Qty: 30 TABLET | Refills: 0 | Status: SHIPPED | OUTPATIENT
Start: 2025-01-14

## 2025-01-14 RX ORDER — LURASIDONE HYDROCHLORIDE 40 MG/1
40 TABLET, FILM COATED ORAL
Qty: 30 TABLET | Refills: 2 | Status: SHIPPED | OUTPATIENT
Start: 2025-01-14

## 2025-01-14 NOTE — TELEPHONE ENCOUNTER
Medication Refill Request     Name of Medication ALPRAZolam (XANAX) 0.25 mg tablet, lurasidone (LATUDA) 40 mg tablet   Dose/Frequency   Quantity   Verified pharmacy   [x]  Verified ordering Provider   [x]  Does patient have enough for the next 3 days? Yes [] No [x]  Does patient have a follow-up appointment scheduled? Yes [] No [x]   If so when is appointment:

## 2025-01-15 ENCOUNTER — TELEMEDICINE (OUTPATIENT)
Dept: BEHAVIORAL/MENTAL HEALTH CLINIC | Facility: CLINIC | Age: 30
End: 2025-01-15
Payer: COMMERCIAL

## 2025-01-15 ENCOUNTER — TELEPHONE (OUTPATIENT)
Age: 30
End: 2025-01-15

## 2025-01-15 DIAGNOSIS — F41.1 GENERALIZED ANXIETY DISORDER: Chronic | ICD-10-CM

## 2025-01-15 DIAGNOSIS — F90.0 ATTENTION DEFICIT HYPERACTIVITY DISORDER, INATTENTIVE TYPE: ICD-10-CM

## 2025-01-15 DIAGNOSIS — F31.12 BIPOLAR 1 DISORDER WITH MODERATE MANIA (HCC): Primary | ICD-10-CM

## 2025-01-15 PROCEDURE — 90834 PSYTX W PT 45 MINUTES: CPT | Performed by: SOCIAL WORKER

## 2025-01-15 NOTE — PSYCH
This note was not shared with the patient due to this is a psychotherapy note    Virtual Regular Visit    Verification of patient location:    Patient is located at Home in the following state in which I hold an active license [place]      Assessment/Plan:    Problem List Items Addressed This Visit       Generalized anxiety disorder (Chronic)    Bipolar 1 disorder with moderate willard (HCC) - Primary    Attention deficit hyperactivity disorder, inattentive type       Goals addressed in session: Goal 1, Goal 2, and Goal 3      Depression Follow-up Plan Completed: Not applicable    Reason for visit is   Chief Complaint   Patient presents with    Virtual Regular Visit          Encounter provider Lea Nicholson      Recent Visits  No visits were found meeting these conditions.  Showing recent visits within past 7 days and meeting all other requirements  Today's Visits  Date Type Provider Dept   01/15/25 Telemedicine Le[facility]olson [facility]   Showing today's visits and meeting all other requirements  Future Appointments  No visits were found meeting these conditions.  Showing future appointments within next 150 days and meeting all other requirements       The patient was identified by name and date of birth. Guevara Rocha was informed that this is a telemedicine visit and that the visit is being conducted throughthe [employer] Embedded platform. She agrees to proceed..  My office door was closed. No one else was in the room.  She acknowledged consent and understanding of privacy and security of the video platform. The patient has agreed to participate and understands they can discontinue the visit at any time.    Patient is aware this is a billable service.     Behavioral Health Psychotherapy Progress Note    Psychotherapy Provided: Individual Psychotherapy     1. Bipolar 1 disorder with moderate willard (HCC)        2. Generalized anxiety disorder        3. Attention deficit hyperactivity  "disorder, inattentive type            Goals addressed in session: Goal 1, Goal 2, and Goal 3      DATA: Writer met with Guevara for an individual psychotherapy session. uGevara shared that she has been well. Guevara reported that she enjoyed her honeymoon, and discussed some of the experience. Guevara reported that she has recently been processing feelings related to learning that her maternal grandmother moved into assisted living because she was diagnosed with Alzheimer's Disease. Guevara shared that it has not progressed too much yet, but she did visit her recently and noticed some changes. Guevara reflected on her relationship with her grandmother, identifying some feelings of disappointment that she \"lost time\" with her after her trail. Guevara reported that she does want to stay active in her grandmother's life at this time. Guevara went on to share about a situation involving some of her neighbors and her choice to stay away from any potential drama. Writer acknowledged the positive nature of Guevara's decision. Guevara reported that stress and anxiety have been minimal. Guevara reported mood has been good. Guevara shared that she has been taking some gymnastic and cheerleading classes which help her to stay in shape and keep her busy. Guevara reported that she is considering trying out for the [employer] team - whether she makes it or not, feels the experience will  be good.     Writer actively listened as Guevara shared and assisted with processing thoughts and feelings. Writer offered emotional validation and provided interactive feedback. Writer reinforced healthy decision making and self-care that Guevara shared about during session.     During this session, this clinician used the following therapeutic modalities: Cognitive Behavioral Therapy and Supportive Psychotherapy    Substance Abuse was not addressed during this session. If the client is " "diagnosed with a co-occurring substance use disorder, please indicate any changes in the frequency or amount of use: NA. Stage of change for addressing substance use diagnoses: No substance use/Not applicable    ASSESSMENT:  Guevara Rocha presents with a Euthymic/ normal mood.     her affect is Normal range and intensity, which is congruent, with her mood and the content of the session. The client has made progress on their goals.    Guevara was oriented X3 and engaged. Eye contact was good, speech was of normal rate and tone. Thought content was normal; insight and judgement were intact. Guevara Rocha presents with a none risk of suicide, none risk of self-harm, and none risk of harm to others.    For any risk assessment that surpasses a \"low\" rating, a safety plan must be developed.    A safety plan was indicated: no  If yes, describe in detail NA    PLAN: Between sessions, Guevara Rocha will continue to focus on self-care. At the next session, the therapist will use Cognitive Behavioral Therapy and Supportive Psychotherapy to address stress/mood/anxiety.    Behavioral Health Treatment Plan and Discharge Planning: Guevara Rocha is aware of and agrees to continue to work on their treatment plan. They have identified and are working toward their discharge goals. yes    Depression Follow-up Plan Completed: Not applicable    Visit start and stop times:    01/15/25  Start Time: 0915  Stop Time: 0957  Total Visit Time: 42 minutes      "

## 2025-02-04 ENCOUNTER — TELEPHONE (OUTPATIENT)
Age: 30
End: 2025-02-04

## 2025-02-04 NOTE — TELEPHONE ENCOUNTER
Pt called office to ask if she could update her mychart with her recent name change as she recently got . Writer informed pt that she can update on her mychart but she may be asked to mail copy of marriage cert. Pt verbalized understanding.

## 2025-02-11 ENCOUNTER — TELEPHONE (OUTPATIENT)
Age: 30
End: 2025-02-11

## 2025-02-11 NOTE — TELEPHONE ENCOUNTER
Patient called and reported that she recently got  and she wanted to fax over a copy of her new insurance card with her new last name on it so it can be updated in the system. Writer provided her fax number to office.

## 2025-02-14 ENCOUNTER — TELEPHONE (OUTPATIENT)
Age: 30
End: 2025-02-14

## 2025-02-14 DIAGNOSIS — F41.1 GENERALIZED ANXIETY DISORDER: ICD-10-CM

## 2025-02-14 RX ORDER — ALPRAZOLAM 0.25 MG/1
0.25 TABLET ORAL DAILY
Qty: 30 TABLET | Refills: 0 | Status: SHIPPED | OUTPATIENT
Start: 2025-02-14

## 2025-02-14 NOTE — TELEPHONE ENCOUNTER
Email received from patient's  with photo ID and insurance card. Forms scanned in patient's chart.

## 2025-02-14 NOTE — TELEPHONE ENCOUNTER
Patient called in inquiring about paperwork with new last name.     Writer relay previous writers msg.     Patient shared she re-faxed documents that same day.     Patient needs refill on medication Xanax 0.25 mg, but needs it to be sent over to pharmacy under new last name.    Patient has enough of this medication for the next 3 days.     Patient is requesting a call back once this is completed.     Please assist.

## 2025-02-14 NOTE — TELEPHONE ENCOUNTER
Patient's  called in regard to getting insurance and patient's id over to office.  stated he tried to fax but his fax machine was not working. Writer checked with office, their fax machine was working. Writer provided [facility] intake email for emailing info.

## 2025-02-14 NOTE — TELEPHONE ENCOUNTER
Medication Refill Request     Name of Medication ALPRAZolam (XANAX)   Dose/Frequency 0.25mg  Quantity 30  Verified pharmacy   [x]  Verified ordering Provider   [x]  Does patient have enough for the next 3 days? Yes [x] No []  Does patient have a follow-up appointment scheduled? Yes [] No [x]   If so when is appointment: